# Patient Record
Sex: MALE | Race: WHITE | NOT HISPANIC OR LATINO | Employment: UNEMPLOYED | URBAN - METROPOLITAN AREA
[De-identification: names, ages, dates, MRNs, and addresses within clinical notes are randomized per-mention and may not be internally consistent; named-entity substitution may affect disease eponyms.]

---

## 2023-08-21 ENCOUNTER — DOCUMENTATION (OUTPATIENT)
Dept: PRIMARY CARE | Facility: CLINIC | Age: 53
End: 2023-08-21
Payer: COMMERCIAL

## 2023-09-05 PROBLEM — R13.19 ESOPHAGEAL DYSPHAGIA: Status: ACTIVE | Noted: 2023-09-05

## 2023-09-05 PROBLEM — M75.01 ADHESIVE CAPSULITIS OF RIGHT SHOULDER: Status: ACTIVE | Noted: 2023-09-05

## 2023-09-05 PROBLEM — M54.2 CERVICALGIA: Status: ACTIVE | Noted: 2023-09-05

## 2023-09-05 PROBLEM — F41.0 PANIC ATTACK: Status: ACTIVE | Noted: 2023-09-05

## 2023-09-05 PROBLEM — E78.00 PURE HYPERCHOLESTEROLEMIA: Status: ACTIVE | Noted: 2023-09-05

## 2023-09-05 PROBLEM — G57.93 NEUROPATHY INVOLVING BOTH LOWER EXTREMITIES: Status: ACTIVE | Noted: 2023-09-05

## 2023-09-05 PROBLEM — M47.816 LUMBAR SPONDYLOSIS: Status: ACTIVE | Noted: 2023-09-05

## 2023-09-05 PROBLEM — R91.1 LUNG NODULE: Status: ACTIVE | Noted: 2023-09-05

## 2023-09-05 RX ORDER — PAROXETINE 10 MG/1
10 TABLET, FILM COATED ORAL NIGHTLY
COMMUNITY
Start: 2022-11-03 | End: 2024-02-22 | Stop reason: SDUPTHER

## 2023-09-05 RX ORDER — MELOXICAM 15 MG/1
TABLET ORAL
COMMUNITY
End: 2023-10-27

## 2023-09-05 RX ORDER — OMEPRAZOLE 20 MG/1
TABLET, DELAYED RELEASE ORAL
COMMUNITY
End: 2023-11-06 | Stop reason: ALTCHOICE

## 2023-09-05 RX ORDER — LORAZEPAM 0.5 MG/1
0.5 TABLET ORAL 2 TIMES DAILY PRN
COMMUNITY
Start: 2023-05-14 | End: 2024-02-22 | Stop reason: SDUPTHER

## 2023-09-05 RX ORDER — LANOLIN ALCOHOL/MO/W.PET/CERES
1 CREAM (GRAM) TOPICAL EVERY 24 HOURS
COMMUNITY

## 2023-09-05 RX ORDER — ALBUTEROL SULFATE 90 UG/1
AEROSOL, METERED RESPIRATORY (INHALATION)
COMMUNITY
Start: 2022-05-17

## 2023-09-05 RX ORDER — GABAPENTIN 300 MG/1
600 CAPSULE ORAL 3 TIMES DAILY
COMMUNITY
Start: 2023-04-27 | End: 2023-11-22 | Stop reason: WASHOUT

## 2023-09-16 VITALS
WEIGHT: 200.4 LBS | HEART RATE: 76 BPM | SYSTOLIC BLOOD PRESSURE: 112 MMHG | BODY MASS INDEX: 23.19 KG/M2 | DIASTOLIC BLOOD PRESSURE: 68 MMHG | HEIGHT: 78 IN | OXYGEN SATURATION: 94 % | TEMPERATURE: 97.3 F

## 2023-09-20 PROBLEM — M48.061 DEGENERATIVE LUMBAR SPINAL STENOSIS: Status: ACTIVE | Noted: 2023-09-20

## 2023-09-20 RX ORDER — DICLOFENAC SODIUM 75 MG/1
75 TABLET, DELAYED RELEASE ORAL 2 TIMES DAILY
COMMUNITY
Start: 2023-08-21 | End: 2024-02-16

## 2023-09-20 RX ORDER — DULOXETIN HYDROCHLORIDE 30 MG/1
CAPSULE, DELAYED RELEASE ORAL
COMMUNITY
Start: 2023-09-06 | End: 2023-10-27 | Stop reason: SINTOL

## 2023-09-25 ENCOUNTER — HOSPITAL ENCOUNTER (OUTPATIENT)
Dept: DATA CONVERSION | Facility: HOSPITAL | Age: 53
Discharge: HOME | End: 2023-09-25
Payer: COMMERCIAL

## 2023-09-25 DIAGNOSIS — F17.210 NICOTINE DEPENDENCE, CIGARETTES, UNCOMPLICATED: ICD-10-CM

## 2023-09-25 DIAGNOSIS — M54.16 RADICULOPATHY, LUMBAR REGION: ICD-10-CM

## 2023-10-26 ENCOUNTER — OFFICE VISIT (OUTPATIENT)
Dept: PAIN MEDICINE | Facility: CLINIC | Age: 53
End: 2023-10-26
Payer: COMMERCIAL

## 2023-10-26 VITALS
HEART RATE: 69 BPM | DIASTOLIC BLOOD PRESSURE: 73 MMHG | HEIGHT: 78 IN | BODY MASS INDEX: 23.72 KG/M2 | WEIGHT: 205 LBS | RESPIRATION RATE: 18 BRPM | SYSTOLIC BLOOD PRESSURE: 110 MMHG

## 2023-10-26 DIAGNOSIS — M48.061 DEGENERATIVE LUMBAR SPINAL STENOSIS: Primary | ICD-10-CM

## 2023-10-26 DIAGNOSIS — M47.816 LUMBAR FACET ARTHROPATHY: ICD-10-CM

## 2023-10-26 DIAGNOSIS — M54.16 LUMBAR RADICULOPATHY: ICD-10-CM

## 2023-10-26 PROCEDURE — 99214 OFFICE O/P EST MOD 30 MIN: CPT | Performed by: STUDENT IN AN ORGANIZED HEALTH CARE EDUCATION/TRAINING PROGRAM

## 2023-10-26 ASSESSMENT — PATIENT HEALTH QUESTIONNAIRE - PHQ9
1. LITTLE INTEREST OR PLEASURE IN DOING THINGS: NOT AT ALL
SUM OF ALL RESPONSES TO PHQ9 QUESTIONS 1 AND 2: 0
2. FEELING DOWN, DEPRESSED OR HOPELESS: NOT AT ALL

## 2023-10-26 ASSESSMENT — PAIN - FUNCTIONAL ASSESSMENT: PAIN_FUNCTIONAL_ASSESSMENT: 0-10

## 2023-10-26 ASSESSMENT — PAIN SCALES - GENERAL
PAINLEVEL: 9
PAINLEVEL_OUTOF10: 9

## 2023-10-26 NOTE — PROGRESS NOTES
"[facility] Pain Management  Follow Up Office Visit Note 10/26/2023    Patient Information: Chris Rivas, MRN: [id number], : 1970   Primary Care/Referring Physician: Beka Gomez, , [address] / [facility] [address]     Chief Complaint: Low back and leg pain  Interval History: At his last office visit I started Duloxetine. He also had an L4/5 HAYLEY in the interim     Today he reports some pain relief for a few days but then his pain worsened. He feels that his pain is now worse than normal. He states the \"middle of his back and neck kept going numb\" after the injection. He is feeling a grinding in his low back, and still feels occasional numbness in his thoracic and cervical spine. Currently his worst area of pain is midline between his mid and lower back, with much less pain in his legs.     He tried Duloxetine but it caused diarrhea so he returned to taking Paroxetine    Brief History of Pain: Mr. Crhis Rivas is a 53 y.o. male with a PMHx of HLD, dysphagia, anxiety who presents for evaluation of low back, neck, and shoulder pain. He states his low back is the most signficant area of pain thus will focus on that today    He reports low back pain for 25 years that has been progressively worsening. He states he 'fractured' vertebrae in his low back many years ago but did not require surgery. He describes the pain as a midline stabbing, throbbing, and grinding. His low back pain worsens with walking, twisting. The pain occasionally radiates down his legs, right moreso than the left. His entire right foot is numb, and he occasionally gets numbness further up the leg. His left foot also occasionally gets numb. His Gabapentin dose was increased recently with some improvement in his foot pain but no improvement in his low back pain. He states Diclofenac is working better than Meloxicam but he is still having quite a lot of pain    Regarding his neck and left shoulder, he states he 'broke his neck' in " 2004 after diving into a pool but again did not require surgery. He has a lot of left shoulder pain, and occasionally right shoulder pain, when trying to lift his arms above his head.    Current Pain Medications: Diclofenac 75 mg BID PRN, Gabapentin 600 mg TID  Previously Tried Pain Medications: Meloxicam, PO steroids, Duloxetine - caused diarrhea    Relevant Surgeries: Denies neck or low back surgery  Injections: L4/5 HAYLEY - no pain relief    Physical/Occupational Therapy: No recent PT for his low back    Medications:   Current Outpatient Medications   Medication Instructions    albuterol 108 (90 Base) MCG/ACT inhaler 2 puffs as needed Inhalation every 4 -6 hours prn SOB    albuterol 90 mcg/actuation inhaler 2 puffs as needed Inhalation every 4-6 hrs    cyanocobalamin (Vitamin B-12) 1,000 mcg tablet Every 24 hours    diclofenac (Voltaren) 75 mg EC tablet 1 tablet as needed Orally Twice a day for 90 days    DULoxetine (Cymbalta) 30 mg DR capsule 1 capsule Orally Once a day for 30 day(s)    gabapentin (Neurontin) 300 mg capsule Every 8 hours    LORazepam (Ativan) 0.5 mg tablet take 1/2 to 1 tablet by mouth twice a day as needed for anxiety/panic for 10    meloxicam (Mobic) 15 mg tablet TAKE ONE TABLET BY MOUTH EVERY DAY for 30    omeprazole OTC (PriLOSEC OTC) 20 mg EC tablet oral    PARoxetine (Paxil) 10 mg tablet Every 24 hours      Allergies:   Allergies   Allergen Reactions    Lavender Anaphylaxis    Percocet [Oxycodone-Acetaminophen] Nausea/vomiting       Past Medical & Surgical History:  Past Medical History:   Diagnosis Date    Hyperlipidemia     Lumbar spondylosis     Panic attacks       Past Surgical History:   Procedure Laterality Date    APPENDECTOMY  08/08/2014    Appendectomy    JOINT REPLACEMENT Left     ELBOW    JOINT REPLACEMENT Right     ANKLE    LUNG SURGERY  10/01/2014    Lung Surgery       Family History   Problem Relation Name Age of Onset    Leukemia Mother      Dementia Father       Social  "History     Socioeconomic History    Marital status: Single     Spouse name: Not on file    Number of children: Not on file    Years of education: Not on file    Highest education level: Not on file   Occupational History    Not on file   Tobacco Use    Smoking status: Every Day     Packs/day: 0.50     Years: 3.00     Additional pack years: 0.00     Total pack years: 1.50     Types: Cigarettes     Start date: 1/1/2020    Smokeless tobacco: Not on file   Substance and Sexual Activity    Alcohol use: Yes     Comment: 2-4 TIMES A MONTH    Drug use: Never    Sexual activity: Not on file   Other Topics Concern    Not on file   Social History Narrative    Not on file     Social Determinants of Health     Financial Resource Strain: Not on file   Food Insecurity: Not on file   Transportation Needs: Not on file   Physical Activity: Not on file   Stress: Not on file   Social Connections: Not on file   Intimate Partner Violence: Not on file   Housing Stability: Not on file       Problems, Past medical history, past surgical history, Medications, allergies, social and family history reviewed and as per the electronic medical record from today's encounter    Review of Systems:  CONST: No fever, chills, fatigue, weight changes  EYES: No loss of vision  ENT: No hearing loss, tinnitus  CV: No chest pain, palpitations  RESP: No dyspnea, shortness of breath, cough  GI: No stool incontinence, nausea, vomiting  : No urinary incontinence  MSK: No joint swelling  SKIN: No rash, no hives  NEURO: No headache, dizziness, weakness, paresthesias  PSYCH: No anxiety, depression or suicidal ideation  HEM/LYMPH: No easy bruising or bleeding  All other systems reviewed are negative     Physical Exam:  Vitals: /73   Pulse 69   Resp 18   Ht 2.007 m (6' 7\")   Wt 93 kg (205 lb)   BMI 23.09 kg/m²   General: No apparent distress. Alert, appropriate, oriented x 3. Mood generally positive, affect congruent. Speaking in full sentences.   HENT: " "Normocephalic, atraumatic. Hearing intact.  Eyes: Pupils equal and round  Neck: Supple, trachea midline  Lungs: Symmetric respiratory excursion on visual exam, nonlabored breathing.   Extremities: No cyanosis or edema noted in extremities.  Skin: No rashes, lesions noted.  Neuro: Alert and appropriate. Gait within normal limits. Bulk and tone within normal limits.    Laboratory Data:  The following laboratory data were reviewed during this visit:   Lab Results   Component Value Date    WBC 4.4 (L) 09/22/2022    RBC 4.01 (L) 09/22/2022    HGB 12.8 (L) 09/22/2022    HCT 39.0 (L) 09/22/2022     09/22/2022      No results found for: \"INR\"  Lab Results   Component Value Date    CREATININE 0.8 09/22/2022       Imaging:  The following imaging impressions were reviewed by me during this visit:    -7/20/23 lumbar spine MRI shows L2-L3 left foraminal disc protrusion and left lateral disc protrusion with facet hypertrophy, moderate left-sided neural foraminal narrowing. L4-L5 diffuse disc bulging, facet hypertrophy, moderate to severe canal stenosis with moderate left-sided neural foraminal narrowing. L5-S1 diffuse disc bulging with superimposed left foraminal disc protrusion and facet hypertrophy, severe left-sided neural foraminal narrowing and mild-to-moderate right-sided neural foraminal narrowing  -8/23/23 left shoulder xray shows no acute fracture or glenohumeral dislocation. No acromioclavicular seperation. Mild acromioclavicular degenerative changes.  I also personally reviewed the images from the above studies myself. These images and my interpretation of them contributed to the management and decision making of the patient's medical plan.    ASSESSMENT:  Mr. Chris Rivas is a 53 y.o. male with low back and leg pain that is consistent with:    1. Degenerative lumbar spinal stenosis    2. Lumbar radiculopathy    3. Lumbar facet arthropathy        PLAN:  Radiology: I reviewed his 7/20/23 lumbar spine MRI which " shows L2-L3 left foraminal disc protrusion and left lateral disc protrusion with facet hypertrophy, moderate left-sided neural foraminal narrowing. L4-L5 diffuse disc bulging, facet hypertrophy, moderate to severe canal stenosis with moderate left-sided neural foraminal narrowing. L5-S1 diffuse disc bulging with superimposed left foraminal disc protrusion and facet hypertrophy, severe left-sided neural foraminal narrowing and mild-to-moderate right-sided neural foraminal narrowing    Physically: I believe he would benefit from referral for land and pool based therapy, which was provided today. I believe his current pain level may make it difficult to meaningfully participate in PT thus would like to attempt to control his pain with medication and intervention prior to starting this    Psychologically: I suspect there is some component of anxiety causing pain amplification. Continue following with your PCP and would consider referral to a behavioral health provider in the future    Medication: -Continue Gabapentin 600 mg TID, his dose was recently increased by his PCP with some improvement  -Discontinue Duloxetine, caused diarrhea    Duration: Multiple years    Intervention: I suspect his pain is multifactorial in the setting of lumbar spinal stenosis, lumbar facet arthropathy, lumbar radiculopathy, myofascial pain, and possible left shoulder rotator cuff tendinopathy. His MRI does show fairly advanced central and foraminal stenosis which likely accounts for some of the low back and leg pain he is experiencing. He underwent an HAYLEY at L4/5 with no improvement. Today we discussed trial of bilateral lumbar medial branch nerve blocks at L4/5, L5/S1 utilizing live fluoroscopy. Risk, benefits, alternatives discussed. He would like to proceed  -Given the severity of the degenerative changes noted, as well as evidence of numbness in his lower extremities, he may require decompressive surgery in the future. It seems he would  like to see Dr. Almazan if he decides to move forward with this        Sincerely,  Juan Stark MD  [facility] Pain Management - [place]

## 2023-10-26 NOTE — H&P (VIEW-ONLY)
"[facility] Pain Management  Follow Up Office Visit Note 10/26/2023    Patient Information: Chris Rivas, MRN: [id number], : 1970   Primary Care/Referring Physician: Beka Gomez, , [address] / [facility] [address]     Chief Complaint: Low back and leg pain  Interval History: At his last office visit I started Duloxetine. He also had an L4/5 HAYLEY in the interim     Today he reports some pain relief for a few days but then his pain worsened. He feels that his pain is now worse than normal. He states the \"middle of his back and neck kept going numb\" after the injection. He is feeling a grinding in his low back, and still feels occasional numbness in his thoracic and cervical spine. Currently his worst area of pain is midline between his mid and lower back, with much less pain in his legs.     He tried Duloxetine but it caused diarrhea so he returned to taking Paroxetine    Brief History of Pain: Mr. Chris Rivas is a 53 y.o. male with a PMHx of HLD, dysphagia, anxiety who presents for evaluation of low back, neck, and shoulder pain. He states his low back is the most signficant area of pain thus will focus on that today    He reports low back pain for 25 years that has been progressively worsening. He states he 'fractured' vertebrae in his low back many years ago but did not require surgery. He describes the pain as a midline stabbing, throbbing, and grinding. His low back pain worsens with walking, twisting. The pain occasionally radiates down his legs, right moreso than the left. His entire right foot is numb, and he occasionally gets numbness further up the leg. His left foot also occasionally gets numb. His Gabapentin dose was increased recently with some improvement in his foot pain but no improvement in his low back pain. He states Diclofenac is working better than Meloxicam but he is still having quite a lot of pain    Regarding his neck and left shoulder, he states he 'broke his neck' in " 2004 after diving into a pool but again did not require surgery. He has a lot of left shoulder pain, and occasionally right shoulder pain, when trying to lift his arms above his head.    Current Pain Medications: Diclofenac 75 mg BID PRN, Gabapentin 600 mg TID  Previously Tried Pain Medications: Meloxicam, PO steroids, Duloxetine - caused diarrhea    Relevant Surgeries: Denies neck or low back surgery  Injections: L4/5 HAYLEY - no pain relief    Physical/Occupational Therapy: No recent PT for his low back    Medications:   Current Outpatient Medications   Medication Instructions    albuterol 108 (90 Base) MCG/ACT inhaler 2 puffs as needed Inhalation every 4 -6 hours prn SOB    albuterol 90 mcg/actuation inhaler 2 puffs as needed Inhalation every 4-6 hrs    cyanocobalamin (Vitamin B-12) 1,000 mcg tablet Every 24 hours    diclofenac (Voltaren) 75 mg EC tablet 1 tablet as needed Orally Twice a day for 90 days    DULoxetine (Cymbalta) 30 mg DR capsule 1 capsule Orally Once a day for 30 day(s)    gabapentin (Neurontin) 300 mg capsule Every 8 hours    LORazepam (Ativan) 0.5 mg tablet take 1/2 to 1 tablet by mouth twice a day as needed for anxiety/panic for 10    meloxicam (Mobic) 15 mg tablet TAKE ONE TABLET BY MOUTH EVERY DAY for 30    omeprazole OTC (PriLOSEC OTC) 20 mg EC tablet oral    PARoxetine (Paxil) 10 mg tablet Every 24 hours      Allergies:   Allergies   Allergen Reactions    Lavender Anaphylaxis    Percocet [Oxycodone-Acetaminophen] Nausea/vomiting       Past Medical & Surgical History:  Past Medical History:   Diagnosis Date    Hyperlipidemia     Lumbar spondylosis     Panic attacks       Past Surgical History:   Procedure Laterality Date    APPENDECTOMY  08/08/2014    Appendectomy    JOINT REPLACEMENT Left     ELBOW    JOINT REPLACEMENT Right     ANKLE    LUNG SURGERY  10/01/2014    Lung Surgery       Family History   Problem Relation Name Age of Onset    Leukemia Mother      Dementia Father       Social  "History     Socioeconomic History    Marital status: Single     Spouse name: Not on file    Number of children: Not on file    Years of education: Not on file    Highest education level: Not on file   Occupational History    Not on file   Tobacco Use    Smoking status: Every Day     Packs/day: 0.50     Years: 3.00     Additional pack years: 0.00     Total pack years: 1.50     Types: Cigarettes     Start date: 1/1/2020    Smokeless tobacco: Not on file   Substance and Sexual Activity    Alcohol use: Yes     Comment: 2-4 TIMES A MONTH    Drug use: Never    Sexual activity: Not on file   Other Topics Concern    Not on file   Social History Narrative    Not on file     Social Determinants of Health     Financial Resource Strain: Not on file   Food Insecurity: Not on file   Transportation Needs: Not on file   Physical Activity: Not on file   Stress: Not on file   Social Connections: Not on file   Intimate Partner Violence: Not on file   Housing Stability: Not on file       Problems, Past medical history, past surgical history, Medications, allergies, social and family history reviewed and as per the electronic medical record from today's encounter    Review of Systems:  CONST: No fever, chills, fatigue, weight changes  EYES: No loss of vision  ENT: No hearing loss, tinnitus  CV: No chest pain, palpitations  RESP: No dyspnea, shortness of breath, cough  GI: No stool incontinence, nausea, vomiting  : No urinary incontinence  MSK: No joint swelling  SKIN: No rash, no hives  NEURO: No headache, dizziness, weakness, paresthesias  PSYCH: No anxiety, depression or suicidal ideation  HEM/LYMPH: No easy bruising or bleeding  All other systems reviewed are negative     Physical Exam:  Vitals: /73   Pulse 69   Resp 18   Ht 2.007 m (6' 7\")   Wt 93 kg (205 lb)   BMI 23.09 kg/m²   General: No apparent distress. Alert, appropriate, oriented x 3. Mood generally positive, affect congruent. Speaking in full sentences.   HENT: " "Normocephalic, atraumatic. Hearing intact.  Eyes: Pupils equal and round  Neck: Supple, trachea midline  Lungs: Symmetric respiratory excursion on visual exam, nonlabored breathing.   Extremities: No cyanosis or edema noted in extremities.  Skin: No rashes, lesions noted.  Neuro: Alert and appropriate. Gait within normal limits. Bulk and tone within normal limits.    Laboratory Data:  The following laboratory data were reviewed during this visit:   Lab Results   Component Value Date    WBC 4.4 (L) 09/22/2022    RBC 4.01 (L) 09/22/2022    HGB 12.8 (L) 09/22/2022    HCT 39.0 (L) 09/22/2022     09/22/2022      No results found for: \"INR\"  Lab Results   Component Value Date    CREATININE 0.8 09/22/2022       Imaging:  The following imaging impressions were reviewed by me during this visit:    -7/20/23 lumbar spine MRI shows L2-L3 left foraminal disc protrusion and left lateral disc protrusion with facet hypertrophy, moderate left-sided neural foraminal narrowing. L4-L5 diffuse disc bulging, facet hypertrophy, moderate to severe canal stenosis with moderate left-sided neural foraminal narrowing. L5-S1 diffuse disc bulging with superimposed left foraminal disc protrusion and facet hypertrophy, severe left-sided neural foraminal narrowing and mild-to-moderate right-sided neural foraminal narrowing  -8/23/23 left shoulder xray shows no acute fracture or glenohumeral dislocation. No acromioclavicular seperation. Mild acromioclavicular degenerative changes.  I also personally reviewed the images from the above studies myself. These images and my interpretation of them contributed to the management and decision making of the patient's medical plan.    ASSESSMENT:  Mr. Chris Rivas is a 53 y.o. male with low back and leg pain that is consistent with:    1. Degenerative lumbar spinal stenosis    2. Lumbar radiculopathy    3. Lumbar facet arthropathy        PLAN:  Radiology: I reviewed his 7/20/23 lumbar spine MRI which " shows L2-L3 left foraminal disc protrusion and left lateral disc protrusion with facet hypertrophy, moderate left-sided neural foraminal narrowing. L4-L5 diffuse disc bulging, facet hypertrophy, moderate to severe canal stenosis with moderate left-sided neural foraminal narrowing. L5-S1 diffuse disc bulging with superimposed left foraminal disc protrusion and facet hypertrophy, severe left-sided neural foraminal narrowing and mild-to-moderate right-sided neural foraminal narrowing    Physically: I believe he would benefit from referral for land and pool based therapy, which was provided today. I believe his current pain level may make it difficult to meaningfully participate in PT thus would like to attempt to control his pain with medication and intervention prior to starting this    Psychologically: I suspect there is some component of anxiety causing pain amplification. Continue following with your PCP and would consider referral to a behavioral health provider in the future    Medication: -Continue Gabapentin 600 mg TID, his dose was recently increased by his PCP with some improvement  -Discontinue Duloxetine, caused diarrhea    Duration: Multiple years    Intervention: I suspect his pain is multifactorial in the setting of lumbar spinal stenosis, lumbar facet arthropathy, lumbar radiculopathy, myofascial pain, and possible left shoulder rotator cuff tendinopathy. His MRI does show fairly advanced central and foraminal stenosis which likely accounts for some of the low back and leg pain he is experiencing. He underwent an HAYLEY at L4/5 with no improvement. Today we discussed trial of bilateral lumbar medial branch nerve blocks at L4/5, L5/S1 utilizing live fluoroscopy. Risk, benefits, alternatives discussed. He would like to proceed  -Given the severity of the degenerative changes noted, as well as evidence of numbness in his lower extremities, he may require decompressive surgery in the future. It seems he would  like to see Dr. Almazan if he decides to move forward with this        Sincerely,  Juan Stark MD  [facility] Pain Management - [place]

## 2023-11-06 ENCOUNTER — HOSPITAL ENCOUNTER (OUTPATIENT)
Dept: RADIOLOGY | Facility: HOSPITAL | Age: 53
Discharge: HOME | End: 2023-11-06
Payer: COMMERCIAL

## 2023-11-06 ENCOUNTER — HOSPITAL ENCOUNTER (OUTPATIENT)
Dept: GASTROENTEROLOGY | Facility: HOSPITAL | Age: 53
Discharge: HOME | End: 2023-11-06
Payer: COMMERCIAL

## 2023-11-06 VITALS
OXYGEN SATURATION: 100 % | DIASTOLIC BLOOD PRESSURE: 77 MMHG | TEMPERATURE: 97.3 F | HEIGHT: 78 IN | BODY MASS INDEX: 23.72 KG/M2 | RESPIRATION RATE: 18 BRPM | HEART RATE: 61 BPM | SYSTOLIC BLOOD PRESSURE: 136 MMHG | WEIGHT: 205 LBS

## 2023-11-06 DIAGNOSIS — M47.816 LUMBAR FACET ARTHROPATHY: ICD-10-CM

## 2023-11-06 PROCEDURE — 64494 INJ PARAVERT F JNT L/S 2 LEV: CPT | Performed by: STUDENT IN AN ORGANIZED HEALTH CARE EDUCATION/TRAINING PROGRAM

## 2023-11-06 PROCEDURE — 76000 FLUOROSCOPY <1 HR PHYS/QHP: CPT

## 2023-11-06 PROCEDURE — 64493 INJ PARAVERT F JNT L/S 1 LEV: CPT | Mod: 50 | Performed by: STUDENT IN AN ORGANIZED HEALTH CARE EDUCATION/TRAINING PROGRAM

## 2023-11-06 PROCEDURE — 64493 INJ PARAVERT F JNT L/S 1 LEV: CPT | Performed by: STUDENT IN AN ORGANIZED HEALTH CARE EDUCATION/TRAINING PROGRAM

## 2023-11-06 PROCEDURE — 64494 INJ PARAVERT F JNT L/S 2 LEV: CPT | Mod: 50 | Performed by: STUDENT IN AN ORGANIZED HEALTH CARE EDUCATION/TRAINING PROGRAM

## 2023-11-06 ASSESSMENT — COLUMBIA-SUICIDE SEVERITY RATING SCALE - C-SSRS
2. HAVE YOU ACTUALLY HAD ANY THOUGHTS OF KILLING YOURSELF?: NO
6. HAVE YOU EVER DONE ANYTHING, STARTED TO DO ANYTHING, OR PREPARED TO DO ANYTHING TO END YOUR LIFE?: NO
1. IN THE PAST MONTH, HAVE YOU WISHED YOU WERE DEAD OR WISHED YOU COULD GO TO SLEEP AND NOT WAKE UP?: NO

## 2023-11-06 ASSESSMENT — PAIN - FUNCTIONAL ASSESSMENT
PAIN_FUNCTIONAL_ASSESSMENT: 0-10
PAIN_FUNCTIONAL_ASSESSMENT: 0-10

## 2023-11-06 ASSESSMENT — PAIN SCALES - GENERAL
PAINLEVEL_OUTOF10: 9
PAINLEVEL_OUTOF10: 8

## 2023-11-06 NOTE — OP NOTE
"Procedure Note: 2023    PROCEDURE NAME: Diagnostic Bilateral Lumbar Medial Branch Nerve Block at L4/5, L5/S1    Patient Information: Chris Rivas, MRN: [id number], : 1970  Chief Complaint: Low back pain    Pain Diagnosis: The diagnosis of lumbar facet arthropathy has been made given the patient's clinical evaluation at prior evaluation.    Vitals:/77   Pulse 73   Temp 36.3 °C (97.3 °F) (Temporal)   Resp 18   Ht 2.007 m (6' 7\")   Wt 93 kg (205 lb)   SpO2 99%   BMI 23.09 kg/m²     DESCRIPTION OF PROCEDURE:    Chris Rivas was brought to the procedure room. The assistant obtained vital signs, which were found to be stable. He was then informed of the procedure, its benefits, and its risks, and his questions were answered regarding the procedure. Written informed consent was obtained from the patient. Immediately prior to starting the procedure, a time-out safety check was conducted and the patient's identification, procedure name, and procedure site were confirmed with the patient.    Bilateral Diagnostic Lumbar Medial Branch Blocks of L3, L4 and L5DR  After informed written consent was obtained, the patient was placed in prone position. Monitoring of pulse oximetry, heart rate, and blood pressure was done pre-procedure, and post-procedure. During the procedure the patient was monitored with continuous pulse-ox. A time out was performed before the beginning of the procedure. The correct site and laterality were marked. The skin was prepped with chlorhexidine, allowed to dry for 3 minutes, and draped in a sterile fashion.     Using an AP and oblique fluoroscopic view, the right transverse process of L4, L5, and the sacral ala were identified. The entry sites were marked and infiltrated with 1 mL of lidocaine 1% using a 25 g 1.5 inch needle. A 22 g 3.5 in. spinal needle was advanced along the superior margin of the L4 transverse process and lateral to the L4 superior articulating process. It " was directed inferiorly and medially so that the tip struck the junction of the base of the transverse process and advanced 2 mm along the course of the L3 medial branch nerve. A similar procedure was performed at L4 medial branch nerve at the junction of the L5 transverse process and superior articulating process. A similar procedure was performed at the L5 dorsal ramus at the junction of the S1 superior articular process and right sacral ala. This was then repeated at the same levels on the left in a similar fashion. A lateral fluoroscopic image was taken to confirm appropriate needle placement. At each location after negative aspiration, 0.5 ml of 2% lidocaine was injected. All needles were removed      Anesthesia: local anesthesia   Complications: none      Juan Stark MD

## 2023-11-06 NOTE — DISCHARGE INSTRUCTIONS
DISCHARGEINSTRUCTIONS FOR INJECTIONS     You underwent diagnostic bilateral lumbar medial branch nerve blocks today    Aftermost injections, it is recommended that you relax and limit your activity for the remainder of the day unless you have been told otherwise by your pain physician.  You should not drive a car, operate machinery, or make important legal decisions unless otherwise directed by your pain physician.  You may resume your normal activity, including exercise, tomorrow.      Keep a written pain diary of how much pain relief you experienced following the injection procedure and the length of time of pain relief you experienced pain relief. Following diagnostic injections like medial branch nerve blocks, sacroiliac joint blocks, stellate ganglion injections and other blocks, it is very important you record the specific amount of pain relief you experienced immediately after the injectionand how long it lasted. Your doctor will ask you for this information at your follow up visit.     For all injections, please keep the injection site dry and inspect the site for a couple of days. You may remove the Band-Aid the day of the injection at any time.     Some discomfort, bruising or slight swelling may occur at the injection site. This is not abnormal if it occurs.  If needed you may:    -Take over the counter medication such as Tylenol or Motrin.   -Apply an ice pack for 30 minutes, 2 to 3 times a day for the first 24 hours.     You may shower today; no soaking baths, hot tubs, whirlpools or swimming pools for two days.      If you are given steroids in your injection, it may take 3-5 days for the steroid medication to take effect. You may notice a worsening of your symptoms for 1-2 days after the injection. This is not abnormal.  You may use acetaminophen, ibuprofen, or prescription medication that your doctor may have prescribed for you if you need to do so.     A few common side effects of steroids include  facial flushing, sweating, restlessness, irritability,difficulty sleeping, increase in blood sugar, and increased blood pressure. If you have diabetes, please monitor your blood sugar at least once a day for at least 5 days. If you have poorly controlled high blood pressure, monitoryour blood pressure for at least 2 days and contact your primary care physician if these numbers are unusually high for you.      If you take aspirin or non-steroidal anti-inflammatory drugs (examples are Motrin, Advil, ibuprofen, Naprosyn, Voltaren, Relafen, etc.) you may restart these this evening, but stop taking it 3 days before your next appointment, unless instructed otherwiseby your physician.      You do not need to discontinue non-aspirin-containing pain medications prior to an injection (examples: Celebrex, tramadol, hydrocodone and acetaminophen).      If you take a blood thinning medication (Coumadin, Lovenox, Fragmin,Ticlid, Plavix, Pradaxa, etc.), please discuss this with your primary care physician/cardiologist and your pain physician. These medications MUST be discontinued before you can have an injection safely, without the risk of uncontrolled bleeding. If these medications are not discontinued for an appropriate period of time, you will not be able to receivean injection.      If you are taking Coumadin, please have your INR checked the morning of your procedure and bringthe result to your appointment unless otherwise instructed. If your INR is over 1.2, your injection may need to be rescheduled to avoid uncontrolled bleeding from the needle placement.     Call [facility] at [phone] between 8am-4pm Monday - Friday if you are experiencing the following:    If you received an epidural or spinal injection:    -Headache that doesnot go away with medicine, is worse when sitting or standing up, and is greatly relieved upon lying down.   -Severe pain worse than or different than your baseline pain.    -Chills or fever (101º F or greater).   -Drainage or signs of infection at the injection site     Go directly to the Emergency Department if you are experiencing the following and received an epidural or spinal injection:   -Abrupt weakness or progressive weakness in your legs that starts after you leave the clinic.   -Abrupt severe or worsening numbness in your legs.   -Inability to urinate after the injection or loss of bowel or bladder control without the urge to defecate or urinate.     If you have a clinical question that cannot wait until your next appointment, please call [phone] between 8am-4pm Monday - Friday or send a [employer] message. We do our best to return all non-emergency messages within 24 hours, Monday - Friday. A nurse or physician will return your message.      If you need to cancel an appointment, please call the scheduling staff at [phone] during normal business hours or leave a message at least 24 hours in advance.     If you are going to be sedated for your next procedure, you MUST have responsible adult who can legally drive accompany you home. You cannot eat or drink for eight hours prior to the planned procedure if you are going to receive sedation. You may take your non-blood thinning medications with a small sip of water.

## 2023-11-06 NOTE — POST-PROCEDURE NOTE
0957: Pt returned alert and oriented. Pt is not in any distress. Pt states pain is a 8/10. Band aids x4 to back is c/d/I.     1004: Pt is able to stand and ambulate without difficulty. Pt educated on discharge instructions.

## 2023-11-08 ENCOUNTER — TELEMEDICINE (OUTPATIENT)
Dept: PAIN MEDICINE | Facility: CLINIC | Age: 53
End: 2023-11-08
Payer: COMMERCIAL

## 2023-11-08 VITALS — BODY MASS INDEX: 23.72 KG/M2 | HEIGHT: 78 IN | WEIGHT: 205 LBS

## 2023-11-08 DIAGNOSIS — M48.061 DEGENERATIVE LUMBAR SPINAL STENOSIS: ICD-10-CM

## 2023-11-08 DIAGNOSIS — M47.816 LUMBAR FACET ARTHROPATHY: Primary | ICD-10-CM

## 2023-11-08 DIAGNOSIS — M79.18 MYOFASCIAL PAIN: ICD-10-CM

## 2023-11-08 PROCEDURE — 99213 OFFICE O/P EST LOW 20 MIN: CPT | Mod: 95 | Performed by: STUDENT IN AN ORGANIZED HEALTH CARE EDUCATION/TRAINING PROGRAM

## 2023-11-08 PROCEDURE — 99213 OFFICE O/P EST LOW 20 MIN: CPT | Performed by: STUDENT IN AN ORGANIZED HEALTH CARE EDUCATION/TRAINING PROGRAM

## 2023-11-08 RX ORDER — ALPRAZOLAM 0.5 MG/1
0.5 TABLET ORAL ONCE
Status: CANCELLED | OUTPATIENT
Start: 2023-11-08

## 2023-11-08 ASSESSMENT — PAIN DESCRIPTION - DESCRIPTORS: DESCRIPTORS: ACHING;STABBING

## 2023-11-08 ASSESSMENT — PAIN - FUNCTIONAL ASSESSMENT: PAIN_FUNCTIONAL_ASSESSMENT: 0-10

## 2023-11-08 ASSESSMENT — PATIENT HEALTH QUESTIONNAIRE - PHQ9
1. LITTLE INTEREST OR PLEASURE IN DOING THINGS: NOT AT ALL
2. FEELING DOWN, DEPRESSED OR HOPELESS: NOT AT ALL
SUM OF ALL RESPONSES TO PHQ9 QUESTIONS 1 AND 2: 0

## 2023-11-08 ASSESSMENT — PAIN SCALES - GENERAL: PAINLEVEL: 7

## 2023-11-08 NOTE — PROGRESS NOTES
[facility]  Follow Up Office Visit Note 2023    Patient Information: Chris Rivas, MRN: [id number], : 1970   Primary Care/Referring Physician: Beka Gomez DO, [address] [facility] [address] / [facility] [place] [address]     Chief Complaint: Low back and leg pain  Interval History: He returns today for follow up after bilateral L4/5, L5/S1 medial branch nerve blocks    Today he reports 80% improvement in pain after this procedure and would like to proceed with a 2nd diagnostic block. His pain has returned to baseline since the injection.     Brief History of Pain: Mr. Chris Rivas is a 53 y.o. male with a PMHx of HLD, dysphagia, anxiety who presents for evaluation of low back, neck, and shoulder pain. He states his low back is the most signficant area of pain thus will focus on that today    He reports low back pain for 25 years that has been progressively worsening. He states he 'fractured' vertebrae in his low back many years ago but did not require surgery. He describes the pain as a midline stabbing, throbbing, and grinding. His low back pain worsens with walking, twisting. The pain occasionally radiates down his legs, right moreso than the left. His entire right foot is numb, and he occasionally gets numbness further up the leg. His left foot also occasionally gets numb. His Gabapentin dose was increased recently with some improvement in his foot pain but no improvement in his low back pain. He states Diclofenac is working better than Meloxicam but he is still having quite a lot of pain    Regarding his neck and left shoulder, he states he 'broke his neck' in  after diving into a pool but again did not require surgery. He has a lot of left shoulder pain, and occasionally right shoulder pain, when trying to lift his arms above his head.    Current Pain Medications: Diclofenac 75 mg BID PRN, Gabapentin 600 mg TID  Previously Tried Pain Medications: Meloxicam, PO steroids, Duloxetine - caused  diarrhea    Relevant Surgeries: Denies neck or low back surgery  Injections: L4/5 HAYLEY - no pain relief    Physical/Occupational Therapy: No recent PT for his low back    Medications:   Current Outpatient Medications   Medication Instructions    albuterol 90 mcg/actuation inhaler 2 puffs as needed Inhalation every 4-6 hrs    cyanocobalamin (Vitamin B-12) 1,000 mcg tablet 1 tablet, oral, Every 24 hours    diclofenac (Voltaren) 75 mg EC tablet Take 1 tablet (75 mg) by mouth 2 times a day.    gabapentin (NEURONTIN) 600 mg, oral, 3 times daily    LORazepam (Ativan) 0.5 mg tablet Take 1 tablet (0.5 mg) by mouth 2 times a day as needed for anxiety.    PARoxetine (PAXIL) 10 mg, oral, Nightly      Allergies:   Allergies   Allergen Reactions    Lavender Anaphylaxis    Percocet [Oxycodone-Acetaminophen] Nausea/vomiting       Past Medical & Surgical History:  Past Medical History:   Diagnosis Date    Hyperlipidemia     Lumbar spondylosis     Panic attacks       Past Surgical History:   Procedure Laterality Date    APPENDECTOMY  08/08/2014    Appendectomy    JOINT REPLACEMENT Left     ELBOW    JOINT REPLACEMENT Right     ANKLE    LUNG SURGERY  10/01/2014    Lung Surgery       Family History   Problem Relation Name Age of Onset    Leukemia Mother      Dementia Father       Social History     Socioeconomic History    Marital status: Single     Spouse name: Not on file    Number of children: Not on file    Years of education: Not on file    Highest education level: Not on file   Occupational History    Not on file   Tobacco Use    Smoking status: Every Day     Packs/day: 0.50     Years: 3.00     Additional pack years: 0.00     Total pack years: 1.50     Types: Cigarettes     Start date: 1/1/2020    Smokeless tobacco: Not on file   Substance and Sexual Activity    Alcohol use: Yes     Comment: 2-4 TIMES A MONTH    Drug use: Never    Sexual activity: Not on file   Other Topics Concern    Not on file   Social History Narrative    Not  "on file     Social Determinants of Health     Financial Resource Strain: Not on file   Food Insecurity: Not on file   Transportation Needs: Not on file   Physical Activity: Not on file   Stress: Not on file   Social Connections: Not on file   Intimate Partner Violence: Not on file   Housing Stability: Not on file       Problems, Past medical history, past surgical history, Medications, allergies, social and family history reviewed and as per the electronic medical record from today's encounter    Review of Systems:  CONST: No fever, chills, fatigue, weight changes  EYES: No loss of vision  ENT: No hearing loss, tinnitus  CV: No chest pain, palpitations  RESP: No dyspnea, shortness of breath, cough  GI: No stool incontinence, nausea, vomiting  : No urinary incontinence  MSK: No joint swelling  SKIN: No rash, no hives  NEURO: No headache, dizziness, weakness  PSYCH: Reports anxiety, depression  HEM/LYMPH: No easy bruising or bleeding  All other systems reviewed are negative     Physical Exam:  Vitals: Wt 93 kg (205 lb)   BMI 23.09 kg/m²   Limited by telemedicine  General: No apparent distress. Alert, appropriate, oriented x 3. Mood generally positive, affect congruent. Speaking in full sentences.   HENT: Normocephalic, atraumatic. Hearing intact.  Lungs: Grossly nonlabored breathing.    Laboratory Data:  The following laboratory data were reviewed during this visit:   Lab Results   Component Value Date    WBC 4.4 (L) 09/22/2022    RBC 4.01 (L) 09/22/2022    HGB 12.8 (L) 09/22/2022    HCT 39.0 (L) 09/22/2022     09/22/2022      No results found for: \"INR\"  Lab Results   Component Value Date    CREATININE 0.8 09/22/2022       Imaging:  The following imaging impressions were reviewed by me during this visit:    -7/20/23 lumbar spine MRI shows L2-L3 left foraminal disc protrusion and left lateral disc protrusion with facet hypertrophy, moderate left-sided neural foraminal narrowing. L4-L5 diffuse disc bulging, " facet hypertrophy, moderate to severe canal stenosis with moderate left-sided neural foraminal narrowing. L5-S1 diffuse disc bulging with superimposed left foraminal disc protrusion and facet hypertrophy, severe left-sided neural foraminal narrowing and mild-to-moderate right-sided neural foraminal narrowing  -8/23/23 left shoulder xray shows no acute fracture or glenohumeral dislocation. No acromioclavicular seperation. Mild acromioclavicular degenerative changes.  I also personally reviewed the images from the above studies myself. These images and my interpretation of them contributed to the management and decision making of the patient's medical plan.    ASSESSMENT:  Mr. Chris Rivas is a 53 y.o. male with low back and leg pain that is consistent with:    1. Lumbar facet arthropathy    2. Myofascial pain    3. Degenerative lumbar spinal stenosis          PLAN:  Radiology: I reviewed his 7/20/23 lumbar spine MRI which shows L2-L3 left foraminal disc protrusion and left lateral disc protrusion with facet hypertrophy, moderate left-sided neural foraminal narrowing. L4-L5 diffuse disc bulging, facet hypertrophy, moderate to severe canal stenosis with moderate left-sided neural foraminal narrowing. L5-S1 diffuse disc bulging with superimposed left foraminal disc protrusion and facet hypertrophy, severe left-sided neural foraminal narrowing and mild-to-moderate right-sided neural foraminal narrowing    Physically: I believe he would benefit from referral for land and pool based therapy, which was provided today. I believe his current pain level may make it difficult to meaningfully participate in PT thus would like to attempt to control his pain with medication and intervention prior to starting this    Psychologically: I suspect there is some component of anxiety causing pain amplification. Continue following with your PCP and would consider referral to a behavioral health provider in the future    Medication:  -Continue Gabapentin 600 mg TID, his dose was recently increased by his PCP with some improvement    Duration: Multiple years    Intervention: I suspect his pain is multifactorial in the setting of lumbar spinal stenosis, lumbar facet arthropathy, lumbar radiculopathy, myofascial pain, and possible left shoulder rotator cuff tendinopathy. His MRI does show fairly advanced central and foraminal stenosis which likely accounts for some of the low back and leg pain he is experiencing. He underwent an HAYLEY at L4/5 with no improvement. He underwent bilateral lumbar medial branch nerve blocks at L4/5, L5/S1 with 80% improvement in his pain. He would like to proceed with a 2nd diagnostic procedure utilizing live fluoroscopy and PO Xanax    -Given the severity of the degenerative changes noted, as well as evidence of numbness in his lower extremities, he may require decompressive surgery in the future. It seems he would like to see Dr. Almazan if he decides to move forward with this        Sincerely,  Juan Stark MD  [facility] - [place]

## 2023-11-08 NOTE — H&P (VIEW-ONLY)
[facility]  Follow Up Office Visit Note 2023    Patient Information: Chris Rivas, MRN: [id number], : 1970   Primary Care/Referring Physician: Beka Gomez DO, [address] [facility] [address] [facility] [id number]     Chief Complaint: Low back and leg pain  Interval History: He returns today for follow up after bilateral L4/5, L5/S1 medial branch nerve blocks    Today he reports 80% improvement in pain after this procedure and would like to proceed with a 2nd diagnostic block. His pain has returned to baseline since the injection.     Brief History of Pain: Mr. Chris Rivas is a 53 y.o. male with a PMHx of HLD, dysphagia, anxiety who presents for evaluation of low back, neck, and shoulder pain. He states his low back is the most signficant area of pain thus will focus on that today    He reports low back pain for 25 years that has been progressively worsening. He states he 'fractured' vertebrae in his low back many years ago but did not require surgery. He describes the pain as a midline stabbing, throbbing, and grinding. His low back pain worsens with walking, twisting. The pain occasionally radiates down his legs, right moreso than the left. His entire right foot is numb, and he occasionally gets numbness further up the leg. His left foot also occasionally gets numb. His Gabapentin dose was increased recently with some improvement in his foot pain but no improvement in his low back pain. He states Diclofenac is working better than Meloxicam but he is still having quite a lot of pain    Regarding his neck and left shoulder, he states he 'broke his neck' in  after diving into a pool but again did not require surgery. He has a lot of left shoulder pain, and occasionally right shoulder pain, when trying to lift his arms above his head.    Current Pain Medications: Diclofenac 75 mg BID PRN, Gabapentin 600 mg TID  Previously Tried Pain Medications: Meloxicam, PO steroids, Duloxetine - caused  diarrhea    Relevant Surgeries: Denies neck or low back surgery  Injections: L4/5 HAYLEY - no pain relief    Physical/Occupational Therapy: No recent PT for his low back    Medications:   Current Outpatient Medications   Medication Instructions    albuterol 90 mcg/actuation inhaler 2 puffs as needed Inhalation every 4-6 hrs    cyanocobalamin (Vitamin B-12) 1,000 mcg tablet 1 tablet, oral, Every 24 hours    diclofenac (Voltaren) 75 mg EC tablet Take 1 tablet (75 mg) by mouth 2 times a day.    gabapentin (NEURONTIN) 600 mg, oral, 3 times daily    LORazepam (Ativan) 0.5 mg tablet Take 1 tablet (0.5 mg) by mouth 2 times a day as needed for anxiety.    PARoxetine (PAXIL) 10 mg, oral, Nightly      Allergies:   Allergies   Allergen Reactions    Lavender Anaphylaxis    Percocet [Oxycodone-Acetaminophen] Nausea/vomiting       Past Medical & Surgical History:  Past Medical History:   Diagnosis Date    Hyperlipidemia     Lumbar spondylosis     Panic attacks       Past Surgical History:   Procedure Laterality Date    APPENDECTOMY  08/08/2014    Appendectomy    JOINT REPLACEMENT Left     ELBOW    JOINT REPLACEMENT Right     ANKLE    LUNG SURGERY  10/01/2014    Lung Surgery       Family History   Problem Relation Name Age of Onset    Leukemia Mother      Dementia Father       Social History     Socioeconomic History    Marital status: Single     Spouse name: Not on file    Number of children: Not on file    Years of education: Not on file    Highest education level: Not on file   Occupational History    Not on file   Tobacco Use    Smoking status: Every Day     Packs/day: 0.50     Years: 3.00     Additional pack years: 0.00     Total pack years: 1.50     Types: Cigarettes     Start date: 1/1/2020    Smokeless tobacco: Not on file   Substance and Sexual Activity    Alcohol use: Yes     Comment: 2-4 TIMES A MONTH    Drug use: Never    Sexual activity: Not on file   Other Topics Concern    Not on file   Social History Narrative    Not  "on file     Social Determinants of Health     Financial Resource Strain: Not on file   Food Insecurity: Not on file   Transportation Needs: Not on file   Physical Activity: Not on file   Stress: Not on file   Social Connections: Not on file   Intimate Partner Violence: Not on file   Housing Stability: Not on file       Problems, Past medical history, past surgical history, Medications, allergies, social and family history reviewed and as per the electronic medical record from today's encounter    Review of Systems:  CONST: No fever, chills, fatigue, weight changes  EYES: No loss of vision  ENT: No hearing loss, tinnitus  CV: No chest pain, palpitations  RESP: No dyspnea, shortness of breath, cough  GI: No stool incontinence, nausea, vomiting  : No urinary incontinence  MSK: No joint swelling  SKIN: No rash, no hives  NEURO: No headache, dizziness, weakness  PSYCH: Reports anxiety, depression  HEM/LYMPH: No easy bruising or bleeding  All other systems reviewed are negative     Physical Exam:  Vitals: Wt 93 kg (205 lb)   BMI 23.09 kg/m²   Limited by telemedicine  General: No apparent distress. Alert, appropriate, oriented x 3. Mood generally positive, affect congruent. Speaking in full sentences.   HENT: Normocephalic, atraumatic. Hearing intact.  Lungs: Grossly nonlabored breathing.    Laboratory Data:  The following laboratory data were reviewed during this visit:   Lab Results   Component Value Date    WBC 4.4 (L) 09/22/2022    RBC 4.01 (L) 09/22/2022    HGB 12.8 (L) 09/22/2022    HCT 39.0 (L) 09/22/2022     09/22/2022      No results found for: \"INR\"  Lab Results   Component Value Date    CREATININE 0.8 09/22/2022       Imaging:  The following imaging impressions were reviewed by me during this visit:    -7/20/23 lumbar spine MRI shows L2-L3 left foraminal disc protrusion and left lateral disc protrusion with facet hypertrophy, moderate left-sided neural foraminal narrowing. L4-L5 diffuse disc bulging, " facet hypertrophy, moderate to severe canal stenosis with moderate left-sided neural foraminal narrowing. L5-S1 diffuse disc bulging with superimposed left foraminal disc protrusion and facet hypertrophy, severe left-sided neural foraminal narrowing and mild-to-moderate right-sided neural foraminal narrowing  -8/23/23 left shoulder xray shows no acute fracture or glenohumeral dislocation. No acromioclavicular seperation. Mild acromioclavicular degenerative changes.  I also personally reviewed the images from the above studies myself. These images and my interpretation of them contributed to the management and decision making of the patient's medical plan.    ASSESSMENT:  Mr. Chris Rivas is a 53 y.o. male with low back and leg pain that is consistent with:    1. Lumbar facet arthropathy    2. Myofascial pain    3. Degenerative lumbar spinal stenosis          PLAN:  Radiology: I reviewed his 7/20/23 lumbar spine MRI which shows L2-L3 left foraminal disc protrusion and left lateral disc protrusion with facet hypertrophy, moderate left-sided neural foraminal narrowing. L4-L5 diffuse disc bulging, facet hypertrophy, moderate to severe canal stenosis with moderate left-sided neural foraminal narrowing. L5-S1 diffuse disc bulging with superimposed left foraminal disc protrusion and facet hypertrophy, severe left-sided neural foraminal narrowing and mild-to-moderate right-sided neural foraminal narrowing    Physically: I believe he would benefit from referral for land and pool based therapy, which was provided today. I believe his current pain level may make it difficult to meaningfully participate in PT thus would like to attempt to control his pain with medication and intervention prior to starting this    Psychologically: I suspect there is some component of anxiety causing pain amplification. Continue following with your PCP and would consider referral to a behavioral health provider in the future    Medication:  -Continue Gabapentin 600 mg TID, his dose was recently increased by his PCP with some improvement    Duration: Multiple years    Intervention: I suspect his pain is multifactorial in the setting of lumbar spinal stenosis, lumbar facet arthropathy, lumbar radiculopathy, myofascial pain, and possible left shoulder rotator cuff tendinopathy. His MRI does show fairly advanced central and foraminal stenosis which likely accounts for some of the low back and leg pain he is experiencing. He underwent an HAYLEY at L4/5 with no improvement. He underwent bilateral lumbar medial branch nerve blocks at L4/5, L5/S1 with 80% improvement in his pain. He would like to proceed with a 2nd diagnostic procedure utilizing live fluoroscopy and PO Xanax    -Given the severity of the degenerative changes noted, as well as evidence of numbness in his lower extremities, he may require decompressive surgery in the future. It seems he would like to see Dr. Almazan if he decides to move forward with this        Sincerely,  Juan Stark MD  [facility] - [place]

## 2023-11-14 ENCOUNTER — TELEPHONE (OUTPATIENT)
Dept: PAIN MEDICINE | Facility: CLINIC | Age: 53
End: 2023-11-14
Payer: COMMERCIAL

## 2023-11-18 DIAGNOSIS — M48.061 DEGENERATIVE LUMBAR SPINAL STENOSIS: Primary | ICD-10-CM

## 2023-11-19 RX ORDER — GABAPENTIN 600 MG/1
600 TABLET ORAL 3 TIMES DAILY
Qty: 90 TABLET | Refills: 1 | Status: SHIPPED | OUTPATIENT
Start: 2023-11-19 | End: 2024-02-12 | Stop reason: SDUPTHER

## 2023-11-20 ENCOUNTER — HOSPITAL ENCOUNTER (OUTPATIENT)
Dept: GASTROENTEROLOGY | Facility: HOSPITAL | Age: 53
Discharge: HOME | End: 2023-11-20
Payer: COMMERCIAL

## 2023-11-20 ENCOUNTER — HOSPITAL ENCOUNTER (OUTPATIENT)
Dept: RADIOLOGY | Facility: HOSPITAL | Age: 53
Discharge: HOME | End: 2023-11-20
Payer: COMMERCIAL

## 2023-11-20 VITALS
BODY MASS INDEX: 23.72 KG/M2 | WEIGHT: 205 LBS | DIASTOLIC BLOOD PRESSURE: 77 MMHG | HEART RATE: 62 BPM | SYSTOLIC BLOOD PRESSURE: 132 MMHG | HEIGHT: 78 IN | RESPIRATION RATE: 18 BRPM | OXYGEN SATURATION: 98 % | TEMPERATURE: 97.7 F

## 2023-11-20 DIAGNOSIS — M47.816 LUMBAR FACET ARTHROPATHY: ICD-10-CM

## 2023-11-20 PROCEDURE — 64494 INJ PARAVERT F JNT L/S 2 LEV: CPT | Mod: 50 | Performed by: STUDENT IN AN ORGANIZED HEALTH CARE EDUCATION/TRAINING PROGRAM

## 2023-11-20 PROCEDURE — 64493 INJ PARAVERT F JNT L/S 1 LEV: CPT | Performed by: STUDENT IN AN ORGANIZED HEALTH CARE EDUCATION/TRAINING PROGRAM

## 2023-11-20 PROCEDURE — 64493 INJ PARAVERT F JNT L/S 1 LEV: CPT | Mod: 50 | Performed by: STUDENT IN AN ORGANIZED HEALTH CARE EDUCATION/TRAINING PROGRAM

## 2023-11-20 PROCEDURE — 64494 INJ PARAVERT F JNT L/S 2 LEV: CPT | Performed by: STUDENT IN AN ORGANIZED HEALTH CARE EDUCATION/TRAINING PROGRAM

## 2023-11-20 PROCEDURE — 2500000001 HC RX 250 WO HCPCS SELF ADMINISTERED DRUGS (ALT 637 FOR MEDICARE OP): Performed by: STUDENT IN AN ORGANIZED HEALTH CARE EDUCATION/TRAINING PROGRAM

## 2023-11-20 PROCEDURE — 76000 FLUOROSCOPY <1 HR PHYS/QHP: CPT

## 2023-11-20 RX ORDER — ALPRAZOLAM 0.5 MG/1
0.5 TABLET ORAL ONCE
Status: COMPLETED | OUTPATIENT
Start: 2023-11-20 | End: 2023-11-20

## 2023-11-20 RX ADMIN — ALPRAZOLAM 0.5 MG: 0.5 TABLET ORAL at 08:11

## 2023-11-20 ASSESSMENT — PAIN SCALES - GENERAL: PAINLEVEL_OUTOF10: 6

## 2023-11-20 ASSESSMENT — PAIN - FUNCTIONAL ASSESSMENT
PAIN_FUNCTIONAL_ASSESSMENT: 0-10
PAIN_FUNCTIONAL_ASSESSMENT: 0-10

## 2023-11-20 ASSESSMENT — PAIN DESCRIPTION - DESCRIPTORS: DESCRIPTORS: ACHING;DULL

## 2023-11-20 ASSESSMENT — COLUMBIA-SUICIDE SEVERITY RATING SCALE - C-SSRS
2. HAVE YOU ACTUALLY HAD ANY THOUGHTS OF KILLING YOURSELF?: NO
1. IN THE PAST MONTH, HAVE YOU WISHED YOU WERE DEAD OR WISHED YOU COULD GO TO SLEEP AND NOT WAKE UP?: NO
6. HAVE YOU EVER DONE ANYTHING, STARTED TO DO ANYTHING, OR PREPARED TO DO ANYTHING TO END YOUR LIFE?: NO

## 2023-11-20 NOTE — DISCHARGE INSTRUCTIONS
DISCHARGE INSTRUCTIONS FOR INJECTIONS     You underwent diagnostic lumbar medial branch nerve blocks today    Aftermost injections, it is recommended that you relax and limit your activity for the remainder of the day unless you have been told otherwise by your pain physician.  You should not drive a car, operate machinery, or make important legal decisions unless otherwise directed by your pain physician.  You may resume your normal activity, including exercise, tomorrow.      Keep a written pain diary of how much pain relief you experienced following the injection procedure and the length of time of pain relief you experienced pain relief. Following diagnostic injections like medial branch nerve blocks, sacroiliac joint blocks, stellate ganglion injections and other blocks, it is very important you record the specific amount of pain relief you experienced immediately after the injectionand how long it lasted. Your doctor will ask you for this information at your follow up visit.     For all injections, please keep the injection site dry and inspect the site for a couple of days. You may remove the Band-Aid the day of the injection at any time.     Some discomfort, bruising or slight swelling may occur at the injection site. This is not abnormal if it occurs.  If needed you may:    -Take over the counter medication such as Tylenol or Motrin.   -Apply an ice pack for 30 minutes, 2 to 3 times a day for the first 24 hours.     You may shower today; no soaking baths, hot tubs, whirlpools or swimming pools for two days.      If you are given steroids in your injection, it may take 3-5 days for the steroid medication to take effect. You may notice a worsening of your symptoms for 1-2 days after the injection. This is not abnormal.  You may use acetaminophen, ibuprofen, or prescription medication that your doctor may have prescribed for you if you need to do so.     A few common side effects of steroids include facial  flushing, sweating, restlessness, irritability,difficulty sleeping, increase in blood sugar, and increased blood pressure. If you have diabetes, please monitor your blood sugar at least once a day for at least 5 days. If you have poorly controlled high blood pressure, monitoryour blood pressure for at least 2 days and contact your primary care physician if these numbers are unusually high for you.      If you take aspirin or non-steroidal anti-inflammatory drugs (examples are Motrin, Advil, ibuprofen, Naprosyn, Voltaren, Relafen, etc.) you may restart these this evening, but stop taking it 3 days before your next appointment, unless instructed otherwiseby your physician.      You do not need to discontinue non-aspirin-containing pain medications prior to an injection (examples: Celebrex, tramadol, hydrocodone and acetaminophen).      If you take a blood thinning medication (Coumadin, Lovenox, Fragmin,Ticlid, Plavix, Pradaxa, etc.), please discuss this with your primary care physician/cardiologist and your pain physician. These medications MUST be discontinued before you can have an injection safely, without the risk of uncontrolled bleeding. If these medications are not discontinued for an appropriate period of time, you will not be able to receivean injection.      If you are taking Coumadin, please have your INR checked the morning of your procedure and bringthe result to your appointment unless otherwise instructed. If your INR is over 1.2, your injection may need to be rescheduled to avoid uncontrolled bleeding from the needle placement.     Call [facility] at [phone] between 8am-4pm Monday - Friday if you are experiencing the following:    If you received an epidural or spinal injection:    -Headache that doesnot go away with medicine, is worse when sitting or standing up, and is greatly relieved upon lying down.   -Severe pain worse than or different than your baseline pain.   -Chills  or fever (101º F or greater).   -Drainage or signs of infection at the injection site     Go directly to the Emergency Department if you are experiencing the following and received an epidural or spinal injection:   -Abrupt weakness or progressive weakness in your legs that starts after you leave the clinic.   -Abrupt severe or worsening numbness in your legs.   -Inability to urinate after the injection or loss of bowel or bladder control without the urge to defecate or urinate.     If you have a clinical question that cannot wait until your next appointment, please call [phone] between 8am-4pm Monday - Friday or send a [employer] message. We do our best to return all non-emergency messages within 24 hours, Monday - Friday. A nurse or physician will return your message.      If you need to cancel an appointment, please call the scheduling staff at [phone] during normal business hours or leave a message at least 24 hours in advance.     If you are going to be sedated for your next procedure, you MUST have responsible adult who can legally drive accompany you home. You cannot eat or drink for eight hours prior to the planned procedure if you are going to receive sedation. You may take your non-blood thinning medications with a small sip of water.

## 2023-11-20 NOTE — OP NOTE
"Procedure Note: 2023    PROCEDURE NAME: Diagnostic Bilateral Lumbar Medial Branch Nerve Blocks, L4/5, L5/S1    Patient Information: Chris Rivas, MRN: [id number], : 1970  Chief Complaint: Low back pain    Pain Diagnosis: The diagnosis of Lumbar facet arthropathy has been made given the patient's clinical evaluation at prior evaluation.    Vitals:/84   Pulse 75   Temp 36.5 °C (97.7 °F) (Temporal)   Resp 17   Ht 2.007 m (6' 7\")   Wt 93 kg (205 lb)   SpO2 99%   BMI 23.09 kg/m²     DESCRIPTION OF PROCEDURE:    Chris Rivas was brought to the procedure room. The assistant obtained vital signs, which were found to be stable. He was then informed of the procedure, its benefits, and its risks, and his questions were answered regarding the procedure. Written informed consent was obtained from the patient. Immediately prior to starting the procedure, a time-out safety check was conducted and the patient's identification, procedure name, and procedure site were confirmed with the patient.    Bilateral Diagnostic Lumbar Medial Branch Blocks of L3, L4 and L5DR  After informed written consent was obtained, the patient was placed in prone position. Monitoring of pulse oximetry, heart rate, and blood pressure was done pre-procedure, and post-procedure. During the procedure the patient was monitored with continuous pulse-ox. A time out was performed before the beginning of the procedure. The correct site and laterality were marked. The skin was prepped with chlorhexidine, allowed to dry for 3 minutes, and draped in a sterile fashion.     Using an AP and oblique fluoroscopic view, the right transverse process of L4, L5, and the sacral ala were identified. The entry sites were marked and infiltrated with 1 mL of lidocaine 1% using a 25 g 1.5 inch needle. A 22 g 3.5 in. spinal needle was advanced along the superior margin of the L4 transverse process and lateral to the L4 superior articulating process. It " was directed inferiorly and medially so that the tip struck the junction of the base of the transverse process and advanced 2 mm along the course of the L3 medial branch nerve. A similar procedure was performed at L4 medial branch nerve at the junction of the L5 transverse process and superior articulating process. A similar procedure was performed at the L5 dorsal ramus at the junction of the S1 superior articular process and right sacral ala. This was then repeated at the same levels on the left in a similar fashion. A lateral fluoroscopic image was taken to confirm appropriate needle placement. At each location after negative aspiration, 0.5 ml of 2% lidocaine was injected. All needles were removed      Anesthesia: local anesthesia, PO Xanax 0.5 mg  Complications: none      Juan Stark MD

## 2023-11-20 NOTE — POST-PROCEDURE NOTE
0847: Pt returned alert and oriented. Pt is not in any distress. Pt states pain is a 7/10. Band aid to back is c/d/I. Pt is able to stand and ambulate to wheelchair without difficulty. Pt educated on discharge instructions.

## 2023-11-20 NOTE — PRE-PROCEDURE NOTE
Patient states he would like a Xanax. No current consent for this procedure as yet-informe.  Patient has a  in [facility]/R.

## 2023-11-20 NOTE — LETTER
November 21, 2023     Patient: Chris Rivas   YOB: 1970   Date of Visit: 11/20/2023       To Whom It May Concern:    Chris Rivas was seen in my clinic on 11/20/2023 at 8:30 am. Please excuse Chris for his absence from work on this day to make the appointment.    If you have any questions or concerns, please don't hesitate to call.         Sincerely,         Juan Stark MD        CC: No Recipients

## 2023-11-22 ENCOUNTER — APPOINTMENT (OUTPATIENT)
Dept: PAIN MEDICINE | Facility: CLINIC | Age: 53
End: 2023-11-22
Payer: COMMERCIAL

## 2023-11-22 ENCOUNTER — OFFICE VISIT (OUTPATIENT)
Dept: PAIN MEDICINE | Facility: CLINIC | Age: 53
End: 2023-11-22
Payer: COMMERCIAL

## 2023-11-22 VITALS
SYSTOLIC BLOOD PRESSURE: 122 MMHG | HEART RATE: 68 BPM | HEIGHT: 78 IN | BODY MASS INDEX: 23.72 KG/M2 | WEIGHT: 205 LBS | RESPIRATION RATE: 16 BRPM | DIASTOLIC BLOOD PRESSURE: 80 MMHG

## 2023-11-22 DIAGNOSIS — M48.061 DEGENERATIVE LUMBAR SPINAL STENOSIS: ICD-10-CM

## 2023-11-22 DIAGNOSIS — M79.18 MYOFASCIAL PAIN: ICD-10-CM

## 2023-11-22 DIAGNOSIS — M47.816 LUMBAR FACET ARTHROPATHY: Primary | ICD-10-CM

## 2023-11-22 PROCEDURE — 99213 OFFICE O/P EST LOW 20 MIN: CPT | Performed by: STUDENT IN AN ORGANIZED HEALTH CARE EDUCATION/TRAINING PROGRAM

## 2023-11-22 RX ORDER — MIDAZOLAM HYDROCHLORIDE 1 MG/ML
1 INJECTION INTRAMUSCULAR; INTRAVENOUS AS NEEDED
Status: CANCELLED | OUTPATIENT
Start: 2023-11-22

## 2023-11-22 ASSESSMENT — PAIN SCALES - GENERAL
PAINLEVEL_OUTOF10: 6
PAINLEVEL: 6

## 2023-11-22 ASSESSMENT — PAIN - FUNCTIONAL ASSESSMENT: PAIN_FUNCTIONAL_ASSESSMENT: 0-10

## 2023-11-22 ASSESSMENT — PATIENT HEALTH QUESTIONNAIRE - PHQ9
2. FEELING DOWN, DEPRESSED OR HOPELESS: NOT AT ALL
SUM OF ALL RESPONSES TO PHQ9 QUESTIONS 1 AND 2: 0
1. LITTLE INTEREST OR PLEASURE IN DOING THINGS: NOT AT ALL

## 2023-11-22 ASSESSMENT — PAIN DESCRIPTION - DESCRIPTORS: DESCRIPTORS: ACHING

## 2023-11-22 NOTE — PROGRESS NOTES
[facility]  Follow Up Office Visit Note 2023    Patient Information: Chris Rivas, MRN: [id number], : 1970   Primary Care/Referring Physician: Beka Gomez DO, [address] [facility] [address] / [facility] [id number]     Chief Complaint: Low back and leg pain  Interval History: He returns today for follow up after his 2nd bilateral L4/5, L5/S1 medial branch nerve blocks    Today he again reports 80% improvement in pain after this procedure and would like to proceed with a RF ablation. His pain has returned to baseline since the injection.     Brief History of Pain: Mr. Chris Rivas is a 53 y.o. male with a PMHx of HLD, dysphagia, anxiety who presents for evaluation of low back, neck, and shoulder pain. He states his low back is the most signficant area of pain thus will focus on that today    He reports low back pain for 25 years that has been progressively worsening. He states he 'fractured' vertebrae in his low back many years ago but did not require surgery. He describes the pain as a midline stabbing, throbbing, and grinding. His low back pain worsens with walking, twisting. The pain occasionally radiates down his legs, right moreso than the left. His entire right foot is numb, and he occasionally gets numbness further up the leg. His left foot also occasionally gets numb. His Gabapentin dose was increased recently with some improvement in his foot pain but no improvement in his low back pain. He states Diclofenac is working better than Meloxicam but he is still having quite a lot of pain    Regarding his neck and left shoulder, he states he 'broke his neck' in  after diving into a pool but again did not require surgery. He has a lot of left shoulder pain, and occasionally right shoulder pain, when trying to lift his arms above his head.    Current Pain Medications: Diclofenac 75 mg BID PRN, Gabapentin 600 mg TID  Previously Tried Pain Medications: Meloxicam, PO steroids, Duloxetine -  caused diarrhea    Relevant Surgeries: Denies neck or low back surgery  Injections: L4/5 HAYLEY - no pain relief    Physical/Occupational Therapy: No recent PT for his low back    Medications:   Current Outpatient Medications   Medication Instructions    albuterol 90 mcg/actuation inhaler 2 puffs as needed Inhalation every 4-6 hrs    cyanocobalamin (Vitamin B-12) 1,000 mcg tablet 1 tablet, oral, Every 24 hours    diclofenac (Voltaren) 75 mg EC tablet Take 1 tablet (75 mg) by mouth 2 times a day.    gabapentin (NEURONTIN) 600 mg, oral, 3 times daily    gabapentin (NEURONTIN) 600 mg, oral, 3 times daily    LORazepam (Ativan) 0.5 mg tablet Take 1 tablet (0.5 mg) by mouth 2 times a day as needed for anxiety.    PARoxetine (PAXIL) 10 mg, oral, Nightly      Allergies:   Allergies   Allergen Reactions    Lavender Anaphylaxis    Percocet [Oxycodone-Acetaminophen] Nausea/vomiting       Past Medical & Surgical History:  Past Medical History:   Diagnosis Date    Hyperlipidemia     Lower back pain     Lumbar spondylosis     Panic attacks       Past Surgical History:   Procedure Laterality Date    APPENDECTOMY  08/08/2014    Appendectomy    JOINT REPLACEMENT Left     ELBOW    JOINT REPLACEMENT Right     ANKLE    LUNG SURGERY  10/01/2014    Lung Surgery       Family History   Problem Relation Name Age of Onset    Leukemia Mother      Dementia Father       Social History     Socioeconomic History    Marital status: Single     Spouse name: Not on file    Number of children: Not on file    Years of education: Not on file    Highest education level: Not on file   Occupational History    Not on file   Tobacco Use    Smoking status: Every Day     Packs/day: 0.50     Years: 3.00     Additional pack years: 0.00     Total pack years: 1.50     Types: Cigarettes     Start date: 1/1/2020    Smokeless tobacco: Not on file   Vaping Use    Vaping Use: Never used   Substance and Sexual Activity    Alcohol use: Yes     Comment: 2-4 TIMES A MONTH     "Drug use: Never    Sexual activity: Yes   Other Topics Concern    Not on file   Social History Narrative    Not on file     Social Determinants of Health     Financial Resource Strain: Not on file   Food Insecurity: Not on file   Transportation Needs: Not on file   Physical Activity: Not on file   Stress: Not on file   Social Connections: Not on file   Intimate Partner Violence: Not on file   Housing Stability: Not on file       Problems, Past medical history, past surgical history, Medications, allergies, social and family history reviewed and as per the electronic medical record from today's encounter    Review of Systems:  CONST: No fever, chills, fatigue, weight changes  EYES: No loss of vision  ENT: No hearing loss, tinnitus  CV: No chest pain, palpitations  RESP: No dyspnea, shortness of breath, cough  GI: No stool incontinence, nausea, vomiting  : No urinary incontinence  MSK: No joint swelling  SKIN: No rash, no hives  NEURO: No headache, dizziness, weakness  PSYCH: Reports anxiety, depression  HEM/LYMPH: No easy bruising or bleeding  All other systems reviewed are negative     Physical Exam:  Vitals: /80   Pulse 68   Resp 16   Ht 2.007 m (6' 7\")   Wt 93 kg (205 lb)   BMI 23.09 kg/m²   General: No apparent distress. Alert, appropriate, oriented x 3. Mood generally positive, affect congruent. Speaking in full sentences.   HENT: Normocephalic, atraumatic. Hearing intact.  Eyes: Pupils equal and round  Neck: Supple, trachea midline  Lungs: Symmetric respiratory excursion on visual exam, nonlabored breathing.   Extremities: No cyanosis or edema noted in extremities.  Skin: No rashes, lesions noted.  Neuro: Alert and appropriate. Gait within normal limits. Bulk and tone within normal limits.    Laboratory Data:  The following laboratory data were reviewed during this visit:   Lab Results   Component Value Date    WBC 4.4 (L) 09/22/2022    RBC 4.01 (L) 09/22/2022    HGB 12.8 (L) 09/22/2022    HCT 39.0 " "(L) 09/22/2022     09/22/2022      No results found for: \"INR\"  Lab Results   Component Value Date    CREATININE 0.8 09/22/2022       Imaging:  The following imaging impressions were reviewed by me during this visit:    -7/20/23 lumbar spine MRI shows L2-L3 left foraminal disc protrusion and left lateral disc protrusion with facet hypertrophy, moderate left-sided neural foraminal narrowing. L4-L5 diffuse disc bulging, facet hypertrophy, moderate to severe canal stenosis with moderate left-sided neural foraminal narrowing. L5-S1 diffuse disc bulging with superimposed left foraminal disc protrusion and facet hypertrophy, severe left-sided neural foraminal narrowing and mild-to-moderate right-sided neural foraminal narrowing  -8/23/23 left shoulder xray shows no acute fracture or glenohumeral dislocation. No acromioclavicular seperation. Mild acromioclavicular degenerative changes.  I also personally reviewed the images from the above studies myself. These images and my interpretation of them contributed to the management and decision making of the patient's medical plan.    ASSESSMENT:  Mr. Chris Rivas is a 53 y.o. male with low back and leg pain that is consistent with:    1. Lumbar facet arthropathy    2. Degenerative lumbar spinal stenosis    3. Myofascial pain            PLAN:  Radiology: I reviewed his 7/20/23 lumbar spine MRI which shows L2-L3 left foraminal disc protrusion and left lateral disc protrusion with facet hypertrophy, moderate left-sided neural foraminal narrowing. L4-L5 diffuse disc bulging, facet hypertrophy, moderate to severe canal stenosis with moderate left-sided neural foraminal narrowing. L5-S1 diffuse disc bulging with superimposed left foraminal disc protrusion and facet hypertrophy, severe left-sided neural foraminal narrowing and mild-to-moderate right-sided neural foraminal narrowing    Physically: I believe he would benefit from referral for land and pool based therapy, which " was provided today. I believe his current pain level may make it difficult to meaningfully participate in PT thus would like to attempt to control his pain with medication and intervention prior to starting this    Psychologically: I suspect there is some component of anxiety causing pain amplification. Continue following with your PCP and would consider referral to a behavioral health provider in the future    Medication: -Continue Gabapentin 600 mg TID, his dose was recently increased by his PCP with some improvement    Duration: Multiple years    Intervention: I suspect his pain is multifactorial in the setting of lumbar spinal stenosis, lumbar facet arthropathy, lumbar radiculopathy, myofascial pain, and possible left shoulder rotator cuff tendinopathy. His MRI does show fairly advanced central and foraminal stenosis which likely accounts for some of the low back and leg pain he is experiencing. He underwent an HAYLEY at L4/5 with no improvement. He underwent bilateral lumbar medial branch nerve blocks at L4/5, L5/S1 x2 with 80% improvement in his pain each time. He would like to proceed with a RF ablation utilizing live fluoroscopy and IV sedation  -Given the severity of the degenerative changes noted, as well as evidence of numbness in his lower extremities, he may require decompressive surgery in the future. It seems he would like to see Dr. Almazan if he decides to move forward with this        Sincerely,  Juan Stark MD  [facility] - [place]

## 2023-12-01 ENCOUNTER — TELEPHONE (OUTPATIENT)
Dept: PAIN MEDICINE | Facility: CLINIC | Age: 53
End: 2023-12-01
Payer: COMMERCIAL

## 2023-12-01 NOTE — TELEPHONE ENCOUNTER
Pt lm to confirm 12/8/23 dos at [facility]. I called him back to confirm info and also to call/schedule an follow up at the end of dec or in Jan.    Lm for pt to call/schedule rfa at [facility].

## 2023-12-08 ENCOUNTER — OUTSIDE PROCEDURE (OUTPATIENT)
Dept: PAIN MEDICINE | Facility: CLINIC | Age: 53
End: 2023-12-08
Payer: COMMERCIAL

## 2023-12-08 ENCOUNTER — ANCILLARY PROCEDURE (OUTPATIENT)
Dept: RADIOLOGY | Facility: CLINIC | Age: 53
End: 2023-12-08
Payer: COMMERCIAL

## 2023-12-08 DIAGNOSIS — M47.816 LUMBAR FACET ARTHROPATHY: Primary | ICD-10-CM

## 2023-12-08 DIAGNOSIS — M47.816 SPONDYLOSIS WITHOUT MYELOPATHY OR RADICULOPATHY, LUMBAR REGION: ICD-10-CM

## 2023-12-08 PROCEDURE — 77003 FLUOROGUIDE FOR SPINE INJECT: CPT | Mod: RSC

## 2023-12-08 NOTE — PROGRESS NOTES
Procedure Note: 2023    PROCEDURE NAME: Bilateral Lumbar Medial Branch Nerve Radiofrequency Ablation, L4/5, L5/S1    Patient Information: Chris Rivas, MRN (from [facility]) [id number], : 1970  Chief Complaint: Low back pain    Pain Diagnosis: The diagnosis of Lumbar facet arthropathy  has been made given the patient's clinical evaluation at prior evaluation.      DESCRIPTION OF PROCEDURE:    Chris Rivas was brought to the procedure room. The assistant obtained vital signs, which were found to be stable. He was then informed of the procedure, its benefits, and its risks, and his questions were answered regarding the procedure. Written informed consent was obtained from the patient. Immediately prior to starting the procedure, a time-out safety check was conducted and the patient's identification, procedure name, and procedure site were confirmed with the patient.    Bilateral L3, L4 medial branch nerve and L5 dorsal ramus radiofrequency ablation:  After informed written consent was obtained, the patient was placed in prone position with a pillow under the abdomen to reduce lumbar lordosis. Monitoring of pulse oximetry, heart rate, and blood pressure was done pre-procedure, and post-procedure. During the procedure the patient was monitored with continuous pulse-ox. A time out was performed before the beginning of the procedure. The correct site and laterality were marked. The skin was prepped with chlorhexidine, allowed to dry for a minimum of 3 minutes, and draped in a sterile fashion    Using an AP fluoroscopic view, the right transverse processes of L4, L5 vertebrae and the sacral ala were identified. The skin and subcutaneous tissue over the needle entry sites were anesthetized with infiltration of 1 mL of 1% lidocaine with a 1.5 in. 25 g needle. A 20-gauge 10 cm radiofrequency needle with a 10 mm active tip was advanced slowly in a co-axial view along the superior margin of the L4 transverse process and  lateral to the L4 superior articulating process. It was directed inferiorly and medially so that the tip struck the junction of the base of the transverse process and advanced 2 mm along the course of the L3 medial branch nerve. A similar procedure was performed at the L4 medial branch at the junction of the L5 superior articular process and transverse process. A similar procedure was performed at the L5 dorsal ramus at the junction of the S1 superior articular process and sacral ala. This was repeated on the left side. A lateral fluoroscopic image was taken to confirm appropriate needle placement and to confirm that the needle tip at each level was posterior to the neuroforamen.    Sensory testing then proceeded. At each level, a stimulation probe was placed, and the impedance was found to be between 200 and 800 Ohms and recorded. Motor testing was then performed. The patient reported a negative motor response in the muscles of the thigh and leg at 2 Volts. This was repeated for each level and recorded.    After the above sensory testing was complete, 1 ml of 2% lidocaine in each level was injected and allowed to take effect for 30 seconds. Lesioning was then performed at 80 degrees for 90 seconds. The patient tolerated the procedure well. They denied any lower extremity pain or paresthesia during the lesioning process. The probes were then removed and handed off the sterile field. Next, 0.5 mL of a mixture of 10 mg dexamethasone diluted in 2.5 mL lidocaine 1% was injected through each needle. The needles were then removed.       Anesthesia: IV sedation, local anesthesia   Complications: none      Juan Stark MD

## 2024-02-12 DIAGNOSIS — M48.061 DEGENERATIVE LUMBAR SPINAL STENOSIS: ICD-10-CM

## 2024-02-12 RX ORDER — GABAPENTIN 600 MG/1
600 TABLET ORAL 3 TIMES DAILY
Qty: 90 TABLET | Refills: 1 | Status: SHIPPED | OUTPATIENT
Start: 2024-02-12 | End: 2024-05-03 | Stop reason: SDUPTHER

## 2024-02-16 DIAGNOSIS — M48.061 DEGENERATIVE LUMBAR SPINAL STENOSIS: Primary | ICD-10-CM

## 2024-02-16 RX ORDER — DICLOFENAC SODIUM 75 MG/1
75 TABLET, DELAYED RELEASE ORAL 2 TIMES DAILY PRN
Qty: 180 TABLET | Refills: 0 | Status: SHIPPED | OUTPATIENT
Start: 2024-02-16

## 2024-02-22 ENCOUNTER — OFFICE VISIT (OUTPATIENT)
Dept: PRIMARY CARE | Facility: CLINIC | Age: 54
End: 2024-02-22
Payer: COMMERCIAL

## 2024-02-22 VITALS
WEIGHT: 214 LBS | BODY MASS INDEX: 24.11 KG/M2 | OXYGEN SATURATION: 94 % | DIASTOLIC BLOOD PRESSURE: 80 MMHG | HEART RATE: 73 BPM | TEMPERATURE: 99 F | SYSTOLIC BLOOD PRESSURE: 122 MMHG

## 2024-02-22 DIAGNOSIS — Z12.5 SCREENING FOR PROSTATE CANCER: ICD-10-CM

## 2024-02-22 DIAGNOSIS — M79.672 LEFT FOOT PAIN: Primary | ICD-10-CM

## 2024-02-22 DIAGNOSIS — E78.00 PURE HYPERCHOLESTEROLEMIA: ICD-10-CM

## 2024-02-22 DIAGNOSIS — Z00.00 ROUTINE GENERAL MEDICAL EXAMINATION AT A HEALTH CARE FACILITY: Primary | ICD-10-CM

## 2024-02-22 DIAGNOSIS — M79.672 LEFT FOOT PAIN: ICD-10-CM

## 2024-02-22 DIAGNOSIS — F41.0 PANIC ATTACK: Primary | ICD-10-CM

## 2024-02-22 DIAGNOSIS — M48.061 DEGENERATIVE LUMBAR SPINAL STENOSIS: ICD-10-CM

## 2024-02-22 DIAGNOSIS — M47.816 LUMBAR FACET ARTHROPATHY: ICD-10-CM

## 2024-02-22 DIAGNOSIS — F41.0 PANIC ATTACK: ICD-10-CM

## 2024-02-22 DIAGNOSIS — Z13.6 ENCOUNTER FOR SCREENING FOR VASCULAR DISEASE: ICD-10-CM

## 2024-02-22 PROCEDURE — 99214 OFFICE O/P EST MOD 30 MIN: CPT | Performed by: FAMILY MEDICINE

## 2024-02-22 RX ORDER — LORAZEPAM 0.5 MG/1
0.5 TABLET ORAL 2 TIMES DAILY PRN
Qty: 30 TABLET | Refills: 2 | Status: SHIPPED | OUTPATIENT
Start: 2024-02-22

## 2024-02-22 RX ORDER — PAROXETINE 10 MG/1
10 TABLET, FILM COATED ORAL NIGHTLY
Qty: 90 TABLET | Refills: 1 | Status: SHIPPED | OUTPATIENT
Start: 2024-02-22

## 2024-02-22 RX ORDER — LORAZEPAM 0.5 MG/1
0.5 TABLET ORAL 2 TIMES DAILY PRN
Qty: 30 TABLET | Refills: 2 | Status: SHIPPED | OUTPATIENT
Start: 2024-02-22 | End: 2024-02-22 | Stop reason: SDUPTHER

## 2024-02-22 ASSESSMENT — ENCOUNTER SYMPTOMS
DEPRESSION: 0
LOSS OF SENSATION IN FEET: 0
OCCASIONAL FEELINGS OF UNSTEADINESS: 0

## 2024-02-22 ASSESSMENT — PATIENT HEALTH QUESTIONNAIRE - PHQ9
2. FEELING DOWN, DEPRESSED OR HOPELESS: NOT AT ALL
SUM OF ALL RESPONSES TO PHQ9 QUESTIONS 1 & 2: 0
1. LITTLE INTEREST OR PLEASURE IN DOING THINGS: NOT AT ALL

## 2024-02-22 ASSESSMENT — PAIN SCALES - GENERAL: PAINLEVEL: 5

## 2024-02-22 NOTE — PATIENT INSTRUCTIONS
For anxiety - stable on regimen.     For pain - seeing pain management - doing well after RFA.  Continue gabapentin and diclofenac.     For foot pain - xray of foot.  Concern for 5th metatarsal fracture.  Ice, diclofenac, topical biofreeze,     Follow up in 6 months - blood work 1 week prior.

## 2024-02-22 NOTE — PROGRESS NOTES
Subjective   Patient ID: Chris Rivas is a 53 y.o. male who presents for 6 MO F/U.    Osteoarthritis/Pain Management  -History of: Lumbar facet arthropathy, Lumbar DDD  -F/U: Seeing pain management.  Had epidural injections - did well.  Did RFA - doing very well.  More functional.    -Treatment: RFA, epidural injections, gabapentin, diclofenac.   -Past Evaluation: xrays, MRI  -Specialist: Dr. Stark  -Past Medications:     Anxiety  -F/U:  Mood has been stable.  Manageable.  Using paxil - tolerating well.  Lorazepam - using as needed. Stress with work  -Symptoms have been stable    -She denies current suicidal and homicidal ideation.    -Current Treatment: Paxil, Lorazepam.    -Counseling: none currently  -Previous treatment includes:     Pt dropped 25 pound weight - injured toe.  Pain for a few weeks.  Then started to improve, then worsened again.  Left foot.  Outside of foot.             Review of Systems    Objective   /80 (BP Location: Right arm, Patient Position: Sitting)   Pulse 73   Temp 37.2 °C (99 °F) (Temporal)   Wt 97.1 kg (214 lb)   SpO2 94%   BMI 24.11 kg/m²     Physical Exam  Vitals reviewed.   Constitutional:       General: He is not in acute distress.  Cardiovascular:      Rate and Rhythm: Normal rate and regular rhythm.   Pulmonary:      Effort: Pulmonary effort is normal.      Breath sounds: No wheezing or rhonchi.   Musculoskeletal:      Right lower leg: No edema.      Left lower leg: No edema.      Comments: Left foot - lateral foot pain at proximal 5th metatarsal, deformity present, DP and TP 2/4, decreased ROM in foot due to pain   Lymphadenopathy:      Cervical: No cervical adenopathy.   Neurological:      Mental Status: He is alert.         Assessment/Plan   Diagnoses and all orders for this visit:  Panic attack  Lumbar facet arthropathy  Degenerative lumbar spinal stenosis  Left foot pain    Patient Instructions   For anxiety - stable on regimen.     For pain - seeing pain  management - doing well after RFA.  Continue gabapentin and diclofenac.     For foot pain - xray of foot.  Concern for 5th metatarsal fracture.  Ice, diclofenac, topical biofreeze,     Follow up in 6 months - blood work 1 week prior.

## 2024-02-23 ENCOUNTER — TELEPHONE (OUTPATIENT)
Dept: PRIMARY CARE | Facility: CLINIC | Age: 54
End: 2024-02-23
Payer: COMMERCIAL

## 2024-02-23 ENCOUNTER — HOSPITAL ENCOUNTER (OUTPATIENT)
Dept: RADIOLOGY | Facility: HOSPITAL | Age: 54
Discharge: HOME | End: 2024-02-23
Payer: COMMERCIAL

## 2024-02-23 DIAGNOSIS — S92.352A CLOSED FRACTURE OF FIFTH METATARSAL BONE OF LEFT FOOT, PHYSEAL INVOLVEMENT UNSPECIFIED, INITIAL ENCOUNTER: ICD-10-CM

## 2024-02-23 DIAGNOSIS — M79.672 LEFT FOOT PAIN: ICD-10-CM

## 2024-02-23 PROCEDURE — 73630 X-RAY EXAM OF FOOT: CPT | Mod: LT

## 2024-02-23 NOTE — TELEPHONE ENCOUNTER
----- Message from Beka Gomez DO sent at 2/23/2024  2:11 PM EST -----  Patient likely has fracture of 5th metatarsal - needs urgent podiatry eval- Dr Fay.

## 2024-04-26 ENCOUNTER — TELEPHONE (OUTPATIENT)
Dept: PAIN MEDICINE | Facility: CLINIC | Age: 54
End: 2024-04-26
Payer: COMMERCIAL

## 2024-05-03 ENCOUNTER — TELEPHONE (OUTPATIENT)
Dept: PAIN MEDICINE | Facility: CLINIC | Age: 54
End: 2024-05-03
Payer: COMMERCIAL

## 2024-05-03 DIAGNOSIS — M48.061 DEGENERATIVE LUMBAR SPINAL STENOSIS: ICD-10-CM

## 2024-05-03 RX ORDER — GABAPENTIN 600 MG/1
600 TABLET ORAL 3 TIMES DAILY
Qty: 90 TABLET | Refills: 2 | Status: SHIPPED | OUTPATIENT
Start: 2024-05-03

## 2024-07-05 DIAGNOSIS — M48.061 DEGENERATIVE LUMBAR SPINAL STENOSIS: ICD-10-CM

## 2024-07-05 RX ORDER — DICLOFENAC SODIUM 75 MG/1
75 TABLET, DELAYED RELEASE ORAL 2 TIMES DAILY PRN
Qty: 180 TABLET | Refills: 0 | Status: SHIPPED | OUTPATIENT
Start: 2024-07-05

## 2024-07-05 NOTE — TELEPHONE ENCOUNTER
Phone call from the patient requesting a refill of Diclofenac. Patient's last appointment was in 2023. No follow up appointment scheduled.

## 2024-08-20 DIAGNOSIS — F41.0 PANIC ATTACK: ICD-10-CM

## 2024-08-20 RX ORDER — PAROXETINE 10 MG/1
10 TABLET, FILM COATED ORAL NIGHTLY
Qty: 90 TABLET | Refills: 1 | Status: SHIPPED | OUTPATIENT
Start: 2024-08-20

## 2024-08-20 ASSESSMENT — PROMIS GLOBAL HEALTH SCALE
RATE_GENERAL_HEALTH: GOOD
EMOTIONAL_PROBLEMS: RARELY
RATE_MENTAL_HEALTH: GOOD
CARRYOUT_SOCIAL_ACTIVITIES: VERY GOOD
RATE_SOCIAL_SATISFACTION: GOOD
RATE_AVERAGE_FATIGUE: MODERATE
CARRYOUT_PHYSICAL_ACTIVITIES: MOSTLY
RATE_QUALITY_OF_LIFE: VERY GOOD
RATE_PHYSICAL_HEALTH: GOOD
RATE_AVERAGE_PAIN: 9

## 2024-08-22 ENCOUNTER — OFFICE VISIT (OUTPATIENT)
Dept: PRIMARY CARE | Facility: CLINIC | Age: 54
End: 2024-08-22
Payer: COMMERCIAL

## 2024-08-22 VITALS
OXYGEN SATURATION: 94 % | WEIGHT: 214.2 LBS | DIASTOLIC BLOOD PRESSURE: 60 MMHG | BODY MASS INDEX: 24.13 KG/M2 | SYSTOLIC BLOOD PRESSURE: 102 MMHG | HEART RATE: 76 BPM

## 2024-08-22 DIAGNOSIS — F41.0 PANIC ATTACK: ICD-10-CM

## 2024-08-22 DIAGNOSIS — M25.512 CHRONIC LEFT SHOULDER PAIN: ICD-10-CM

## 2024-08-22 DIAGNOSIS — R06.2 WHEEZING: ICD-10-CM

## 2024-08-22 DIAGNOSIS — M54.12 CERVICAL RADICULOPATHY: ICD-10-CM

## 2024-08-22 DIAGNOSIS — M54.2 NECK PAIN: ICD-10-CM

## 2024-08-22 DIAGNOSIS — G89.29 CHRONIC LEFT SHOULDER PAIN: ICD-10-CM

## 2024-08-22 DIAGNOSIS — M48.061 DEGENERATIVE LUMBAR SPINAL STENOSIS: ICD-10-CM

## 2024-08-22 DIAGNOSIS — E78.00 PURE HYPERCHOLESTEROLEMIA: ICD-10-CM

## 2024-08-22 DIAGNOSIS — Z00.00 ROUTINE GENERAL MEDICAL EXAMINATION AT A HEALTH CARE FACILITY: Primary | ICD-10-CM

## 2024-08-22 PROCEDURE — 99396 PREV VISIT EST AGE 40-64: CPT | Performed by: FAMILY MEDICINE

## 2024-08-22 RX ORDER — LORAZEPAM 0.5 MG/1
0.5 TABLET ORAL 2 TIMES DAILY PRN
Qty: 30 TABLET | Refills: 2 | Status: SHIPPED | OUTPATIENT
Start: 2024-08-22

## 2024-08-22 RX ORDER — ALBUTEROL SULFATE 90 UG/1
2 INHALANT RESPIRATORY (INHALATION) EVERY 4 HOURS PRN
Qty: 18 G | Refills: 2 | Status: SHIPPED | OUTPATIENT
Start: 2024-08-22

## 2024-08-22 RX ORDER — METHOCARBAMOL 500 MG/1
500 TABLET, FILM COATED ORAL 3 TIMES DAILY
Qty: 90 TABLET | Refills: 1 | Status: SHIPPED | OUTPATIENT
Start: 2024-08-22 | End: 2024-10-21

## 2024-08-22 ASSESSMENT — LIFESTYLE VARIABLES
SKIP TO QUESTIONS 9-10: 1
HOW MANY STANDARD DRINKS CONTAINING ALCOHOL DO YOU HAVE ON A TYPICAL DAY: PATIENT DOES NOT DRINK
HOW OFTEN DO YOU HAVE SIX OR MORE DRINKS ON ONE OCCASION: NEVER
AUDIT-C TOTAL SCORE: 0
HOW OFTEN DO YOU HAVE A DRINK CONTAINING ALCOHOL: NEVER

## 2024-08-22 ASSESSMENT — PAIN SCALES - GENERAL: PAINLEVEL: 9

## 2024-08-22 ASSESSMENT — ENCOUNTER SYMPTOMS
DEPRESSION: 0
LOSS OF SENSATION IN FEET: 0
OCCASIONAL FEELINGS OF UNSTEADINESS: 0

## 2024-08-22 NOTE — PATIENT INSTRUCTIONS
Here for physical.  Order for blood work in system.     For anxiety - continue paroxetine and lorazepam as needed.     For low back - doing well after procedure.  For upper back/neck - follow up with pain management.      For shoulder pain - ?frozen shoulder vs worsening arthritis.  Recommend evaluation by Dr. Vizcarra    For neck pain - check xray.  Call pain management for further evaluation.  For pain we will add robaxin.     Follow up in 6 months.

## 2024-08-22 NOTE — PROGRESS NOTES
Subjective   Patient ID: Chris Rivas is a 54 y.o. male who presents for Annual Exam.    Here for physical.  Pt was seeing Dr. Fay - pt had 5th digit fracture.  Placed in brace for 1 month - symptoms improved.      Osteoarthritis/Pain Management  -History of: Lumbar facet arthropathy, Lumbar DDD  -F/U: Seeing pain management.  Had epidural injections - did well.  Did RFA - doing very well.  More functional.  Pt is still doing well.  Low back manageable.  Now having thoracic and neck pain.  Medication not really helping.    -Treatment: RFA, epidural injections, gabapentin, diclofenac.   -Past Evaluation: xrays, MRI  -Specialist: Dr. Stark  -Past Medications:     Anxiety  -F/U:  Mood has been stable.  Manageable.  Using paxil - tolerating well.  Lorazepam - using as needed. Stress with work - no new changes.  Patient quit alcohol.    -Symptoms have been stable    -She denies current suicidal and homicidal ideation.    -Current Treatment: Paxil, Lorazepam.    -Counseling: none currently  -Previous treatment includes:     Left Shoulder Pain  -Pain in axilla, posterior pain.  Pain over trap - does radiate to hand.  Pushing, raising arm increase pain.    -Using diclofenac and gabapentin - not helping.             Review of Systems    Objective   Wt 97.2 kg (214 lb 3.2 oz)   BMI 24.13 kg/m²     Physical Exam  Vitals reviewed.   Constitutional:       General: He is not in acute distress.  Cardiovascular:      Rate and Rhythm: Normal rate and regular rhythm.   Pulmonary:      Effort: Pulmonary effort is normal.      Breath sounds: No wheezing or rhonchi.   Musculoskeletal:      Right lower leg: No edema.      Left lower leg: No edema.      Comments: Left Shoulder:  ROM - decreased in all ROM  Strength:       -Abduction: 4/5       -Int Rotation: unable to test       -Ext Rotation: 4/5  Special Tests       -Empty Can:  +       -Neers: +       -Teixeira: +       -Lift Off: unable to test       -Drop Arm: -       -Cross  Arm: +       -Obriens:        -Speed's:       -Yergason's:     Lymphadenopathy:      Cervical: No cervical adenopathy.   Neurological:      Mental Status: He is alert.         Assessment/Plan   Diagnoses and all orders for this visit:  Routine general medical examination at a health care facility  Panic attack  -     LORazepam (Ativan) 0.5 mg tablet; Take 1 tablet (0.5 mg) by mouth 2 times a day as needed for anxiety.  Pure hypercholesterolemia  Degenerative lumbar spinal stenosis  -     methocarbamol (Robaxin) 500 mg tablet; Take 1 tablet (500 mg) by mouth 3 times a day.  Chronic left shoulder pain  -     Referral to Orthopaedic Surgery; Future  Neck pain  -     XR cervical spine complete 4-5 views; Future  Cervical radiculopathy  Wheezing  -     albuterol 90 mcg/actuation inhaler; Inhale 2 puffs every 4 hours if needed for wheezing.    Patient Instructions   Here for physical.  Order for blood work in system.     For anxiety - continue paroxetine and lorazepam as needed.     For low back - doing well after procedure.  For upper back/neck - follow up with pain management.      For shoulder pain - ?frozen shoulder vs worsening arthritis.  Recommend evaluation by Dr. Vizcarra    For neck pain - check xray.  Call pain management for further evaluation.  For pain we will add robaxin.     Follow up in 6 months.

## 2024-08-23 ENCOUNTER — LAB (OUTPATIENT)
Dept: LAB | Facility: LAB | Age: 54
End: 2024-08-23
Payer: COMMERCIAL

## 2024-08-23 ENCOUNTER — HOSPITAL ENCOUNTER (OUTPATIENT)
Dept: RADIOLOGY | Facility: HOSPITAL | Age: 54
Discharge: HOME | End: 2024-08-23
Payer: COMMERCIAL

## 2024-08-23 DIAGNOSIS — E78.00 PURE HYPERCHOLESTEROLEMIA: ICD-10-CM

## 2024-08-23 DIAGNOSIS — F41.0 PANIC ATTACK: ICD-10-CM

## 2024-08-23 DIAGNOSIS — Z12.5 SCREENING FOR PROSTATE CANCER: ICD-10-CM

## 2024-08-23 DIAGNOSIS — M54.2 NECK PAIN: ICD-10-CM

## 2024-08-23 DIAGNOSIS — Z00.00 ROUTINE GENERAL MEDICAL EXAMINATION AT A HEALTH CARE FACILITY: ICD-10-CM

## 2024-08-23 LAB
ALBUMIN SERPL BCP-MCNC: 4.2 G/DL (ref 3.4–5)
ALP SERPL-CCNC: 57 U/L (ref 33–120)
ALT SERPL W P-5'-P-CCNC: 14 U/L (ref 10–52)
ANION GAP SERPL CALC-SCNC: 11 MMOL/L (ref 10–20)
AST SERPL W P-5'-P-CCNC: 18 U/L (ref 9–39)
BASOPHILS # BLD AUTO: 0.09 X10*3/UL (ref 0–0.1)
BASOPHILS NFR BLD AUTO: 1.5 %
BILIRUB SERPL-MCNC: 0.4 MG/DL (ref 0–1.2)
BUN SERPL-MCNC: 31 MG/DL (ref 6–23)
CALCIUM SERPL-MCNC: 9.6 MG/DL (ref 8.6–10.3)
CHLORIDE SERPL-SCNC: 106 MMOL/L (ref 98–107)
CHOLEST SERPL-MCNC: 230 MG/DL (ref 0–199)
CHOLESTEROL/HDL RATIO: 4.2
CO2 SERPL-SCNC: 25 MMOL/L (ref 21–32)
CREAT SERPL-MCNC: 0.98 MG/DL (ref 0.5–1.3)
EGFRCR SERPLBLD CKD-EPI 2021: >90 ML/MIN/1.73M*2
EOSINOPHIL # BLD AUTO: 0.27 X10*3/UL (ref 0–0.7)
EOSINOPHIL NFR BLD AUTO: 4.4 %
ERYTHROCYTE [DISTWIDTH] IN BLOOD BY AUTOMATED COUNT: 13.8 % (ref 11.5–14.5)
GLUCOSE SERPL-MCNC: 91 MG/DL (ref 74–99)
HCT VFR BLD AUTO: 36.7 % (ref 41–52)
HDLC SERPL-MCNC: 54.5 MG/DL
HGB BLD-MCNC: 11.5 G/DL (ref 13.5–17.5)
IMM GRANULOCYTES # BLD AUTO: 0.01 X10*3/UL (ref 0–0.7)
IMM GRANULOCYTES NFR BLD AUTO: 0.2 % (ref 0–0.9)
LDLC SERPL CALC-MCNC: 163 MG/DL
LYMPHOCYTES # BLD AUTO: 1.76 X10*3/UL (ref 1.2–4.8)
LYMPHOCYTES NFR BLD AUTO: 28.4 %
MCH RBC QN AUTO: 30.7 PG (ref 26–34)
MCHC RBC AUTO-ENTMCNC: 31.3 G/DL (ref 32–36)
MCV RBC AUTO: 98 FL (ref 80–100)
MONOCYTES # BLD AUTO: 0.47 X10*3/UL (ref 0.1–1)
MONOCYTES NFR BLD AUTO: 7.6 %
NEUTROPHILS # BLD AUTO: 3.59 X10*3/UL (ref 1.2–7.7)
NEUTROPHILS NFR BLD AUTO: 57.9 %
NON HDL CHOLESTEROL: 176 MG/DL (ref 0–149)
NRBC BLD-RTO: 0 /100 WBCS (ref 0–0)
PLATELET # BLD AUTO: 253 X10*3/UL (ref 150–450)
POTASSIUM SERPL-SCNC: 4.1 MMOL/L (ref 3.5–5.3)
PROT SERPL-MCNC: 7.9 G/DL (ref 6.4–8.2)
RBC # BLD AUTO: 3.74 X10*6/UL (ref 4.5–5.9)
SODIUM SERPL-SCNC: 138 MMOL/L (ref 136–145)
TRIGL SERPL-MCNC: 62 MG/DL (ref 0–149)
TSH SERPL-ACNC: 0.66 MIU/L (ref 0.44–3.98)
VLDL: 12 MG/DL (ref 0–40)
WBC # BLD AUTO: 6.2 X10*3/UL (ref 4.4–11.3)

## 2024-08-23 PROCEDURE — 85025 COMPLETE CBC W/AUTO DIFF WBC: CPT

## 2024-08-23 PROCEDURE — 84443 ASSAY THYROID STIM HORMONE: CPT

## 2024-08-23 PROCEDURE — 80053 COMPREHEN METABOLIC PANEL: CPT

## 2024-08-23 PROCEDURE — 72040 X-RAY EXAM NECK SPINE 2-3 VW: CPT

## 2024-08-23 PROCEDURE — 84153 ASSAY OF PSA TOTAL: CPT

## 2024-08-23 PROCEDURE — 80061 LIPID PANEL: CPT

## 2024-08-23 PROCEDURE — 36415 COLL VENOUS BLD VENIPUNCTURE: CPT

## 2024-08-24 LAB — PSA SERPL-MCNC: 0.11 NG/ML

## 2024-08-29 ENCOUNTER — OFFICE VISIT (OUTPATIENT)
Dept: PAIN MEDICINE | Facility: CLINIC | Age: 54
End: 2024-08-29
Payer: COMMERCIAL

## 2024-08-29 VITALS
HEIGHT: 78 IN | SYSTOLIC BLOOD PRESSURE: 109 MMHG | OXYGEN SATURATION: 96 % | HEART RATE: 68 BPM | WEIGHT: 214 LBS | BODY MASS INDEX: 24.76 KG/M2 | DIASTOLIC BLOOD PRESSURE: 70 MMHG

## 2024-08-29 DIAGNOSIS — M54.12 CERVICAL RADICULOPATHY: ICD-10-CM

## 2024-08-29 DIAGNOSIS — M47.812 ARTHROPATHY OF CERVICAL FACET JOINT: ICD-10-CM

## 2024-08-29 DIAGNOSIS — M25.812 IMPINGEMENT OF LEFT SHOULDER: Primary | ICD-10-CM

## 2024-08-29 DIAGNOSIS — M79.18 MYOFASCIAL PAIN: ICD-10-CM

## 2024-08-29 PROCEDURE — 99214 OFFICE O/P EST MOD 30 MIN: CPT | Performed by: STUDENT IN AN ORGANIZED HEALTH CARE EDUCATION/TRAINING PROGRAM

## 2024-08-29 PROCEDURE — 3008F BODY MASS INDEX DOCD: CPT | Performed by: STUDENT IN AN ORGANIZED HEALTH CARE EDUCATION/TRAINING PROGRAM

## 2024-08-29 ASSESSMENT — PATIENT HEALTH QUESTIONNAIRE - PHQ9
SUM OF ALL RESPONSES TO PHQ9 QUESTIONS 1 AND 2: 0
2. FEELING DOWN, DEPRESSED OR HOPELESS: NOT AT ALL
1. LITTLE INTEREST OR PLEASURE IN DOING THINGS: NOT AT ALL

## 2024-08-29 ASSESSMENT — ENCOUNTER SYMPTOMS
OCCASIONAL FEELINGS OF UNSTEADINESS: 1
DEPRESSION: 0
LOSS OF SENSATION IN FEET: 1

## 2024-08-29 ASSESSMENT — PAIN - FUNCTIONAL ASSESSMENT: PAIN_FUNCTIONAL_ASSESSMENT: 0-10

## 2024-08-29 ASSESSMENT — PAIN SCALES - GENERAL
PAINLEVEL_OUTOF10: 9
PAINLEVEL: 9

## 2024-08-29 ASSESSMENT — PAIN DESCRIPTION - DESCRIPTORS: DESCRIPTORS: THROBBING;SPASM

## 2024-08-29 NOTE — PROGRESS NOTES
"[facility]  Follow Up Office Visit Note 2024    Patient Information: Chris Rivas, MRN: [id number], : 1970   Primary Care/Referring Physician: Beka Gomez, , [address] [facility] [address] / [facility] [address]     Chief Complaint: Low back and leg pain  Interval History: He returns today for follow up after his bilateral L4/5, L5/S1 medial branch nerve RFA. He reports at least 80% improvement in his low back and this is still feeling very good.    Today he reports worsening pain in the neck, left shoulder, and left arm. This has been an ongoing issue but is getting worse. He describes pain in the neck which radiates down his left arm, with occasional numbness/tingling in the left arm and fingers. The numbness is in all of his fingers. He has difficulty identifying alleviated or exacerbating factors. He does report an incident in  where he dove into a pool and injured his neck, and was \"paralyzed from the chest down\".  He was hospitalized for this but reportedly recovered from this without requiring cervical spine surgery    He has also been having left shoulder pain, which is entirely new. He feels this primarily in his left anterior and posterior shoulder. He has both pain and stiffness, with some difficulty raising his arm above his head.    Brief History of Pain: Mr. Chris Rivas is a 54 y.o. male with a PMHx of HLD, dysphagia, anxiety who presents for evaluation of low back, neck, and shoulder pain. He states his low back is the most signficant area of pain thus will focus on that today    He reports low back pain for 25 years that has been progressively worsening. He states he 'fractured' vertebrae in his low back many years ago but did not require surgery. He describes the pain as a midline stabbing, throbbing, and grinding. His low back pain worsens with walking, twisting. The pain occasionally radiates down his legs, right moreso than the left. His entire right foot is numb, and " he occasionally gets numbness further up the leg. His left foot also occasionally gets numb. His Gabapentin dose was increased recently with some improvement in his foot pain but no improvement in his low back pain. He states Diclofenac is working better than Meloxicam but he is still having quite a lot of pain    Regarding his neck and left shoulder, he states he 'broke his neck' in 2004 after diving into a pool but again did not require surgery. He has a lot of left shoulder pain, and occasionally right shoulder pain, when trying to lift his arms above his head.    Current Pain Medications: Diclofenac 75 mg BID PRN, Gabapentin 600 mg TID, Methocarbamol 500 mg TID  Previously Tried Pain Medications: Meloxicam, PO steroids, Duloxetine - caused diarrhea    Relevant Surgeries: Denies neck or low back surgery. Denies shoulder injections  Injections: L4/5 HAYLEY - no pain relief     Physical/Occupational Therapy: No recent PT for his low back    Medications:   Current Outpatient Medications   Medication Instructions    albuterol 90 mcg/actuation inhaler 2 puffs, inhalation, Every 4 hours PRN    cyanocobalamin (Vitamin B-12) 1,000 mcg tablet 1 tablet, oral, Every 24 hours    diclofenac (VOLTAREN) 75 mg, oral, 2 times daily PRN    gabapentin (NEURONTIN) 600 mg, oral, 3 times daily    LORazepam (ATIVAN) 0.5 mg, oral, 2 times daily PRN    methocarbamol (ROBAXIN) 500 mg, oral, 3 times daily    PARoxetine (PAXIL) 10 mg, oral, Nightly      Allergies:   Allergies   Allergen Reactions    Lavender Anaphylaxis    Oxycodone-Acetaminophen Nausea/vomiting       Past Medical & Surgical History:  Past Medical History:   Diagnosis Date    Anxiety     Arthritis     Chronic pain disorder     Headache     Headache, tension-type     Hyperlipidemia     Lower back pain     Lumbar spondylosis     Neck pain     Panic attacks       Past Surgical History:   Procedure Laterality Date    APPENDECTOMY  08/08/2014    Appendectomy    BACK SURGERY   12/16/23    JOINT REPLACEMENT Left     ELBOW    JOINT REPLACEMENT Right     ANKLE    LUNG SURGERY  10/01/2014    Lung Surgery       Family History   Problem Relation Name Age of Onset    Leukemia Mother Taylor     Cancer Mother Taylor     Dementia Father       Social History     Socioeconomic History    Marital status: Single     Spouse name: Not on file    Number of children: Not on file    Years of education: Not on file    Highest education level: Not on file   Occupational History    Not on file   Tobacco Use    Smoking status: Every Day     Current packs/day: 0.50     Average packs/day: 0.5 packs/day for 5.1 years (2.5 ttl pk-yrs)     Types: Cigarettes     Start date: 1/1/2020    Smokeless tobacco: Never   Vaping Use    Vaping status: Never Used   Substance and Sexual Activity    Alcohol use: Not Currently    Drug use: Never    Sexual activity: Yes     Partners: Female     Birth control/protection: None   Other Topics Concern    Not on file   Social History Narrative    Not on file     Social Determinants of Health     Financial Resource Strain: Not on file   Food Insecurity: Not on file   Transportation Needs: Not on file   Physical Activity: Not on file   Stress: Not on file   Social Connections: Not on file   Intimate Partner Violence: Not on file   Housing Stability: Not on file       Problems, Past medical history, past surgical history, Medications, allergies, social and family history reviewed and as per the electronic medical record from today's encounter    Review of Systems:  CONST: No fever, chills, fatigue, weight changes  EYES: No loss of vision  ENT: No hearing loss, tinnitus  CV: No chest pain, palpitations  RESP: No dyspnea, shortness of breath, cough  GI: No stool incontinence, nausea, vomiting  : No urinary incontinence  MSK: No joint swelling  SKIN: No rash, no hives  NEURO: No headache, dizziness, weakness  PSYCH: Reports anxiety, depression  HEM/LYMPH: No easy bruising or bleeding  All other  "systems reviewed are negative     Physical Exam:  Vitals: /70   Pulse 68   Ht 2.007 m (6' 7\")   Wt 97.1 kg (214 lb)   SpO2 96%   BMI 24.11 kg/m²   General: No apparent distress. Alert, appropriate, oriented x 3. Mood generally positive, affect congruent. Speaking in full sentences.   HENT: Normocephalic, atraumatic. Hearing intact.  Eyes: Pupils equal and round  Neck: Supple, trachea midline  Lungs: Symmetric respiratory excursion on visual exam, nonlabored breathing.   Extremities: No cyanosis or edema noted in extremities.  Skin: No rashes, lesions noted.  MSK: Reports pain but no major weakness with left shoulder resisted external rotation, empty can test, and Neer's test.  Neck: Reports pain with leftward rotation, left lateral bending, and forward flexion of the neck.  Reports tenderness to palpation of the bilateral cervical paraspinal and trapezius muscles.  Neuro: Alert and appropriate. Gait within normal limits. Bulk and tone within normal limits.    Laboratory Data:  The following laboratory data were reviewed during this visit:   Lab Results   Component Value Date    WBC 6.2 08/23/2024    RBC 3.74 (L) 08/23/2024    HGB 11.5 (L) 08/23/2024    HCT 36.7 (L) 08/23/2024     08/23/2024      No results found for: \"INR\"  Lab Results   Component Value Date    CREATININE 0.98 08/23/2024       Imaging:  The following imaging impressions were reviewed by me during this visit:    -7/20/23 lumbar spine MRI shows L2-L3 left foraminal disc protrusion and left lateral disc protrusion with facet hypertrophy, moderate left-sided neural foraminal narrowing. L4-L5 diffuse disc bulging, facet hypertrophy, moderate to severe canal stenosis with moderate left-sided neural foraminal narrowing. L5-S1 diffuse disc bulging with superimposed left foraminal disc protrusion and facet hypertrophy, severe left-sided neural foraminal narrowing and mild-to-moderate right-sided neural foraminal narrowing  -8/23/23 left " shoulder xray shows no acute fracture or glenohumeral dislocation. No acromioclavicular seperation. Mild acromioclavicular degenerative changes.  I also personally reviewed the images from the above studies myself. These images and my interpretation of them contributed to the management and decision making of the patient's medical plan.    ASSESSMENT:  Mr. Chris Rivas is a 54 y.o. male with low back and leg pain that is consistent with:    1. Impingement of left shoulder    2. Cervical radiculopathy    3. Arthropathy of cervical facet joint    4. Myofascial pain        PLAN:  Radiology: -I reviewed his recent cervical spine x-ray which does show mild degenerative changes.  Given high suspicion for cervical radiculopathy I did discuss the possibility of a cervical spine MRI.  I would only order this if he decides to pursue interventional treatment of his neck pain, which she is currently unsure about    Physically: Referral to physical therapy provided today for cervical radiculopathy and likely left shoulder impingement    Psychologically: I suspect there is some component of anxiety causing pain amplification. Continue following with your PCP and would consider referral to a behavioral health provider in the future    Medication: -Continue Gabapentin 600 mg TID and Diclofenac 75 mg BID    Duration: Multiple years    Intervention: I suspect his low back pain is multifactorial in the setting of lumbar spinal stenosis, lumbar facet arthropathy, lumbar radiculopathy, myofascial pain. His MRI does show fairly advanced central and foraminal stenosis which likely accounts for some of the low back and leg pain he is experiencing. He underwent an HAYLEY at L4/5 with no improvement. He underwent bilateral lumbar medial branch nerve RFA at L4/5, L5/S1 with 80% improvement in his pain  -Given the severity of the degenerative changes noted, as well as evidence of numbness in his lower extremities, he may require decompressive  surgery in the future. It seems he would like to see Dr. Almazan if he decides to move forward with this  - Regarding his neck, left arm, and left shoulder pain I suspect this is multifactorial secondary to cervical radiculopathy, left shoulder impingement, and possible cervical facet arthropathy.  Currently his left shoulder is the most severe area of pain and is not improving with regular home exercise and stretching as well as NSAID's.  I recommend an ultrasound-guided left subacromial bursa injection at his next office visit.  Risk, benefits, alternatives discussed.  He would like to proceed.  If deciding to pursue cervical spine intervention I would like to obtain a cervical spine MRI first        Sincerely,  Juan Stark MD  [facility] [place]

## 2024-09-03 ENCOUNTER — OFFICE VISIT (OUTPATIENT)
Dept: ORTHOPEDIC SURGERY | Facility: CLINIC | Age: 54
End: 2024-09-03
Payer: COMMERCIAL

## 2024-09-03 DIAGNOSIS — M75.42 IMPINGEMENT SYNDROME OF LEFT SHOULDER: Primary | ICD-10-CM

## 2024-09-03 DIAGNOSIS — G89.29 CHRONIC LEFT SHOULDER PAIN: ICD-10-CM

## 2024-09-03 DIAGNOSIS — M25.512 CHRONIC LEFT SHOULDER PAIN: ICD-10-CM

## 2024-09-03 DIAGNOSIS — M25.512 LEFT SHOULDER PAIN, UNSPECIFIED CHRONICITY: ICD-10-CM

## 2024-09-03 PROCEDURE — 99213 OFFICE O/P EST LOW 20 MIN: CPT | Performed by: ORTHOPAEDIC SURGERY

## 2024-09-03 PROCEDURE — 99203 OFFICE O/P NEW LOW 30 MIN: CPT | Performed by: ORTHOPAEDIC SURGERY

## 2024-09-03 PROCEDURE — 2500000004 HC RX 250 GENERAL PHARMACY W/ HCPCS (ALT 636 FOR OP/ED): Performed by: ORTHOPAEDIC SURGERY

## 2024-09-03 PROCEDURE — 20611 DRAIN/INJ JOINT/BURSA W/US: CPT | Mod: LT | Performed by: ORTHOPAEDIC SURGERY

## 2024-09-03 PROCEDURE — 2500000005 HC RX 250 GENERAL PHARMACY W/O HCPCS: Performed by: ORTHOPAEDIC SURGERY

## 2024-09-03 ASSESSMENT — PAIN SCALES - GENERAL: PAINLEVEL_OUTOF10: 7

## 2024-09-03 ASSESSMENT — PAIN - FUNCTIONAL ASSESSMENT: PAIN_FUNCTIONAL_ASSESSMENT: 0-10

## 2024-09-04 ENCOUNTER — TELEPHONE (OUTPATIENT)
Dept: PAIN MEDICINE | Facility: CLINIC | Age: 54
End: 2024-09-04
Payer: COMMERCIAL

## 2024-09-04 PROCEDURE — 20611 DRAIN/INJ JOINT/BURSA W/US: CPT | Performed by: ORTHOPAEDIC SURGERY

## 2024-09-04 PROCEDURE — 2500000005 HC RX 250 GENERAL PHARMACY W/O HCPCS: Performed by: ORTHOPAEDIC SURGERY

## 2024-09-04 PROCEDURE — 2500000004 HC RX 250 GENERAL PHARMACY W/ HCPCS (ALT 636 FOR OP/ED): Performed by: ORTHOPAEDIC SURGERY

## 2024-09-04 RX ORDER — TRIAMCINOLONE ACETONIDE 40 MG/ML
40 INJECTION, SUSPENSION INTRA-ARTICULAR; INTRAMUSCULAR
Status: COMPLETED | OUTPATIENT
Start: 2024-09-04 | End: 2024-09-04

## 2024-09-04 RX ORDER — LIDOCAINE HYDROCHLORIDE 10 MG/ML
3 INJECTION INFILTRATION; PERINEURAL
Status: COMPLETED | OUTPATIENT
Start: 2024-09-04 | End: 2024-09-04

## 2024-09-04 ASSESSMENT — ENCOUNTER SYMPTOMS
NECK PAIN: 1
SHORTNESS OF BREATH: 0
MYALGIAS: 1
HEMATOLOGIC/LYMPHATIC NEGATIVE: 1
JOINT SWELLING: 0
BACK PAIN: 1
FATIGUE: 0
FEVER: 0
WHEEZING: 0
ARTHRALGIAS: 1
CHILLS: 0
NECK STIFFNESS: 1

## 2024-09-04 NOTE — PROGRESS NOTES
Reason for Appointment  Chief Complaint   Patient presents with    Left Shoulder - Pain     History of Present Illness  New patient is a 54 y.o. male here today for evaluation of left shoulder pain, has classic impingement syndrome but also has cervical radicular findings and he has been seen by pain management x-rays a year ago showed significant AC joint arthritis but no significant joint arthritis pain is lateral worse with overhead lifting does get some radicular symptoms has not had formal blocks at present.  He is talk about ablation and other procedures no fall on the shoulder no dislocation history lateral pain.  Difficult to sleep.  Full history reviewed.     Past Medical History:   Diagnosis Date    Anxiety     Arthritis     Chronic pain disorder     Headache     Headache, tension-type     Hyperlipidemia     Lower back pain     Lumbar spondylosis     Neck pain     Panic attacks        Past Surgical History:   Procedure Laterality Date    APPENDECTOMY  08/08/2014    Appendectomy    BACK SURGERY  12/16/23    JOINT REPLACEMENT Left     ELBOW    JOINT REPLACEMENT Right     ANKLE    LUNG SURGERY  10/01/2014    Lung Surgery       Medication Documentation Review Audit       Reviewed by Destin Vizcarra MD (Physician) on 09/04/24 at 1302      Medication Order Taking? Sig Documenting Provider Last Dose Status   albuterol 90 mcg/actuation inhaler [id number] Yes Inhale 2 puffs every 4 hours if needed for wheezing. Beka Gomez,  Taking Active   cyanocobalamin (Vitamin B-12) 1,000 mcg tablet [id number] Yes Take 1 tablet (1,000 mcg) by mouth once every 24 hours. Historical Provider, MD Taking Active   diclofenac (Voltaren) 75 mg EC tablet [id number] Yes Take 1 tablet (75 mg) by mouth 2 times a day as needed (Back pain). Juan Stark MD Taking Active   gabapentin (Neurontin) 600 mg tablet [id number] Yes Take 1 tablet (600 mg) by mouth 3 times a day. Juan Stark MD Taking Active   LORazepam (Ativan) 0.5 mg  tablet [id number] Yes Take 1 tablet (0.5 mg) by mouth 2 times a day as needed for anxiety. Beka Gomez, DO Taking Active   methocarbamol (Robaxin) 500 mg tablet [id number] Yes Take 1 tablet (500 mg) by mouth 3 times a day. Beka Gomez, DO Taking Active   PARoxetine (Paxil) 10 mg tablet [id number] Yes TAKE ONE TABLET BY MOUTH ONCE DAILY AT BEDTIME Beka Gomez, DO Taking Active                    Allergies   Allergen Reactions    Lavender Anaphylaxis    Oxycodone-Acetaminophen Nausea/vomiting       Review of Systems   Constitutional:  Negative for chills, fatigue and fever.   Respiratory:  Negative for shortness of breath and wheezing.    Cardiovascular:  Negative for chest pain and leg swelling.   Musculoskeletal:  Positive for arthralgias, back pain, myalgias, neck pain and neck stiffness. Negative for gait problem and joint swelling.   Skin: Negative.    Hematological: Negative.        Exam   Examination patient is alert awake in mild distress oriented person place and time mood is good neck shows some stiffness and some pericervical tenderness no scapular winging markedly positive impingement on the left shoulder but no scapular winging good cuff strength in internal and external rotation but markedly positive impingement sign mild biceps and joint line tenderness good pulses good sensation distally good elbow wrist and hand range of motion with good motor strength good sensation no skin changes  Assessment   Encounter Diagnoses   Name Primary?    Left shoulder pain, unspecified chronicity     Chronic left shoulder pain      Left shoulder impingement syndrome  Plan Patient ID: Chris Rivas is a 54 y.o. male.    L Inj/Asp: L subacromial bursa on 9/4/2024 1:10 PM  Indications: pain  Details: ultrasound-guided  Medications: 3 mL lidocaine 10 mg/mL (1 %); 40 mg triamcinolone acetonide 40 mg/mL  Outcome: tolerated well, no immediate complications  Procedure, treatment alternatives, risks and benefits  explained, specific risks discussed. Consent was given by the patient. Immediately prior to procedure a time out was called to verify the correct patient, procedure, equipment, support staff and site/side marked as required. Patient was prepped and draped in the usual sterile fashion.         At this point has had chronic pain in the shoulder and will do 1 therapeutic and diagnostic injection into the left subacromial space hopefully treat his bursitis and impingement and delineate how much of his symptoms are coming from the cervical region versus the shoulder we talked about the risk benefits alternatives and we will follow-up in 4 weeks and he can do activity as tolerated

## 2024-09-20 ENCOUNTER — OFFICE VISIT (OUTPATIENT)
Dept: PAIN MEDICINE | Facility: CLINIC | Age: 54
End: 2024-09-20
Payer: COMMERCIAL

## 2024-09-20 VITALS
SYSTOLIC BLOOD PRESSURE: 127 MMHG | BODY MASS INDEX: 24.76 KG/M2 | RESPIRATION RATE: 18 BRPM | HEART RATE: 61 BPM | WEIGHT: 214 LBS | DIASTOLIC BLOOD PRESSURE: 84 MMHG | HEIGHT: 78 IN

## 2024-09-20 DIAGNOSIS — M79.18 MYOFASCIAL PAIN: ICD-10-CM

## 2024-09-20 DIAGNOSIS — M47.812 ARTHROPATHY OF CERVICAL FACET JOINT: ICD-10-CM

## 2024-09-20 DIAGNOSIS — M75.52 SUBACROMIAL BURSITIS OF LEFT SHOULDER JOINT: Primary | ICD-10-CM

## 2024-09-20 DIAGNOSIS — M54.12 CERVICAL RADICULOPATHY: ICD-10-CM

## 2024-09-20 PROCEDURE — 20611 DRAIN/INJ JOINT/BURSA W/US: CPT | Performed by: STUDENT IN AN ORGANIZED HEALTH CARE EDUCATION/TRAINING PROGRAM

## 2024-09-20 PROCEDURE — 99214 OFFICE O/P EST MOD 30 MIN: CPT | Performed by: STUDENT IN AN ORGANIZED HEALTH CARE EDUCATION/TRAINING PROGRAM

## 2024-09-20 PROCEDURE — 2500000004 HC RX 250 GENERAL PHARMACY W/ HCPCS (ALT 636 FOR OP/ED): Performed by: STUDENT IN AN ORGANIZED HEALTH CARE EDUCATION/TRAINING PROGRAM

## 2024-09-20 RX ORDER — ROPIVACAINE HYDROCHLORIDE 2 MG/ML
5 INJECTION, SOLUTION EPIDURAL; INFILTRATION; PERINEURAL ONCE
Status: COMPLETED | OUTPATIENT
Start: 2024-09-20 | End: 2024-09-20

## 2024-09-20 RX ORDER — TRIAMCINOLONE ACETONIDE 40 MG/ML
40 INJECTION, SUSPENSION INTRA-ARTICULAR; INTRAMUSCULAR ONCE
Status: COMPLETED | OUTPATIENT
Start: 2024-09-20 | End: 2024-09-20

## 2024-09-20 ASSESSMENT — PAIN - FUNCTIONAL ASSESSMENT: PAIN_FUNCTIONAL_ASSESSMENT: 0-10

## 2024-09-20 ASSESSMENT — ENCOUNTER SYMPTOMS
DEPRESSION: 0
LOSS OF SENSATION IN FEET: 1
OCCASIONAL FEELINGS OF UNSTEADINESS: 1

## 2024-09-20 ASSESSMENT — PAIN SCALES - GENERAL
PAINLEVEL_OUTOF10: 6
PAINLEVEL: 6

## 2024-09-20 ASSESSMENT — PATIENT HEALTH QUESTIONNAIRE - PHQ9
2. FEELING DOWN, DEPRESSED OR HOPELESS: NOT AT ALL
1. LITTLE INTEREST OR PLEASURE IN DOING THINGS: NOT AT ALL
SUM OF ALL RESPONSES TO PHQ9 QUESTIONS 1 AND 2: 0

## 2024-09-20 NOTE — PROGRESS NOTES
[facility]  Follow Up Office Visit Note 2024    Patient Information: Chris Rivas, MRN: [id number], : 1970   Primary Care/Referring Physician: Beka Gomez DO, [address] [facility] [address] / [facility] [id number]     Chief Complaint: Low back and leg pain  Interval History: He returns today for a left subacromial bursa injection    Today he reports doing worse since last seen. He did not do PT, stating it has always made his pain worse in the past. He would like to proceed with left subacromial bursa injection.      He states that this past Wednesday at work his entire left hand went numb, and then later that day his entire right leg went numb. The next morning he woke up and both of his feet were numb, and his left arm from the elbow to the arm was numb. He denies any facial droop or slurring of his words. He denies any weakness in his extremities. He reportedly underwent workup for a stroke 10 years ago for left sided weakness which was reportedly unremarkable.     Brief History of Pain: Mr. Chris Rivas is a 54 y.o. male with a PMHx of HLD, dysphagia, anxiety who presents for evaluation of low back, neck, and shoulder pain. He states his low back is the most signficant area of pain thus will focus on that today    He reports low back pain for 25 years that has been progressively worsening. He states he 'fractured' vertebrae in his low back many years ago but did not require surgery. He describes the pain as a midline stabbing, throbbing, and grinding. His low back pain worsens with walking, twisting. The pain occasionally radiates down his legs, right moreso than the left. His entire right foot is numb, and he occasionally gets numbness further up the leg. His left foot also occasionally gets numb. His Gabapentin dose was increased recently with some improvement in his foot pain but no improvement in his low back pain. He states Diclofenac is working better than Meloxicam but he is still  "having quite a lot of pain    Regarding his neck and left shoulder, he states he 'broke his neck' in 2004 after diving into a pool and was \"paralyzed from the chest down\" but did not require surgery and recovered. He has a lot of left shoulder pain, and occasionally right shoulder pain, when trying to lift his arms above his head.    Current Pain Medications: Diclofenac 75 mg BID PRN, Gabapentin 600 mg TID, Methocarbamol 500 mg TID  Previously Tried Pain Medications: Meloxicam, PO steroids, Duloxetine - caused diarrhea    Relevant Surgeries: Denies neck or low back surgery. Denies shoulder injections  Injections: L4/5 HAYLEY - no pain relief, Bilateral L4/5, L5/S1 RFA - 80% pain relief     Physical/Occupational Therapy: No recent PT for his low back    Medications:   Current Outpatient Medications   Medication Instructions    albuterol 90 mcg/actuation inhaler 2 puffs, inhalation, Every 4 hours PRN    cyanocobalamin (Vitamin B-12) 1,000 mcg tablet 1 tablet, oral, Every 24 hours    diclofenac (VOLTAREN) 75 mg, oral, 2 times daily PRN    gabapentin (NEURONTIN) 600 mg, oral, 3 times daily    LORazepam (ATIVAN) 0.5 mg, oral, 2 times daily PRN    methocarbamol (ROBAXIN) 500 mg, oral, 3 times daily    PARoxetine (PAXIL) 10 mg, oral, Nightly      Allergies:   Allergies   Allergen Reactions    Lavender Anaphylaxis    Oxycodone-Acetaminophen Nausea/vomiting       Past Medical & Surgical History:  Past Medical History:   Diagnosis Date    Anxiety     Arthritis     Chronic pain disorder     Headache     Headache, tension-type     Hyperlipidemia     Lower back pain     Lumbar spondylosis     Neck pain     Panic attacks       Past Surgical History:   Procedure Laterality Date    APPENDECTOMY  08/08/2014    Appendectomy    BACK SURGERY  12/16/23    JOINT REPLACEMENT Left     ELBOW    JOINT REPLACEMENT Right     ANKLE    LUNG SURGERY  10/01/2014    Lung Surgery       Family History   Problem Relation Name Age of Onset    Leukemia " "Mother Taylor     Cancer Mother Taylor     Dementia Father       Social History     Socioeconomic History    Marital status: Single     Spouse name: Not on file    Number of children: Not on file    Years of education: Not on file    Highest education level: Not on file   Occupational History    Not on file   Tobacco Use    Smoking status: Every Day     Current packs/day: 0.50     Average packs/day: 0.5 packs/day for 5.1 years (2.6 ttl pk-yrs)     Types: Cigarettes     Start date: 1/1/2020    Smokeless tobacco: Never   Vaping Use    Vaping status: Never Used   Substance and Sexual Activity    Alcohol use: Not Currently    Drug use: Never    Sexual activity: Yes     Partners: Female     Birth control/protection: None   Other Topics Concern    Not on file   Social History Narrative    Not on file     Social Determinants of Health     Financial Resource Strain: Not on file   Food Insecurity: Not on file   Transportation Needs: Not on file   Physical Activity: Not on file   Stress: Not on file   Social Connections: Not on file   Intimate Partner Violence: Not on file   Housing Stability: Not on file       Problems, Past medical history, past surgical history, Medications, allergies, social and family history reviewed and as per the electronic medical record from today's encounter    Review of Systems:  CONST: No fever, chills, fatigue, weight changes  EYES: No loss of vision  ENT: No hearing loss, tinnitus  CV: No chest pain, palpitations  RESP: No dyspnea, shortness of breath, cough  GI: No stool incontinence, nausea, vomiting  : No urinary incontinence  MSK: No joint swelling  SKIN: No rash, no hives  NEURO: No headache, dizziness, weakness  PSYCH: Reports anxiety, depression  HEM/LYMPH: No easy bruising or bleeding  All other systems reviewed are negative     Physical Exam:  Vitals: /84   Pulse 61   Resp 18   Ht 2.007 m (6' 7\")   Wt 97.1 kg (214 lb)   BMI 24.11 kg/m²   General: No apparent distress. Alert, " "appropriate, oriented x 3. Mood generally positive, affect congruent. Speaking in full sentences.   HENT: Normocephalic, atraumatic. Hearing intact.  Eyes: Pupils equal and round  Neck: Supple, trachea midline  Lungs: Symmetric respiratory excursion on visual exam, nonlabored breathing.   Extremities: No cyanosis or edema noted in extremities.  Skin: No rashes, lesions noted.  Neuro: Alert and appropriate. Gait within normal limits. Bulk and tone within normal limits.    Laboratory Data:  The following laboratory data were reviewed during this visit:   Lab Results   Component Value Date    WBC 6.2 08/23/2024    RBC 3.74 (L) 08/23/2024    HGB 11.5 (L) 08/23/2024    HCT 36.7 (L) 08/23/2024     08/23/2024      No results found for: \"INR\"  Lab Results   Component Value Date    CREATININE 0.98 08/23/2024       Imaging:  The following imaging impressions were reviewed by me during this visit:    -7/20/23 lumbar spine MRI shows L2-L3 left foraminal disc protrusion and left lateral disc protrusion with facet hypertrophy, moderate left-sided neural foraminal narrowing. L4-L5 diffuse disc bulging, facet hypertrophy, moderate to severe canal stenosis with moderate left-sided neural foraminal narrowing. L5-S1 diffuse disc bulging with superimposed left foraminal disc protrusion and facet hypertrophy, severe left-sided neural foraminal narrowing and mild-to-moderate right-sided neural foraminal narrowing  -8/23/23 left shoulder xray shows no acute fracture or glenohumeral dislocation. No acromioclavicular seperation. Mild acromioclavicular degenerative changes.    I also personally reviewed the images from the above studies myself. These images and my interpretation of them contributed to the management and decision making of the patient's medical plan.    ASSESSMENT:  Mr. Chris Rivas is a 54 y.o. male with low back and leg pain that is consistent with:    1. Subacromial bursitis of left shoulder joint    2. Cervical " radiculopathy    3. Arthropathy of cervical facet joint    4. Myofascial pain          PLAN:  Radiology: -I reviewed his recent cervical spine x-ray which does show mild degenerative changes.  Given high suspicion for cervical radiculopathy I do recommend a cervical spine MRI for diagnostic and interventional planning purposes    Physically: Referral to physical therapy provided previously for cervical radiculopathy and likely left shoulder impingement. He still has not started this and remains hesitant to do so due to worsening pain from PT in the past    Psychologically: I suspect there is some component of anxiety causing pain amplification. Continue following with your PCP and would consider referral to a behavioral health provider in the future    Medication: -Continue Gabapentin 600 mg TID and Diclofenac 75 mg BID    Duration: Multiple years    Intervention: I suspect his low back pain is multifactorial in the setting of lumbar spinal stenosis, lumbar facet arthropathy, lumbar radiculopathy, myofascial pain. His MRI does show fairly advanced central and foraminal stenosis which likely accounts for some of the low back and leg pain he is experiencing. He underwent an HAYLEY at L4/5 with no improvement. He underwent bilateral lumbar medial branch nerve RFA at L4/5, L5/S1 with 80% improvement in his pain  -Given the severity of the degenerative changes noted, as well as evidence of numbness in his lower extremities, he may require decompressive surgery in the future. It seems he would like to see Dr. Almazan if he decides to move forward with this  - Regarding his neck, left arm, and left shoulder pain I suspect this is multifactorial secondary to cervical radiculopathy, left shoulder impingement, and possible cervical facet arthropathy.  Currently his left shoulder is the most severe area of pain. He underwent an ultrasound-guided left subacromial bursa injection today, as noted below       Performed by: Juan COYNE  MD Mi  Authorized by: Juan Stark MD      Comments: Ultrasound guidance utilized today for his subacromial bursa injection  Joint Injection/Aspiration    Date/Time: 9/20/2024 1:30 PM    Performed by: Juan Stark MD  Authorized by: Juan Stark MD    Consent:     Consent obtained:  Written    Consent given by:  Patient    Risks, benefits, and alternatives were discussed: yes      Risks discussed:  Bleeding, infection and pain    Alternatives discussed:  Alternative treatment  Universal protocol:     Procedure explained and questions answered to patient or proxy's satisfaction: yes      Relevant documents present and verified: yes      Imaging studies available: yes      Site/side marked: yes      Immediately prior to procedure, a time out was called: yes      Patient identity confirmed:  Verbally with patient  Location:     Location:  Shoulder    Shoulder:  L subacromial bursa  Anesthesia:     Anesthesia method:  Local infiltration    Local anesthetic: ropivacaine 0.2%  Procedure details:     Preparation: Patient was prepped and draped in usual sterile fashion      Needle gauge:  22 G    Ultrasound guidance: yes      Approach:  Lateral    Steroid injected: yes    Post-procedure details:     Dressing:  Adhesive bandage    Procedure completion:  Tolerated well, no immediate complications  Comments:      Ultrasound guided left subacromial bursa injection  After informed written consent was obtained, the patient was placed in the sitting position with the left arm resting in the lap. The correct site and laterality were confirmed with the patient and marked. The skin was prepped with chlorhexidine, allowed to dry for a minimum of 3 minutes, and draped in a sterile fashion.    A linear ultrasound probe (10-12mHz) was positioned over the left shoulder. The supraspinatus tendon and the subacromial space and bursa were located. Next, a 25 gauge 1.5 inch needle was used to anesthesize the skin with  2 mL of ropivacaine 0.2%. Next a 22 gauge 1.5 inch needle was advanced slowly until its tip was visualized under ultrasound to be in the left subacromial/subdeltoid bursal space. Next, a mixture of 40 mg triamcinolone diluted in 3 mL of ropivacaine% was injected and the needle was removed.   The skin was cleansed and a sterile bandage was applied. The patient tolerated the procedure well and no complications were encountered.            Sincerely,  Juan Stark MD  [facility]

## 2024-10-04 ENCOUNTER — HOSPITAL ENCOUNTER (OUTPATIENT)
Dept: RADIOLOGY | Facility: HOSPITAL | Age: 54
Discharge: HOME | End: 2024-10-04
Payer: COMMERCIAL

## 2024-10-04 DIAGNOSIS — M54.12 CERVICAL RADICULOPATHY: ICD-10-CM

## 2024-10-04 PROCEDURE — 72141 MRI NECK SPINE W/O DYE: CPT

## 2024-10-15 ENCOUNTER — APPOINTMENT (OUTPATIENT)
Dept: ORTHOPEDIC SURGERY | Facility: CLINIC | Age: 54
End: 2024-10-15
Payer: COMMERCIAL

## 2024-10-25 ENCOUNTER — OFFICE VISIT (OUTPATIENT)
Dept: PAIN MEDICINE | Facility: CLINIC | Age: 54
End: 2024-10-25
Payer: COMMERCIAL

## 2024-10-25 VITALS
WEIGHT: 214 LBS | RESPIRATION RATE: 18 BRPM | HEIGHT: 78 IN | BODY MASS INDEX: 24.76 KG/M2 | HEART RATE: 64 BPM | SYSTOLIC BLOOD PRESSURE: 114 MMHG | OXYGEN SATURATION: 98 % | DIASTOLIC BLOOD PRESSURE: 72 MMHG

## 2024-10-25 DIAGNOSIS — M47.812 ARTHROPATHY OF CERVICAL FACET JOINT: ICD-10-CM

## 2024-10-25 DIAGNOSIS — M79.18 MYOFASCIAL PAIN: ICD-10-CM

## 2024-10-25 DIAGNOSIS — M54.12 CERVICAL RADICULOPATHY: Primary | ICD-10-CM

## 2024-10-25 PROCEDURE — G2211 COMPLEX E/M VISIT ADD ON: HCPCS | Performed by: STUDENT IN AN ORGANIZED HEALTH CARE EDUCATION/TRAINING PROGRAM

## 2024-10-25 PROCEDURE — 3008F BODY MASS INDEX DOCD: CPT | Performed by: STUDENT IN AN ORGANIZED HEALTH CARE EDUCATION/TRAINING PROGRAM

## 2024-10-25 PROCEDURE — 99214 OFFICE O/P EST MOD 30 MIN: CPT | Performed by: STUDENT IN AN ORGANIZED HEALTH CARE EDUCATION/TRAINING PROGRAM

## 2024-10-25 RX ORDER — LIDOCAINE, MENTHOL 4; 1 G/100G; G/100G
PATCH TOPICAL
COMMUNITY

## 2024-10-25 ASSESSMENT — PAIN SCALES - GENERAL
PAINLEVEL_OUTOF10: 8
PAINLEVEL_OUTOF10: 8

## 2024-10-25 ASSESSMENT — PAIN - FUNCTIONAL ASSESSMENT: PAIN_FUNCTIONAL_ASSESSMENT: 0-10

## 2024-10-25 ASSESSMENT — PAIN DESCRIPTION - DESCRIPTORS: DESCRIPTORS: ACHING;STABBING

## 2024-11-11 ENCOUNTER — TELEPHONE (OUTPATIENT)
Dept: PRIMARY CARE | Facility: CLINIC | Age: 54
End: 2024-11-11
Payer: COMMERCIAL

## 2024-11-11 DIAGNOSIS — M48.061 DEGENERATIVE LUMBAR SPINAL STENOSIS: ICD-10-CM

## 2024-11-11 RX ORDER — DICLOFENAC SODIUM 75 MG/1
75 TABLET, DELAYED RELEASE ORAL 2 TIMES DAILY PRN
Qty: 180 TABLET | Refills: 0 | Status: SHIPPED | OUTPATIENT
Start: 2024-11-11

## 2024-11-11 NOTE — TELEPHONE ENCOUNTER
Pt is requesting a referral to a neurologist, Dr Stark is suggesting it. Please advise and call back [phone].

## 2024-11-13 NOTE — TELEPHONE ENCOUNTER
Beka Gomez, DO  [employer] (7:06 AM)     Need reasoning for referrals.   LDMVOM to cb with more info, pt saw Dr. Stark on 10/25/24.

## 2024-11-14 NOTE — TELEPHONE ENCOUNTER
Pt is calling back with info, He says he has a pinched nerve in his neck and shoulders, he has intermittent Pain L arm and leg, hand, and Middle finger, sometimes all fingers, Also intermittent twitching and jerking, seizure like movements. Please advise and call back [phone].

## 2024-11-15 NOTE — TELEPHONE ENCOUNTER
I reviewed Dr. Stark note - I do not see mention of seeing neurology.  I do see recommend opinion from Dr. Almazan - orthopedic spine.  See if patient is ok seeing Dr. Almazna or Dr. Cardoza orthopedic spine for further evaluation/treatment.      [facility] to call back and advise us.

## 2024-11-15 NOTE — TELEPHONE ENCOUNTER
Chris is calling back stating Dr. Dr. Stark advised him to see a neurologist because of his symtoms of twitching and muscle spasms. Chris is agreeing to see Dr. Almazan- orthopedic spine for further evaluation/treatment. Chris is asking for a phone call when the referral is placed with the scheduling number to be left on his VM. Chris # [phone]

## 2024-11-18 NOTE — TELEPHONE ENCOUNTER
Left detailed message on VM with referrals and scheduling number. Advised to call back with any questions. PL

## 2024-12-04 ENCOUNTER — OFFICE VISIT (OUTPATIENT)
Dept: ORTHOPEDIC SURGERY | Facility: CLINIC | Age: 54
End: 2024-12-04
Payer: COMMERCIAL

## 2024-12-04 DIAGNOSIS — M47.812 CERVICAL SPONDYLOSIS WITHOUT MYELOPATHY: ICD-10-CM

## 2024-12-04 DIAGNOSIS — M48.062 LUMBAR STENOSIS WITH NEUROGENIC CLAUDICATION: Primary | ICD-10-CM

## 2024-12-04 DIAGNOSIS — M54.12 CERVICAL RADICULOPATHY: ICD-10-CM

## 2024-12-04 DIAGNOSIS — M47.816 LUMBAR SPONDYLOSIS: ICD-10-CM

## 2024-12-04 PROBLEM — M54.16 LUMBAR RADICULOPATHY: Status: ACTIVE | Noted: 2024-12-04

## 2024-12-04 PROCEDURE — 99215 OFFICE O/P EST HI 40 MIN: CPT | Performed by: STUDENT IN AN ORGANIZED HEALTH CARE EDUCATION/TRAINING PROGRAM

## 2024-12-04 PROCEDURE — 99205 OFFICE O/P NEW HI 60 MIN: CPT | Performed by: STUDENT IN AN ORGANIZED HEALTH CARE EDUCATION/TRAINING PROGRAM

## 2024-12-04 RX ORDER — ACETAMINOPHEN 325 MG/1
975 TABLET ORAL ONCE
OUTPATIENT
Start: 2024-12-04 | End: 2024-12-04

## 2024-12-04 RX ORDER — CEFAZOLIN SODIUM 2 G/100ML
2 INJECTION, SOLUTION INTRAVENOUS ONCE
OUTPATIENT
Start: 2024-12-04 | End: 2024-12-04

## 2024-12-04 RX ORDER — GABAPENTIN 300 MG/1
600 CAPSULE ORAL ONCE
OUTPATIENT
Start: 2024-12-04 | End: 2024-12-04

## 2024-12-04 ASSESSMENT — PAIN SCALES - GENERAL: PAINLEVEL_OUTOF10: 8

## 2024-12-04 ASSESSMENT — PAIN - FUNCTIONAL ASSESSMENT: PAIN_FUNCTIONAL_ASSESSMENT: 0-10

## 2024-12-04 NOTE — PROGRESS NOTES
Orthopaedic Spine Surgery Clinic Note    Patient ID: Chris Rivas is a 54 y.o. male.    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  Patient has been working with Dr. Stark for these issues.    He states his low back has been hurting him for the better part of 20 years and has been progressively worsening.  He describes pain in his lower back that throbs and stabs.  This then radiates into bilateral hips and then down his legs more so on the left-hand side today.  He currently does not have any numbness or paresthesias in his lower extremity, although he did have this previously.  He has previously taken gabapentin, meloxicam, diclofenac.  He has not had any recent physical therapy for his low back (however it does seem he did go through this in the remote past), however he has had a number of injections to his lower back including diagnostic medial branch nerve blocks and RFA at L4-5, L5-S1 on 11/6/2023, 11/20/2023 with reported 80% improvement around that time.  He did previously have an L4-5 HAYLEY which did not provide much improvement.    With regards to his neck, he reports chronic issues with neck and primarily left upper extremity pain over the last couple years.  However this has been more severe over the last 4 months.  He notes pain around the neck that then proceeds into the left shoulder and then down his left arm.  He has not had an HAYLEY of his cervical spine, although he did have a left shoulder subacromial injection which did not provide much relief.  He previously was having some numbness and paresthesias in this distribution as well.  He recently did get his  "hand pinched by some machinery at work which causes some paresthesias in the radial 3 digits which confounds the picture.  He otherwise denies upper extremity dexterity issues, denies bowel or bladder control issues.  He does endorse episodic seizure-like jerks with his upper extremities that coincide with his symptoms.     Of note he did previously have a left elbow reconstruction performed in his youth after an injury.  He also reportedly sustained a diving board injury approximately 20 years ago when he \"broke his neck\" and was temporarily paralyzed for 2 to 3 days.  He has since recovered from this.    Prior treatments:  L4-5 HAYLEY (no pain relief), bilateral L4-5 and L5-S1 RFA (80% pain relief), left subacromial bursa injection (no relief)  No recent PT for his low back, although he confirmed he did do this in the remote past  Gabapentin, diclofenac, methocarbamol, lidocaine patches    Relevant PMH/PSH for spine  No prior cervical or lumbar spine surgeries  He does have an intolerance to Percocet, he prefers Norco/Vicodin  Of note, patient is a current every day smoker.  He smokes approximately 10 cigarettes/day, although he is actively trying to cut down.    Past Medical History:   Diagnosis Date    Anxiety     Arthritis     Chronic pain disorder     Headache     Headache, tension-type     Hyperlipidemia     Lower back pain     Lumbar spondylosis     Neck pain     Panic attacks        Past Surgical History:   Procedure Laterality Date    APPENDECTOMY  08/08/2014    Appendectomy    BACK SURGERY  12/16/23    JOINT REPLACEMENT Left     ELBOW    JOINT REPLACEMENT Right     ANKLE    LUNG SURGERY  10/01/2014    Lung Surgery       Social History     Socioeconomic History    Marital status: Single   Tobacco Use    Smoking status: Every Day     Current packs/day: 0.50     Average packs/day: 0.5 packs/day for 5.3 years (2.7 ttl pk-yrs)     Types: Cigarettes     Start date: 1/1/2020    Smokeless tobacco: Never   Vaping Use "    Vaping status: Never Used   Substance and Sexual Activity    Alcohol use: Not Currently    Drug use: Never    Sexual activity: Yes     Partners: Female     Birth control/protection: None       Family History   Problem Relation Name Age of Onset    Leukemia Mother Taylor     Cancer Mother Taylor     Dementia Father         Allergies   Allergen Reactions    Lavender Anaphylaxis    Oxycodone-Acetaminophen Nausea/vomiting       Current Outpatient Medications   Medication Instructions    albuterol 90 mcg/actuation inhaler 2 puffs, inhalation, Every 4 hours PRN    cyanocobalamin (Vitamin B-12) 1,000 mcg tablet 1 tablet, Every 24 hours    diclofenac (VOLTAREN) 75 mg, oral, 2 times daily PRN    gabapentin (NEURONTIN) 600 mg, oral, 3 times daily    lidocaine-menthol 4-1 % adhesive patch,medicated Apply topically.    LORazepam (ATIVAN) 0.5 mg, oral, 2 times daily PRN    methocarbamol (ROBAXIN) 500 mg, oral, 3 times daily    PARoxetine (PAXIL) 10 mg, oral, Nightly         Vitals & Measurements  There were no vitals filed for this visit.     Ortho Exam  General: AO x 3, NAD  Cardio: examined extremities perfused by peripheral palpation  Resp: breathing unlabored  Gait: within normal limits, non-antalgic  Tenderness: Positive tenderness to palpation over the lower lumbar spine paraspinal musculature.  Positive tenderness to palpation over the cervical spine left-sided paraspinal musculature.    Upper Extremity  Right  Left  Deltoid   5/5  5/5  Biceps   5/5  5/5  Triceps   5/5  5/5  Wrist extension  5/5  5/5     5/5  5/5  Intrinsics  5/5  5/5      Lower Extremity  Right  Left  IP   5/5  5/5  Quadriceps  5/5  5/5  Tibialis anterior  5/5  5/5  EHL   5/5  5/5  Gastrocnemius  5/5  5/5    Sensation: Normal to light touch throughout upper and lower extremities bilaterally.    Reflexes:  Right   Left  Bi  2+   2+  Tri  2+   2+  BR  2+  2+  Q  2+  2+  A   2+   2+    Taveras: negative  IBR: negative  Clonus: negative    No significant  left-sided shoulder impingement signs    Diagnostic Results - Imaging    XR cervical spine, 8/23/2024  FINDINGS:  Alignment is within normal limits.  Disc heights are maintained.      Open-mouth view is unremarkable.  Vertebral body heights are preserved. Posterior elements are intact.  Prevertebral soft tissues are unremarkable.      IMPRESSION:  1. Unremarkable cervical spine radiographs.      MRI cervical spine, 10/4/2024  FINDINGS:  Cord: Mild ventral cord flattening/deformation suggested at C6-C7 as  it is draped over a disc protrusion component. Otherwise the cervical  spinal cord appears normal in morphology, caliber, and signal.      Epidural fluid: None.      Alignment: Within normal limits.      Vertebral bodies: Normal.      Marrow signal: Within normal limits.      Intervertebral Discs: Grossly maintained intervertebral disc heights.          Degenerative change:      C1-C2:  Mild arthrosis centered about the dens. No spinal canal  stenosis.      C2-C3:  Minimal right facet arthropathy. No spinal canal stenosis or  substantial neural foraminal narrowing.      C3-C4:  Mild right facet arthropathy with slight right ligamentum  flavum thickening. Mild bilateral uncovertebral spurring. No spinal  canal stenosis. Mild right and no substantial left neural foraminal  narrowing.      C4-C5:  Mild bilateral facet arthropathy. Trace ligamentum flavum  thickening/infolding. Right-greater-than-left uncovertebral spurring  with no significant disc bulge. No spinal canal stenosis.  Mild-to-moderate left and severe right neural foraminal narrowing.      C5-C6:  Moderate right and mild left facet arthropathy. Slight  ligamentum flavum thickening/infolding. Bilateral uncovertebral  spurring with tiny central disc protrusion/extrusion with possible  slight cranial migration. Borderline/mild spinal canal stenosis with  ventral thecal sac indentation. Moderate bilateral neural foraminal  narrowing suggested.      C6-C7:   Mild right facet arthropathy. Ligamentum flavum  thickening/infolding. Mild bilateral uncovertebral spurring. Small  central disc protrusion. Mild spinal canal stenosis with  aforementioned ventral cord flattening/indentation. No significant  neural foraminal narrowing.      C7-T1:  Mild bilateral facet arthropathy. Tiny central disc  protrusion with fissuring. No spinal canal stenosis. No substantial  neural foraminal narrowing.      Soft tissues: The prevertebral and posterior paraspinous soft tissues  are within normal limits.      IMPRESSION:  Mild interval worsening of cervical degenerative change as compared  with remote MRI examination. There is mild spinal canal stenosis at  C6-C7 secondary to residual small central disc protrusion. Severe  right neural foraminal narrowing at C4-C5 secondary to uncovertebral  spurring and disc osteophyte components. Moderate bilateral neural  foraminal narrowing at C5-C6.      XR lumbar spine, 6/23/2023  FINDINGS:There is preservation of the normal lumbar lordosis. No fracture or  spondylolisthesis is identified. No pars defects are identified.     The vertebral body heights and disk spaces are well maintained. Minimal  endplate osteophytosis and facet arthropathy is seen.     The visualized SI joints are grossly intact.     IMPRESSION:  No acute fracture or spondylolisthesis.         MRI lumbar spine, 7/20/2023  FINDINGS:  RESULT:  Counting reference: Lumbosacral junction.     Alignment: Alignment is anatomic.     Bone marrow signal/fracture: No evidence of pathologic marrow infiltration.  No evidence of prior fracture.     Conus: The conus is within normal limits of signal intensity and morphology.  The conus terminates normally at L1.     Paraspinal soft tissues: Paraspinal soft tissues are unremarkable.     Lower thoracic spine: Visualized lower thoracic canal and foramina are  patent.     L1-L2: Facet hypertrophy is noted. There is mild bilateral neural  foraminal  narrowing     L2-L3: Left foraminal disc protrusion and left lateral disc protrusion with  facet hypertrophy is noted. There is moderate left-sided neural foraminal  narrowing. There is mild right-sided neural foraminal narrowing     L3-L4: Diffuse disc bulging with facet hypertrophy is noted. Canal and  foramina appear patent     L4-L5: There is diffuse disc bulging with posterior annular tearing  centrally. There is facet hypertrophy. There is moderate to severe canal  stenosis with moderate left-sided neural foraminal narrowing and mild  right-sided neural foraminal narrowing     L5-S1: There is diffuse disc bulging with superimposed left foraminal disc  protrusion and facet hypertrophy. There is severe left-sided neural  foraminal narrowing and mild-to-moderate right-sided neural foraminal  narrowing     Sacrum and iliac wings: The visualized sacrum and iliac wings are within  normal limits.  The presacral soft tissues are normal in appearance.     IMPRESSION:  Lumbar spondylosis as described    Diagnosis  Encounter Diagnoses   Name Primary?    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  To begin with his neck, patient's neck issues have been longstanding for the last couple years.  His symptoms have been more severe over the last 4 months.  He describes pain proceeding down primarily the left side of his neck into his left upper extremity.  This is associated with numbness and paresthesias.  Patient did have a subacromial injection performed for the left shoulder which did not provide much relief.  MRI demonstrates more so right sided  neuroforaminal stenosis at C4-5 and left greater than right neuroforaminal stenosis at C5-6.  To that end, I agree with Dr. Stark that there is likely some component of his left-sided neuroforaminal stenosis at this level that is contributing to his symptoms.  He has not had any recent conservative management for this in the form of physical therapy or injections.  I agree with Dr. Stark that it would be worth initiating some conservative management.  I placed a referral to physical therapy for him to initiate some light neck and upper extremity stretching and strengthening. He is also seeing neurology regarding these jerky, seizure-like movements with the UEs. It may be some manifestation of muscle spasm from his radiculopathy, but I agree with having this evaluated. He has an appointment with neurology on 2/12/25. He will follow-up with Dr. Stark with regards to the cervical spine.  If his symptoms fail to improve or worsen, he can consider an HAYLEY with their office.    With regards to his lumbar spine, patient has been also dealing with longstanding issues for several years.  He has exhausted nonsurgical management in the form of exercises, medications, and injections.  Over the last year, he has had bilateral L4-5 and L5-S1 facet blocks/RFA's in addition to an L4-5 HAYLEY.  He is still quite miserable with regards to his low back and his lower extremity symptoms.  On MRI imaging from July 2023, he demonstrates moderate to severe stenosis at L4-5 with a left-sided paracentral disc bulge at L5-S1 that is effacing the left S1 nerve root.  His most recent XR imaging demonstrates no spondylolisthesis.  We discussed continued nonsurgical management in the form of therapy/injections versus surgical management.  We discussed how a surgical endeavor would likely entail a decompression, in the form of a laminectomy at L4-5 and a left-sided hemilaminotomy at L5-S1.  We discussed the risks, benefits, expected outcomes,  and personnel involved with such an endeavor.  We specifically discussed the risks of infection, bleeding, CSF leak, and postoperative weakness/numbness/paresthesias.  Patient is a current everyday smoker.  He will attempt to quit in the near future so as to optimize his perioperative risks specifically with infection and wound healing.  I encouraged this.  However he understood that proceeding with a decompressive surgery despite his smoking would place him at an elevated perioperative risk of wound healing and infection.  He understood this.  We also discussed how the expected outcome from the surgery would be primarily addressing his lower extremity symptoms and that this surgery may not address any axial low back pain that he is experiencing as part of his condition.    Given that his most recent MRI was from July 2023, we placed an order for an expedited MRI of the lumbar spine to evaluate the current status.  We also tentatively placed a surgical date for 1/16/2025 so we may begin the planning process with regards to preoperative lab testing and clearance.  Patient will continue to work on smoking cessation in the meantime to optimize his perioperative risk.  From a work standpoint, he is able to take a more supervisory role and can perform light duty activities during his early recovery.  We will work with him on this as we move forward.  All of his questions were answered to his satisfaction.  He was in agreement with the above plan.    F/u surgery 1/16/25.     Orders Placed This Encounter    Request for Pre-Admission Testing Visit    Staphylococcus aureus/MRSA colonization, Culture    MR lumbar spine wo IV contrast    Basic Metabolic Panel    CBC and Auto Differential    Coagulation Screen    Type And Screen    Nicotine and Metabolites,S    Referral to Physical Therapy    ECG 12 lead    Case Request Operating Room: L4-5 laminectomy, L5-S1 left hemilaminectomy       --    Crow Wilkins MD  Orthopaedic  Spine Surgery  , Department of Orthopaedic Surgery  [facility]

## 2024-12-06 ENCOUNTER — HOSPITAL ENCOUNTER (OUTPATIENT)
Dept: RADIOLOGY | Facility: HOSPITAL | Age: 54
Discharge: HOME | End: 2024-12-06
Payer: COMMERCIAL

## 2024-12-06 DIAGNOSIS — M47.816 LUMBAR SPONDYLOSIS: ICD-10-CM

## 2024-12-06 PROCEDURE — 72148 MRI LUMBAR SPINE W/O DYE: CPT

## 2024-12-11 ENCOUNTER — TELEPHONE (OUTPATIENT)
Dept: ORTHOPEDICS | Facility: HOSPITAL | Age: 54
End: 2024-12-11
Payer: COMMERCIAL

## 2024-12-11 NOTE — TELEPHONE ENCOUNTER
I called Mr. Rivas this morning to discuss his MRI result.  He initially did not  the phone, so I left a voicemail for him to call me back.  He did call me back and we discussed further.  We discussed how his MRI demonstrates similar changes to his prior MRI from more than a year ago.  This demonstrates moderate spinal canal stenosis at L4-5 with moderate to severe left greater than right lateral recess and neuroforaminal stenosis.  He also demonstrates severe left neuroforaminal stenosis at L5-S1.    He did confirm with me that his symptoms have remained the same.  He is experiencing pain in his low back that radiates down bilateral lower extremities with the left lower extremity being the worst.  There is a claudicating component to this especially when he is up and ambulating.  He continues to try to cut down on smoking, stating that he is definitely smoking fewer cigarettes now.  I recommended he continue this as best as he can to optimize his perioperative risk of infection and wound healing.    We had another brief discussion about the nature of the surgery that we are planning for him, tentatively scheduled for 1/16/2025.  This would be a laminectomy decompression at L4-5 with a left-sided L5-S1 hemilaminotomy and foraminotomies.  We briefly discussed the risks, benefits, expected outcomes and personnel involved with this type of endeavor.  He would like to move forward.    F/u surgery 1/16/25.     --    Crow Wilkins MD  Orthopaedic Spine Surgery  , Department of Orthopaedic Surgery  [facility]

## 2025-01-08 ENCOUNTER — TELEPHONE (OUTPATIENT)
Dept: ORTHOPEDIC SURGERY | Facility: CLINIC | Age: 55
End: 2025-01-08

## 2025-01-08 ENCOUNTER — APPOINTMENT (OUTPATIENT)
Dept: PREADMISSION TESTING | Facility: HOSPITAL | Age: 55
End: 2025-01-08
Payer: COMMERCIAL

## 2025-01-08 ENCOUNTER — HOSPITAL ENCOUNTER (OUTPATIENT)
Dept: RADIOLOGY | Facility: HOSPITAL | Age: 55
Discharge: HOME | End: 2025-01-08
Payer: COMMERCIAL

## 2025-01-08 VITALS
BODY MASS INDEX: 24.53 KG/M2 | HEIGHT: 78 IN | HEART RATE: 79 BPM | WEIGHT: 212 LBS | RESPIRATION RATE: 16 BRPM | SYSTOLIC BLOOD PRESSURE: 114 MMHG | DIASTOLIC BLOOD PRESSURE: 71 MMHG | TEMPERATURE: 98.1 F | OXYGEN SATURATION: 98 %

## 2025-01-08 DIAGNOSIS — M48.062 LUMBAR STENOSIS WITH NEUROGENIC CLAUDICATION: ICD-10-CM

## 2025-01-08 DIAGNOSIS — M47.816 LUMBAR SPONDYLOSIS: ICD-10-CM

## 2025-01-08 DIAGNOSIS — Z01.818 PRE-OP TESTING: ICD-10-CM

## 2025-01-08 DIAGNOSIS — Z01.818 PRE-OP TESTING: Primary | ICD-10-CM

## 2025-01-08 LAB
ABO GROUP (TYPE) IN BLOOD: NORMAL
ANION GAP SERPL CALCULATED.3IONS-SCNC: 10 MMOL/L (ref 10–20)
ANTIBODY SCREEN: NORMAL
APTT PPP: 25.9 SECONDS (ref 22–32.5)
BASOPHILS # BLD AUTO: 0.06 X10*3/UL (ref 0–0.1)
BASOPHILS NFR BLD AUTO: 0.5 %
BUN SERPL-MCNC: 23 MG/DL (ref 6–23)
CALCIUM SERPL-MCNC: 9.8 MG/DL (ref 8.6–10.3)
CHLORIDE SERPL-SCNC: 103 MMOL/L (ref 98–107)
CO2 SERPL-SCNC: 30 MMOL/L (ref 21–32)
CREAT SERPL-MCNC: 0.83 MG/DL (ref 0.5–1.3)
EGFRCR SERPLBLD CKD-EPI 2021: >90 ML/MIN/1.73M*2
EOSINOPHIL # BLD AUTO: 0.28 X10*3/UL (ref 0–0.7)
EOSINOPHIL NFR BLD AUTO: 2.3 %
ERYTHROCYTE [DISTWIDTH] IN BLOOD BY AUTOMATED COUNT: 13.2 % (ref 11.5–14.5)
FLUAV RNA RESP QL NAA+PROBE: NOT DETECTED
FLUBV RNA RESP QL NAA+PROBE: NOT DETECTED
GLUCOSE SERPL-MCNC: 94 MG/DL (ref 74–99)
HCT VFR BLD AUTO: 32.9 % (ref 41–52)
HGB BLD-MCNC: 10.4 G/DL (ref 13.5–17.5)
IMM GRANULOCYTES # BLD AUTO: 0.07 X10*3/UL (ref 0–0.7)
IMM GRANULOCYTES NFR BLD AUTO: 0.6 % (ref 0–0.9)
INR PPP: 1.1 (ref 0.9–1.2)
LYMPHOCYTES # BLD AUTO: 1.44 X10*3/UL (ref 1.2–4.8)
LYMPHOCYTES NFR BLD AUTO: 12.1 %
MCH RBC QN AUTO: 30 PG (ref 26–34)
MCHC RBC AUTO-ENTMCNC: 31.6 G/DL (ref 32–36)
MCV RBC AUTO: 95 FL (ref 80–100)
MONOCYTES # BLD AUTO: 0.74 X10*3/UL (ref 0.1–1)
MONOCYTES NFR BLD AUTO: 6.2 %
NEUTROPHILS # BLD AUTO: 9.36 X10*3/UL (ref 1.2–7.7)
NEUTROPHILS NFR BLD AUTO: 78.3 %
NRBC BLD-RTO: 0 /100 WBCS (ref 0–0)
PLATELET # BLD AUTO: 300 X10*3/UL (ref 150–450)
POTASSIUM SERPL-SCNC: 4.2 MMOL/L (ref 3.5–5.3)
PROTHROMBIN TIME: 10.9 SECONDS (ref 9.3–12.7)
RBC # BLD AUTO: 3.47 X10*6/UL (ref 4.5–5.9)
RH FACTOR (ANTIGEN D): NORMAL
SARS-COV-2 RNA RESP QL NAA+PROBE: DETECTED
SODIUM SERPL-SCNC: 139 MMOL/L (ref 136–145)
WBC # BLD AUTO: 12 X10*3/UL (ref 4.4–11.3)

## 2025-01-08 PROCEDURE — 93005 ELECTROCARDIOGRAM TRACING: CPT

## 2025-01-08 PROCEDURE — 71046 X-RAY EXAM CHEST 2 VIEWS: CPT

## 2025-01-08 PROCEDURE — 80048 BASIC METABOLIC PNL TOTAL CA: CPT

## 2025-01-08 PROCEDURE — 86901 BLOOD TYPING SEROLOGIC RH(D): CPT

## 2025-01-08 PROCEDURE — 87636 SARSCOV2 & INF A&B AMP PRB: CPT

## 2025-01-08 PROCEDURE — 85025 COMPLETE CBC W/AUTO DIFF WBC: CPT

## 2025-01-08 PROCEDURE — 87081 CULTURE SCREEN ONLY: CPT | Mod: TRILAB

## 2025-01-08 PROCEDURE — 93010 ELECTROCARDIOGRAM REPORT: CPT | Performed by: INTERNAL MEDICINE

## 2025-01-08 PROCEDURE — 85610 PROTHROMBIN TIME: CPT

## 2025-01-08 PROCEDURE — 80323 ALKALOIDS NOS: CPT

## 2025-01-08 PROCEDURE — 99204 OFFICE O/P NEW MOD 45 MIN: CPT | Performed by: PHYSICIAN ASSISTANT

## 2025-01-08 PROCEDURE — 36415 COLL VENOUS BLD VENIPUNCTURE: CPT

## 2025-01-08 PROCEDURE — 71046 X-RAY EXAM CHEST 2 VIEWS: CPT | Performed by: RADIOLOGY

## 2025-01-08 RX ORDER — CHLORHEXIDINE GLUCONATE ORAL RINSE 1.2 MG/ML
SOLUTION DENTAL
Qty: 473 ML | Refills: 0 | Status: SHIPPED | OUTPATIENT
Start: 2025-01-08 | End: 2025-01-16

## 2025-01-08 ASSESSMENT — ENCOUNTER SYMPTOMS
LIGHT-HEADEDNESS: 1
ARTHRALGIAS: 1
BACK PAIN: 1

## 2025-01-08 ASSESSMENT — DUKE ACTIVITY SCORE INDEX (DASI)
CAN YOU DO YARD WORK LIKE RAKING LEAVES, WEEDING OR PUSHING A MOWER: YES
CAN YOU DO MODERATE WORK AROUND THE HOUSE LIKE VACUUMING, SWEEPING FLOORS OR CARRYING GROCERIES: YES
CAN YOU HAVE SEXUAL RELATIONS: YES
DASI METS SCORE: 8
TOTAL_SCORE: 42.7
CAN YOU TAKE CARE OF YOURSELF (EAT, DRESS, BATHE, OR USE TOILET): YES
CAN YOU RUN A SHORT DISTANCE: YES
CAN YOU WALK INDOORS, SUCH AS AROUND YOUR HOUSE: YES
CAN YOU CLIMB A FLIGHT OF STAIRS OR WALK UP A HILL: YES
CAN YOU WALK A BLOCK OR TWO ON LEVEL GROUND: YES
CAN YOU PARTICIPATE IN MODERATE RECREATIONAL ACTIVITIES LIKE GOLF, BOWLING, DANCING, DOUBLES TENNIS OR THROWING A BASEBALL OR FOOTBALL: YES
CAN YOU PARTICIPATE IN STRENOUS SPORTS LIKE SWIMMING, SINGLES TENNIS, FOOTBALL, BASKETBALL, OR SKIING: NO
CAN YOU DO LIGHT WORK AROUND THE HOUSE LIKE DUSTING OR WASHING DISHES: YES
CAN YOU DO HEAVY WORK AROUND THE HOUSE LIKE SCRUBBING FLOORS OR LIFTING AND MOVING HEAVY FURNITURE: NO

## 2025-01-08 NOTE — PREPROCEDURE INSTRUCTIONS
PAT DISCHARGE INSTRUCTIONS    Please call the Same Day Surgery (SDS) Department of the hospital where your procedure will be performed after 2:00 PM the day before your surgery. If you are scheduled on a Monday, or a Tuesday following a Monday holiday, you will need to call on the last business day prior to your surgery.    [facility]  [address], [address]  [phone]    [facility]  [address] [place] [address] [phone]    [facility]  [address]  [place] [id number]  [phone]    Please let your surgeon know if:      You develop any open sores, shingles, burning or painful urination as these may increase your risk of an infection.   You no longer wish to have the surgery.   Any other personal circumstances change that may lead to the need to cancel or defer this surgery-such as being sick or getting admitted to any hospital within one week of your planned procedure.    Your contact details change, such as a change of address or phone number.    Starting now:     Please DO NOT drink alcohol or smoke for 24 hours before surgery. It is well known that quitting smoking can make a huge difference to your health and recovery from surgery. The longer you abstain from smoking, the better your chances of a healthy recovery. If you need help with quitting, call 1-800-QUIT-NOW to be connected to a trained counselor who will discuss the best methods to help you quit.     Before your surgery:    Please stop all supplements 7 days prior to surgery. Or as directed by your surgeon.   Please stop taking NSAID pain medicine such as Advil and Motrin 7 days before surgery.    If you develop any fever, cough, cold, rashes, cuts, scratches, scrapes, urinary symptoms or infection anywhere on your body (including teeth and gums) prior to surgery, please call your surgeon’s office as soon as  possible. This may require treatment to reduce the chance of cancellation on the day of surgery.    The day before your surgery:   Get a good night’s rest.  Use the special soap for bathing if you have been instructed to use one.    Scheduled surgery times may change and you will be notified if this occurs - please check your personal voicemail for any updates.     On the morning of surgery:   Wear comfortable, loose fitting clothes which open in the front. Please do not wear moisturizers, creams, lotions, makeup or perfume.    Please bring with you to surgery:   Photo ID and insurance card   Current list of medicines and allergies   Pacemaker/ Defibrillator/Heart stent cards   CPAP machine and mask    Slings/ splints/ crutches   A copy of your complete advanced directive/DHPOA.    Please do NOT bring with you to surgery:   All jewelry and valuables should be left at home.   Prosthetic devices such as contact lenses, hearing aids, dentures, eyelash extensions, hairpins and body piercings must be removed prior to going in to the surgical suite.    After outpatient surgery:   A responsible adult MUST accompany you at the time of discharge and stay with you for 24 hours after your surgery. You may NOT drive yourself home after surgery.    Do not drive, operate machinery, make critical decisions or do activities that require co-ordination or balance until after a night’s sleep.   Do not drink alcoholic beverages for 24 hours.   Instructions for resuming your medications will be provided by your surgeon.    CALL YOUR DOCTOR AFTER SURGERY IF YOU HAVE:     Chills and/or a fever of 101° F or higher.    Redness, swelling, pus or drainage from your surgical wound or a bad smell from the wound.    Lightheadedness, fainting or confusion.    Persistent vomiting (throwing up) and are not able to eat or drink for 12 hours.    Three or more loose, watery bowel movements in 24 hours (diarrhea).   Difficulty or pain while urinating(  after non-urological surgery)    Pain and swelling in your legs, especially if it is only on one side.    Difficulty breathing or are breathing faster than normal.    Any new concerning symptoms.        Preoperative Fasting Guidelines    Why must I stop eating and drinking near surgery time?  With sedation, food or liquid in your stomach can enter your lungs causing serious complications  Increases nausea and vomiting    When do I need to stop eating and drinking before my surgery?  Do not eat any food after midnight the night before your surgery/procedure.  FOLLOW ALL INSTRUCTIONS GIVEN TO YOU IN PAT REGARDING PRE OP DRINKS/ERAS.DRINK 13.5OUNCE CLEAR CARB DRINK 2 HOURS BEFORE ARRIVAL TIME        Patient Information: Pre-Operative Infection Prevention Measures     Why did I have my nose, under my arms, and groin swabbed?  The purpose of the swab is to identify Staphylococcus aureus inside your nose or on your skin.  The swab was sent to the laboratory for culture.  A positive swab/culture for Staphylococcus aureus is called colonization or carriage.      What is Staphylococcus aureus?  Staphylococcus aureus, also known as “staph”, is a germ found on the skin or in the nose of healthy people.  Sometimes Staphylococcus aureus can get into the body and cause an infection.  This can be minor (such as pimples, boils, or other skin problems).  It might also be serious (such as a blood infection, pneumonia, or a surgical site infection).    What is Staphylococcus aureus colonization or carriage?  Colonization or carriage means that a person has the germ but is not sick from it.  These bacteria can be spread on the hands or when breathing or sneezing.    How is Staphylococcus aureus spread?  It is most often spread by close contact with a person or item that carries it.    What happens if my culture is positive for Staphylococcus aureus?  Your doctor/medical team will use this information to guide any antibiotic treatment  which may be necessary.  Regardless of the culture results, we will clean the inside of your nose with a betadine swab just before you have your surgery.      Will I get an infection if I have Staphylococcus aureus in my nose or on my skin?  Anyone can get an infection with Staphylococcus aureus.  However, the best way to reduce your risk of infection is to follow the instructions provided to you for the use of your CHG soap and dental rinse.        Patient Information: Oral/Dental Rinse    What is oral/dental rinse?   It is a mouthwash. It is a way of cleaning the mouth with a germ-killing solution before your surgery.  The solution contains chlorhexidine, commonly known as CHG.   It is used inside the mouth to kill a bacteria known as Staphylococcus aureus.  Let your doctor know if you are allergic to Chlorhexidine.    Why do I need to use CHG oral/dental rinse?  The CHG oral/dental rinse helps to kill a bacteria in your mouth known as Staphylococcus aureus.     This reduces the risk of infection at the surgical site.      Using your CHG oral/dental rinse  STEPS:  Use your CHG oral/dental rinse after you brush your teeth the night before (at bedtime) and the morning of your surgery.  Follow all directions on your prescription label.    Use the cap on the container to measure 15ml   Swish (gargle if you can) the mouthwash in your mouth for at least 30 seconds, (do not swallow) and spit out  After you use your CHG rinse, do not rinse your mouth with water, drink or eat.  Please refer to the prescription label for the appropriate time to resume oral intake      What side effects might I have using the CHG oral/dental rinse?  CHG rinse will stick to plaque on the teeth.  Brush and floss just before use.  Teeth brushing will help avoid staining of plaque during use.      Patient Information: Home Preoperative Antibacterial Shower      What is a home preoperative antibacterial shower?  This shower is a way of cleaning  the skin with a germ-killing solution before surgery.  The solution contains chlorhexidine, commonly known as CHG.  CHG is a skin cleanser with germ-killing ability.  Let your doctor know if you are allergic to chlorhexidine.    Why do I need to take a preoperative antibacterial shower?  Skin is not sterile.  It is best to try to make your skin as free of germs as possible before surgery.  Proper cleansing with a germ-killing soap before surgery can lower the number of germs on your skin.  This helps to reduce the risk of infection at the surgical site.  Following the instructions listed below will help you prepare your skin for surgery.      How do I use the solution?  Steps:  Begin using your CHG soap 5 days before your scheduled surgery on ________________________.    First, wash and rinse your hair using the CHG soap. Keep CHG soap away from ear canals and eyes.  Rinse completely, do not condition.  Hair extensions should be removed.  Wash your face with your normal soap and rinse.    Apply the CHG solution to a clean wet washcloth.  Turn the water off or move away from the water spray to avoid premature rinsing of the CHG soap as you are applying.   Firmly lather your entire body from the neck down.  Do not use on your face.  Pay special attention to the area(s) where your incision(s) will be located unless they are on your face.  Avoid scrubbing your skin too hard.  The important point is to have the CHG soap sit on your skin for 3 minutes.    When the 3 minutes are up, turn on the water and rinse the CHG solution off your body completely.   DO NOT wash with regular soap after you have used the CHG soap solution  Pat yourself dry with a clean, freshly-laundered towel.  DO NOT apply powders, deodorants, or lotions.  Dress in clean, freshly laundered nightclothes.    Be sure to sleep with clean, freshly laundered sheets.  Be aware that CHG will cause stains on fabrics; if you wash them with bleach after use.   Rinse your washcloth and other linens that have contact with CHG completely.  Use only non-chlorine detergents to launder the items used.   The morning of surgery is the fifth day.  Repeat the above steps and dress in clean comfortable clothing     Whom should I contact if I have any questions regarding the use of CHG soap?  Call the [facility] at [phone] if you have any questions.              Medication List            Accurate as of January 8, 2025  7:18 AM. Always use your most recent med list.                albuterol 90 mcg/actuation inhaler  Inhale 2 puffs every 4 hours if needed for wheezing.  Medication Adjustments for Surgery: Take/Use as prescribed     diclofenac 75 mg EC tablet  Commonly known as: Voltaren  Take 1 tablet (75 mg) by mouth 2 times a day as needed (Back pain).  Additional Medication Adjustments for Surgery: Take last dose 7 days before surgery     gabapentin 600 mg tablet  Commonly known as: Neurontin  Take 1 tablet (600 mg) by mouth 3 times a day.  Medication Adjustments for Surgery: Take on the morning of surgery     lidocaine-menthol 4-1 % adhesive patch,medicated  Medication Adjustments for Surgery: Take/Use as prescribed     LORazepam 0.5 mg tablet  Commonly known as: Ativan  Take 1 tablet (0.5 mg) by mouth 2 times a day as needed for anxiety.  Medication Adjustments for Surgery: Take/Use as prescribed     methocarbamol 500 mg tablet  Commonly known as: Robaxin  Take 1 tablet (500 mg) by mouth 3 times a day.  Medication Adjustments for Surgery: Take/Use as prescribed     PARoxetine 10 mg tablet  Commonly known as: Paxil  TAKE ONE TABLET BY MOUTH ONCE DAILY AT BEDTIME  Medication Adjustments for Surgery: Take/Use as prescribed  Notes to patient: CONTINUE PER USUAL/TAKE NIGHT BEFORE SURGERY     Vitamin B-12 1,000 mcg tablet  Generic drug: cyanocobalamin

## 2025-01-08 NOTE — CPM/PAT H&P
"CPM/PAT Evaluation       Name: Chris Rivas (Chris Rivas)  /Age: 1970/54 y.o.     In-Person       Chief Complaint: \"back pain\"    HPI  The patient is a 54 year old male.  For the past few years he has experienced lumbar spine pain with rest and this increases in intensity with walking.  The pain radiates down the posterior aspect of both legs and into the feet.  He has associated bilateral leg numbness, tingling, weakness, instability and falls.  His last fall was yesterday.  He had a radiofrequency lumbar nerve ablation in  and he had some back pain improvement for 9 months.  However, the above symptoms returned and surgical intervention is recommended.    Separately, he has had nasal congestion > 7 days.  COVID-19 testing done during PAT visit.    Past Medical History:   Diagnosis Date    Anxiety     Arthritis     Chronic pain disorder     COPD (chronic obstructive pulmonary disease) (Multi)     Headache     Hyperlipidemia     Lower back pain     Lumbar spondylosis     Neck pain     Panic attacks     Sleep apnea        Past Surgical History:   Procedure Laterality Date    APPENDECTOMY  2010    JOINT REPLACEMENT Left     ELBOW    JOINT REPLACEMENT Right     ANKLE    LUNG SURGERY Right 2014    Partial lung lobectomy    OTHER SURGICAL HISTORY  2023    Lumbar spine nerve ablation       Family History   Problem Relation Name Age of Onset    Leukemia Mother Taylor     Cancer Mother Taylor     Dementia Father       Social History     Tobacco Use    Smoking status: Every Day     Current packs/day: 0.50     Average packs/day: 0.5 packs/day for 47.0 years (23.5 ttl pk-yrs)     Types: Cigarettes     Start date:      Last attempt to quit:     Smokeless tobacco: Never    Tobacco comments:     PT DOWN TO 4 CIGARETTES DAILY OVER LAST MONTH   Substance Use Topics    Alcohol use: Not Currently     Alcohol/week: 2.0 standard drinks of alcohol     Types: 2 Standard drinks or equivalent per week " "    Social History     Substance and Sexual Activity   Drug Use Yes    Types: Marijuana    Comment: OCC/SELDOM GUMMY       Allergies   Allergen Reactions    Lavender Anaphylaxis    Oxycodone-Acetaminophen Nausea/vomiting     Current Outpatient Medications   Medication Sig Dispense Refill    albuterol 90 mcg/actuation inhaler Inhale 2 puffs every 4 hours if needed for wheezing. 18 g 2    chlorhexidine (Peridex) 0.12 % solution Use as directed. 473 mL 0    cyanocobalamin (Vitamin B-12) 1,000 mcg tablet Take 1 tablet (1,000 mcg) by mouth once every 24 hours.      diclofenac (Voltaren) 75 mg EC tablet Take 1 tablet (75 mg) by mouth 2 times a day as needed (Back pain). 180 tablet 0    gabapentin (Neurontin) 600 mg tablet Take 1 tablet (600 mg) by mouth 3 times a day. 90 tablet 2    lidocaine-menthol 4-1 % adhesive patch,medicated Apply topically.      LORazepam (Ativan) 0.5 mg tablet Take 1 tablet (0.5 mg) by mouth 2 times a day as needed for anxiety. 30 tablet 2    methocarbamol (Robaxin) 500 mg tablet Take 1 tablet (500 mg) by mouth 3 times a day. (Patient not taking: Reported on 1/8/2025) 90 tablet 1    PARoxetine (Paxil) 10 mg tablet TAKE ONE TABLET BY MOUTH ONCE DAILY AT BEDTIME 90 tablet 1     No current facility-administered medications for this visit.     Review of Systems   Musculoskeletal:  Positive for arthralgias and back pain.   Neurological:  Positive for light-headedness (occasional).   All other systems reviewed and are negative.    /71   Pulse 79   Temp 36.7 °C (98.1 °F) (Temporal)   Resp 16   Ht 2.007 m (6' 7\")   Wt 96.2 kg (212 lb)   SpO2 98%   BMI 23.88 kg/m²       Physical Exam  Vitals reviewed.   Constitutional:       Appearance: Normal appearance.   HENT:      Head: Normocephalic and atraumatic.      Mouth/Throat:      Mouth: Mucous membranes are moist.      Pharynx: Oropharynx is clear.   Eyes:      Extraocular Movements: Extraocular movements intact.      Pupils: Pupils are equal, " round, and reactive to light.   Cardiovascular:      Rate and Rhythm: Normal rate and regular rhythm.      Heart sounds: Normal heart sounds.   Pulmonary:      Effort: Pulmonary effort is normal.      Breath sounds: Wheezing (bilateral inspiratory lung wheezing noted) present.   Abdominal:      General: Abdomen is flat.      Palpations: Abdomen is soft.   Musculoskeletal:      Comments: Lumbar spine range of motion deferred due to pain.   Skin:     General: Skin is warm and dry.   Neurological:      General: No focal deficit present.      Mental Status: He is alert and oriented to person, place, and time.   Psychiatric:         Mood and Affect: Mood normal.         Behavior: Behavior normal.        PAT AIRWAY:   Airway:     Mallampati::  IV    TM distance::  >3 FB    Neck ROM::  Limited   Lower teeth in fair condition.   upper dentures    ASA:  II  DASI SCORE:  42.7  METS SCORE:  8  CHAD2 SCORE:  1.9%  REVISED CARDIAC RISK INDEX:  0.4%  STOP BANG SCORE:  3  CAPRINI DVT SCORE:  5  LARRY SCORE:  0.07%  ARISCAT SCORE:   1.6%    EKG (preliminary in PAT) - sinus rhythm with premature supraventricular complexes, possible left atrial enlargement, left axis deviation    CBC, BMP, PT/INR, TYPE AND SCREEN, NICOTINE, CHEST X-RAY, COVID-19    CHEST X-RAY:  Right basilar opacities suspicious for pneumonia in the appropriate clinical setting. Recommend clinical correlation and follow-up to ensure resolution.  Surgeon notified via SECURE CHAT MESSAGING of results    Assessment and Plan:     Lumbar radiculopathy, lumbar stenosis with neurogenic claudication:  Bilateral lumbar 4-5 laminectomy, lumbar 5 - sacral 1 left hemilaminectomy  NIKHIL - non-compliant with CPAP  COPD - controlled with albuterol inhaler as needed  H/O pulmonary nodule - s/p right partial lung lobectomy 2014  Smoker  Positive COVID-19 testing - patient and surgeon notified - surgeon wants to proceed with         surgery    TELEPHONE NOTE 1/9/2025:  Left message with  Mr. Rivas confirming that we can move forward with surgery next week as scheduled on 1/16/25. Received in writing from our Associate CMO of perioperative anesthesia (Dr. Catherine) that  is in the process of modifying perioperative recommendations for COVID-19 to allow surgery 2 weeks after symptom onset, as long as patient is asymptomatic. We received this in writing and was forwarded to [facility] anesthesia team.        Neda Garcia PA-C

## 2025-01-09 ENCOUNTER — TELEPHONE (OUTPATIENT)
Dept: ORTHOPEDIC SURGERY | Facility: CLINIC | Age: 55
End: 2025-01-09
Payer: COMMERCIAL

## 2025-01-09 LAB
ATRIAL RATE: 83 BPM
P AXIS: 65 DEGREES
P OFFSET: 213 MS
P ONSET: 149 MS
PR INTERVAL: 160 MS
Q ONSET: 229 MS
QRS COUNT: 14 BEATS
QRS DURATION: 74 MS
QT INTERVAL: 358 MS
QTC CALCULATION(BAZETT): 420 MS
QTC FREDERICIA: 399 MS
R AXIS: -43 DEGREES
T AXIS: 71 DEGREES
T OFFSET: 408 MS
VENTRICULAR RATE: 83 BPM

## 2025-01-09 NOTE — TELEPHONE ENCOUNTER
Left message with Mr. Rivas confirming that we can move forward with surgery next week as scheduled on 1/16/25. Received in writing from our Associate CMO of perioperative anesthesia (Dr. Catherine) that  is in the process of modifying perioperative recommendations for COVID-19 to allow surgery 2 weeks after symptom onset, as long as patient is asymptomatic. We received this in writing and was forwarded to [facility] anesthesia team.     --    Crow Wilkins MD  Orthopaedic Spine Surgery  , Department of Orthopaedic Surgery  [facility]

## 2025-01-10 LAB — STAPHYLOCOCCUS SPEC CULT: ABNORMAL

## 2025-01-13 ENCOUNTER — APPOINTMENT (OUTPATIENT)
Dept: PHYSICAL THERAPY | Facility: CLINIC | Age: 55
End: 2025-01-13
Payer: COMMERCIAL

## 2025-01-13 LAB
ATRIAL RATE: 83 BPM
COTININE SERPL-MCNC: 386 NG/ML
NICOTINE SERPL-MCNC: 9 NG/ML
P AXIS: 65 DEGREES
P OFFSET: 213 MS
P ONSET: 149 MS
PR INTERVAL: 160 MS
Q ONSET: 229 MS
QRS COUNT: 14 BEATS
QRS DURATION: 74 MS
QT INTERVAL: 358 MS
QTC CALCULATION(BAZETT): 420 MS
QTC FREDERICIA: 399 MS
R AXIS: -43 DEGREES
T AXIS: 71 DEGREES
T OFFSET: 408 MS
VENTRICULAR RATE: 83 BPM

## 2025-01-14 ENCOUNTER — TELEPHONE (OUTPATIENT)
Dept: PRIMARY CARE | Facility: CLINIC | Age: 55
End: 2025-01-14
Payer: COMMERCIAL

## 2025-01-14 NOTE — TELEPHONE ENCOUNTER
AWARE  IS OUT OF THE OFFICE ON TODAY ,RETURNING ON TOMORROW.    Chris called stating last Wednesday on 01/08/25 he had a pre surgical procedure and a XR chest 2 view was done. He was diagnosed with pneumonia. He is calling asking for a follow up. Chris's # [phone]

## 2025-01-20 ENCOUNTER — TELEPHONE (OUTPATIENT)
Dept: PRIMARY CARE | Facility: CLINIC | Age: 55
End: 2025-01-20
Payer: COMMERCIAL

## 2025-01-20 ENCOUNTER — HOSPITAL ENCOUNTER (OUTPATIENT)
Dept: RADIOLOGY | Facility: HOSPITAL | Age: 55
Discharge: HOME | End: 2025-01-20
Payer: COMMERCIAL

## 2025-01-20 DIAGNOSIS — J18.9 PNEUMONIA DUE TO INFECTIOUS ORGANISM, UNSPECIFIED LATERALITY, UNSPECIFIED PART OF LUNG: ICD-10-CM

## 2025-01-20 DIAGNOSIS — J18.9 PNEUMONIA DUE TO INFECTIOUS ORGANISM, UNSPECIFIED LATERALITY, UNSPECIFIED PART OF LUNG: Primary | ICD-10-CM

## 2025-01-20 PROCEDURE — 71046 X-RAY EXAM CHEST 2 VIEWS: CPT | Performed by: RADIOLOGY

## 2025-01-20 PROCEDURE — 71046 X-RAY EXAM CHEST 2 VIEWS: CPT

## 2025-01-20 NOTE — LETTER
January 20, 2025     Patient: Chris Rivas   YOB: 1970   Date of Visit: 1/20/2025       To Whom It May Concern:    Please excuse Chris Rivas from 01/13/2025 to 01/20/2025. He is able to return to work on 01/21/2025.  If you have any questions or concerns, please don't hesitate to call.         Sincerely,         DO So Ro LPN        CC:

## 2025-01-20 NOTE — TELEPHONE ENCOUNTER
Patient aware of 1/22/2025 RTW date.  Patient was never given an antibiotic.  Patient has been taking NyQuil.

## 2025-01-20 NOTE — TELEPHONE ENCOUNTER
Patient stopped in and needed forms filled out for being off work for Pneumonia. He said that he was diagnosed with COVID first and then they hear weezing at his PAT and that it when they sent him for XRAY that showed pneumonia. He said that he went back to work for 2 days and then he said he was having such a hard time breathing so he couldn't work that was on January 13th. He states that he was not given any medication. So he said that no one had told him to be off work he just couldn't work due to the SOB. I advised him that he needs to go have a repeat Chest XRAY and that we could write him a note to return to work. Patient said that he is going to go up and have the Xray done and then will come back to  the RTW note. Advised that [facility] paperwork will not be completed until Wednesday. PL

## 2025-01-21 RX ORDER — AZITHROMYCIN 250 MG/1
TABLET, FILM COATED ORAL
Qty: 6 TABLET | Refills: 0 | Status: SHIPPED | OUTPATIENT
Start: 2025-01-21 | End: 2025-01-26

## 2025-01-23 ENCOUNTER — PATIENT MESSAGE (OUTPATIENT)
Dept: PRIMARY CARE | Facility: CLINIC | Age: 55
End: 2025-01-23
Payer: COMMERCIAL

## 2025-01-23 ENCOUNTER — PRE-ADMISSION TESTING (OUTPATIENT)
Dept: PREADMISSION TESTING | Facility: HOSPITAL | Age: 55
End: 2025-01-23
Payer: COMMERCIAL

## 2025-01-23 VITALS
BODY MASS INDEX: 25.08 KG/M2 | TEMPERATURE: 97 F | OXYGEN SATURATION: 99 % | WEIGHT: 216.8 LBS | DIASTOLIC BLOOD PRESSURE: 67 MMHG | SYSTOLIC BLOOD PRESSURE: 124 MMHG | RESPIRATION RATE: 18 BRPM | HEART RATE: 64 BPM | HEIGHT: 78 IN

## 2025-01-23 DIAGNOSIS — Z01.818 PREOP TESTING: Primary | ICD-10-CM

## 2025-01-23 LAB
ABO GROUP (TYPE) IN BLOOD: NORMAL
ANTIBODY SCREEN: NORMAL
BASOPHILS # BLD MANUAL: 0.12 X10*3/UL (ref 0–0.1)
BASOPHILS NFR BLD MANUAL: 2 %
EOSINOPHIL # BLD MANUAL: 0.06 X10*3/UL (ref 0–0.7)
EOSINOPHIL NFR BLD MANUAL: 1 %
ERYTHROCYTE [DISTWIDTH] IN BLOOD BY AUTOMATED COUNT: 12.8 % (ref 11.5–14.5)
HCT VFR BLD AUTO: 34.7 % (ref 41–52)
HGB BLD-MCNC: 11.3 G/DL (ref 13.5–17.5)
IMM GRANULOCYTES # BLD AUTO: 0.02 X10*3/UL (ref 0–0.7)
IMM GRANULOCYTES NFR BLD AUTO: 0.3 % (ref 0–0.9)
LYMPHOCYTES # BLD MANUAL: 1.92 X10*3/UL (ref 1.2–4.8)
LYMPHOCYTES NFR BLD MANUAL: 32 %
MCH RBC QN AUTO: 30.1 PG (ref 26–34)
MCHC RBC AUTO-ENTMCNC: 32.6 G/DL (ref 32–36)
MCV RBC AUTO: 92 FL (ref 80–100)
MONOCYTES # BLD MANUAL: 0.36 X10*3/UL (ref 0.1–1)
MONOCYTES NFR BLD MANUAL: 6 %
NEUTROPHILS # BLD MANUAL: 3.48 X10*3/UL (ref 1.2–7.7)
NEUTS BAND # BLD MANUAL: 0.06 X10*3/UL (ref 0–0.7)
NEUTS BAND NFR BLD MANUAL: 1 %
NEUTS SEG # BLD MANUAL: 3.42 X10*3/UL (ref 1.2–7)
NEUTS SEG NFR BLD MANUAL: 57 %
NRBC BLD-RTO: 0 /100 WBCS (ref 0–0)
PLATELET # BLD AUTO: 337 X10*3/UL (ref 150–450)
RBC # BLD AUTO: 3.76 X10*6/UL (ref 4.5–5.9)
RBC MORPH BLD: ABNORMAL
RH FACTOR (ANTIGEN D): NORMAL
TOTAL CELLS COUNTED BLD: 100
VARIANT LYMPHS # BLD MANUAL: 0.06 X10*3/UL (ref 0–0.5)
VARIANT LYMPHS NFR BLD: 1 %
WBC # BLD AUTO: 6 X10*3/UL (ref 4.4–11.3)

## 2025-01-23 PROCEDURE — 36415 COLL VENOUS BLD VENIPUNCTURE: CPT

## 2025-01-23 PROCEDURE — 85027 COMPLETE CBC AUTOMATED: CPT

## 2025-01-23 PROCEDURE — 85007 BL SMEAR W/DIFF WBC COUNT: CPT

## 2025-01-23 PROCEDURE — 99204 OFFICE O/P NEW MOD 45 MIN: CPT | Performed by: NURSE PRACTITIONER

## 2025-01-23 PROCEDURE — 86901 BLOOD TYPING SEROLOGIC RH(D): CPT

## 2025-01-23 PROCEDURE — 83036 HEMOGLOBIN GLYCOSYLATED A1C: CPT | Mod: TRILAB

## 2025-01-23 ASSESSMENT — ENCOUNTER SYMPTOMS
ENDOCRINE NEGATIVE: 1
ACTIVITY CHANGE: 1
ARTHRALGIAS: 1
PSYCHIATRIC NEGATIVE: 1
BACK PAIN: 1
EYES NEGATIVE: 1
RESPIRATORY NEGATIVE: 1
HEMATOLOGIC/LYMPHATIC NEGATIVE: 1
NEUROLOGICAL NEGATIVE: 1
SHORTNESS OF BREATH: 0
CARDIOVASCULAR NEGATIVE: 1
GASTROINTESTINAL NEGATIVE: 1

## 2025-01-23 ASSESSMENT — PAIN DESCRIPTION - DESCRIPTORS: DESCRIPTORS: ACHING

## 2025-01-23 ASSESSMENT — PAIN - FUNCTIONAL ASSESSMENT: PAIN_FUNCTIONAL_ASSESSMENT: 0-10

## 2025-01-23 ASSESSMENT — PAIN SCALES - GENERAL: PAINLEVEL_OUTOF10: 8

## 2025-01-23 NOTE — CPM/PAT H&P
CPM/PAT Evaluation       Name: Chris Rivas (Chris Rivas)  /Age: 1970/54 y.o.     In-Person       Chief Complaint: back pain     HPI    Pt is a 54 year old male with chronic lower back pain. Pt reports over the past few years having lower back pain. Pt describes the pain as an aching sharp pain that radiates down his bilateral legs to his bilateral feet. The pain occurs with with prolonged sitting, standing, and walking. Pt reports he has experiences falls from his legs feeling unstable. Pt has been taking gabapentin which only mildly improves her pain. Pt denies loss of bowel of bladder function. Pt surgeon has scheduled him for surgery. Pt denies CP, SOB, or dizziness.     Pt surgery was postponed earlier this month d/t testing positive for covid and having pneumonia. Pt has completed his antibiotic azithromycin. Pt also completed a follow up xray that showed: Interval resolution/near resolution right basilar airspace opacities. Pt stated he feels improved. Pt denies SOB.     Past Medical History:   Diagnosis Date    Anxiety     Arthritis     Chronic pain disorder     COPD (chronic obstructive pulmonary disease) (Multi)     Headache     Hyperlipidemia     Lower back pain     Lumbar spondylosis     Neck pain     Panic attacks     Sleep apnea        Past Surgical History:   Procedure Laterality Date    APPENDECTOMY  2010    JOINT REPLACEMENT Left     ELBOW    JOINT REPLACEMENT Right     ANKLE    LUNG SURGERY Right     Partial lung lobectomy    OTHER SURGICAL HISTORY  2023    Lumbar spine nerve ablation     Social History     Tobacco Use    Smoking status: Every Day     Current packs/day: 0.50     Average packs/day: 0.5 packs/day for 52.1 years (26.0 ttl pk-yrs)     Types: Cigarettes     Start date:      Last attempt to quit:     Smokeless tobacco: Never    Tobacco comments:     PT DOWN TO 4 CIGARETTES DAILY OVER LAST MONTH   Substance Use Topics    Alcohol use: Not Currently      Alcohol/week: 2.0 standard drinks of alcohol     Types: 2 Standard drinks or equivalent per week     Comment: 1 x week - none for a week     Social History     Substance and Sexual Activity   Drug Use Yes    Types: Marijuana    Comment: OCC/SELDOM GUMMY       Patient  reports being sexually active and has had partner(s) who are female. He reports using the following method of birth control/protection: None.    Family History   Problem Relation Name Age of Onset    Leukemia Mother Taylor     Cancer Mother Taylor     Dementia Father         Allergies   Allergen Reactions    Lavender Anaphylaxis    Oxycodone-Acetaminophen Nausea/vomiting     Current Outpatient Medications   Medication Sig Dispense Refill    albuterol 90 mcg/actuation inhaler Inhale 2 puffs every 4 hours if needed for wheezing. 18 g 2    azithromycin (Zithromax) 250 mg tablet Take 2 tablets (500 mg) by mouth once daily for 1 day, THEN 1 tablet (250 mg) once daily for 4 days. 6 tablet 0    chlorphenir/phenyleph/aspirin (VI-SELTZER SEVERE COLD ORAL) Take by mouth.      cyanocobalamin (Vitamin B-12) 1,000 mcg tablet Take 1 tablet (1,000 mcg) by mouth once every 24 hours.      diclofenac (Voltaren) 75 mg EC tablet Take 1 tablet (75 mg) by mouth 2 times a day as needed (Back pain). 180 tablet 0    gabapentin (Neurontin) 600 mg tablet Take 1 tablet (600 mg) by mouth 3 times a day. 90 tablet 2    lidocaine-menthol 4-1 % adhesive patch,medicated Apply topically.      LORazepam (Ativan) 0.5 mg tablet Take 1 tablet (0.5 mg) by mouth 2 times a day as needed for anxiety. 30 tablet 2    PARoxetine (Paxil) 10 mg tablet TAKE ONE TABLET BY MOUTH ONCE DAILY AT BEDTIME 90 tablet 1       1     No current facility-administered medications for this visit.     Review of Systems   Constitutional:  Positive for activity change.   HENT: Negative.     Eyes: Negative.    Respiratory: Negative.  Negative for shortness of breath.    Cardiovascular: Negative.    Gastrointestinal:  "Negative.    Endocrine: Negative.    Genitourinary: Negative.    Musculoskeletal:  Positive for arthralgias and back pain.   Skin: Negative.    Neurological: Negative.    Hematological: Negative.    Psychiatric/Behavioral: Negative.       Physical Exam  Vitals reviewed.   Constitutional:       Appearance: Normal appearance. He is normal weight.   HENT:      Head: Normocephalic and atraumatic.      Nose: Nose normal.      Mouth/Throat:      Mouth: Mucous membranes are moist.      Pharynx: Oropharynx is clear.   Eyes:      Extraocular Movements: Extraocular movements intact.      Conjunctiva/sclera: Conjunctivae normal.      Pupils: Pupils are equal, round, and reactive to light.   Cardiovascular:      Rate and Rhythm: Normal rate and regular rhythm.      Pulses: Normal pulses.      Heart sounds: Normal heart sounds.   Pulmonary:      Effort: Pulmonary effort is normal. No respiratory distress.      Breath sounds: Normal breath sounds. No wheezing, rhonchi or rales.      Comments: No conversational dyspnea  Abdominal:      General: Bowel sounds are normal.      Palpations: Abdomen is soft.   Musculoskeletal:      Cervical back: Normal range of motion and neck supple.      Comments: Deferred examination of lower back to surgeon    Skin:     General: Skin is warm and dry.   Neurological:      General: No focal deficit present.      Mental Status: He is alert and oriented to person, place, and time. Mental status is at baseline.   Psychiatric:         Mood and Affect: Mood normal.         Behavior: Behavior normal.         Thought Content: Thought content normal.         Judgment: Judgment normal.          PAT AIRWAY:   Airway:     Mallampati::  III    TM distance::  >3 FB    Neck ROM::  Limited   upper dentures      Visit Vitals  /67   Pulse 64   Temp 36.1 °C (97 °F) (Temporal)   Resp 18   Ht 2.007 m (6' 7.02\")   Wt 98.3 kg (216 lb 12.8 oz)   SpO2 99%   BMI 24.41 kg/m²   Smoking Status Every Day   BSA 2.34 m² "     ASA: 3  CHADS: 2.8%  RCRI: 0.9%  Ariscat: 1.6 low risk  DASI Risk Score      Flowsheet Row [facility]   Can you take care of yourself (eat, dress, bathe, or use toilet)?  2.75 filed at 01/08/2025 0714   Can you walk indoors, such as around your house? 1.75 filed at 01/08/2025 0714   Can you walk a block or two on level ground?  2.75 filed at 01/08/2025 0714   Can you climb a flight of stairs or walk up a hill? 5.5 filed at 01/08/2025 0714   Can you run a short distance? 8 filed at 01/08/2025 0714   Can you do light work around the house like dusting or washing dishes? 2.7 filed at 01/08/2025 0714   Can you do moderate work around the house like vacuuming, sweeping floors or carrying groceries? 3.5 filed at 01/08/2025 0714   Can you do heavy work around the house like scrubbing floors or lifting and moving heavy furniture?  0 filed at 01/08/2025 0714   Can you do yard work like raking leaves, weeding or pushing a mower? 4.5 filed at 01/08/2025 0714   Can you have sexual relations? 5.25 filed at 01/08/2025 0714   Can you participate in moderate recreational activities like golf, bowling, dancing, doubles tennis or throwing a baseball or football? 6  [MINIMAL DUE TO BACK PAIN] filed at 01/08/2025 0714   Can you participate in strenous sports like swimming, singles tennis, football, basketball, or skiing? 0 filed at 01/08/2025 0714   DASI SCORE 42.7 filed at 01/08/2025 0714   METS Score (Will be calculated only when all the questions are answered) 8 filed at 01/08/2025 0714          Caprini DVT Assessment      Flowsheet Row Pre-Admission Testing [facility]   DVT Score (IF A SCORE IS NOT CALCULATING, MUST SELECT A BMI TO COMPLETE) 5 filed at 01/08/2025 0738   Surgical Factors Major surgery planned, lasting 2-3 hours filed at 01/08/2025 0738   BMI (BMI MUST BE CHOSEN) 30 or less filed at 01/08/2025 0738          Modified Frailty  Index    No data to display       CHADS2 Stroke Risk  Current as of 3 hours ago        N/A 3 to 100%: High Risk   2 to < 3%: Medium Risk   0 to < 2%: Low Risk     Last Change: N/A          This score determines the patient's risk of having a stroke if the patient has atrial fibrillation.        This score is not applicable to this patient. Components are not calculated.          Revised Cardiac Risk Index      Flowsheet Row Pre-Admission Testing from 1/23/2025 in [facility]   High-Risk Surgery (Intraperitoneal, Intrathoracic,Suprainguinal vascular) 1 filed at 01/23/2025 1626   History of ischemic heart disease (History of MI, History of positive exercuse test, Current chest paint considered due to myocardial ischemia, Use of nitrate therapy, ECG with pathological Q Waves) 0 filed at 01/23/2025 1626   History of congestive heart failure (pulmonary edemia, bilateral rales or S3 gallop, Paroxysmal nocturnal dyspnea, CXR showing pulmonary vascular redistribution) 0 filed at 01/23/2025 1626   History of cerebrovascular disease (Prior TIA or stroke) 0 filed at 01/23/2025 1626   Pre-operative insulin treatment 0 filed at 01/23/2025 1626   Pre-operative creatinine>2 mg/dl 0 filed at 01/23/2025 1626   Revised Cardiac Risk Calculator 1 filed at 01/23/2025 1626          Apfel Simplified Score    No data to display       Risk Analysis Index Results This Encounter    No data found in the last 10 encounters.       Stop Bang Score      Flowsheet Row Pre-Admission Testing from 1/8/2025 in [facility]   Do you snore loudly? 1  [NON COMPLIANT WITH CPAP] filed at 01/08/2025 0715   Do you often feel tired or fatigued after your sleep? 0 filed at 01/08/2025 0715   Has anyone ever observed you stop breathing in your sleep? 0 filed at 01/08/2025 0715   Do you have or are you being treated for high blood pressure? 0 filed at 01/08/2025 0715   Recent BMI (Calculated) 23.9 filed at 01/08/2025 0715   Is BMI  greater than 35 kg/m2? 0=No filed at 01/08/2025 0715   Age older than 50 years old? 1=Yes filed at 01/08/2025 0715   Is your neck circumference greater than 17 inches (Male) or 16 inches (Female)? 0 filed at 01/08/2025 0715   Gender - Male 1=Yes filed at 01/08/2025 0715   STOP-BANG Total Score 3 filed at 01/08/2025 0715          Prodigy: High Risk  Total Score: 8              Prodigy Gender Score          ARISCAT Score for Postoperative Pulmonary Complications      Flowsheet Row Pre-Admission Testing from 1/8/2025 in [facility]   Age Calculated Score 3 filed at 01/08/2025 0751   Preoperative SpO2 0 filed at 01/08/2025 0751   Respiratory infection in the last month Either upper or lower (i.e., URI, bronchitis, pneumonia), with fever and antibiotic treatment 0 filed at 01/08/2025 0751   Preoperative anemia (Hgb less than 10 g/dl) 0 filed at 01/08/2025 0751   Surgical incision  0 filed at 01/08/2025 0751   Duration of surgery  16 filed at 01/08/2025 0751   Emergency Procedure  0 filed at 01/08/2025 0751   ARISCAT Total Score  19 filed at 01/08/2025 0751          Barkley Perioperative Risk for Myocardial Infarction or Cardiac Arrest (LARRY)      Flowsheet Row Pre-Admission Testing from 1/8/2025 in [facility]   Calculated Age Score 1.12 filed at 01/08/2025 0738   Functional Status  0 filed at 01/08/2025 0738   ASA Class  -3.29 filed at 01/08/2025 0738   Creatinine 0 filed at 01/08/2025 0738   Type of Procedure  0.21 filed at 01/08/2025 0738   LARRY Total Score  -7.21 filed at 01/08/2025 0738   LARRY % 0.07 filed at 01/08/2025 0738            Assessment and Plan:     Lumbar radiculopathy, Lumbar stenosis with neurogenic claudication: Bilateral Lumbar 4-5 Laminectomy; Lumbar 5 - Sacral 1 Left Hemilaminectomy.  COPD: rare albuterol inhaler use.   Positive Covid: on 1/8/2025  Recent Pneumonia: Pt was treated with Azithromycin. Pt has had a followed up chest Xray that showed: Interval  resolution/near resolution right basilar airspace opacities. Chronic lung disease with likely COPD changes similar to the previous  NIKHIL: non-compliant with CPAP use.   H/O pulmonary nodule: s/p right partial lung lobectomy 2014   Daily cigarette smoker  Occasional marijuana edible.     CBC, HgbA1C, and T&S collected in PAT.  BMP, COAG screen, and MRSA swab completed on 1/8/2025  EKG completed on 1/8/2025.    Char Martinez, APRN-CNP

## 2025-01-24 LAB
EST. AVERAGE GLUCOSE BLD GHB EST-MCNC: 111 MG/DL
HBA1C MFR BLD: 5.5 %

## 2025-01-29 ENCOUNTER — OFFICE VISIT (OUTPATIENT)
Dept: PRIMARY CARE | Facility: CLINIC | Age: 55
End: 2025-01-29
Payer: COMMERCIAL

## 2025-01-29 VITALS
HEIGHT: 78 IN | BODY MASS INDEX: 25.24 KG/M2 | HEART RATE: 61 BPM | TEMPERATURE: 98 F | DIASTOLIC BLOOD PRESSURE: 74 MMHG | OXYGEN SATURATION: 94 % | SYSTOLIC BLOOD PRESSURE: 112 MMHG | RESPIRATION RATE: 18 BRPM | WEIGHT: 218.2 LBS

## 2025-01-29 DIAGNOSIS — M47.816 LUMBAR FACET ARTHROPATHY: Primary | ICD-10-CM

## 2025-01-29 DIAGNOSIS — Z72.0 TOBACCO USE: ICD-10-CM

## 2025-01-29 DIAGNOSIS — M48.062 LUMBAR STENOSIS WITH NEUROGENIC CLAUDICATION: ICD-10-CM

## 2025-01-29 DIAGNOSIS — Z01.818 PREOPERATIVE EXAMINATION: ICD-10-CM

## 2025-01-29 DIAGNOSIS — Z87.01 HISTORY OF PNEUMONIA: ICD-10-CM

## 2025-01-29 PROCEDURE — 99213 OFFICE O/P EST LOW 20 MIN: CPT | Performed by: FAMILY MEDICINE

## 2025-01-29 PROCEDURE — 3008F BODY MASS INDEX DOCD: CPT | Performed by: FAMILY MEDICINE

## 2025-01-29 RX ORDER — DIPHENHYDRAMINE HCL 25 MG
4 CAPSULE ORAL EVERY 2 HOUR PRN
Qty: 200 EACH | Refills: 1 | Status: SHIPPED | OUTPATIENT
Start: 2025-01-29 | End: 2025-02-28

## 2025-01-29 ASSESSMENT — PATIENT HEALTH QUESTIONNAIRE - PHQ9
SUM OF ALL RESPONSES TO PHQ9 QUESTIONS 1 & 2: 0
2. FEELING DOWN, DEPRESSED OR HOPELESS: NOT AT ALL
1. LITTLE INTEREST OR PLEASURE IN DOING THINGS: NOT AT ALL

## 2025-01-29 ASSESSMENT — ENCOUNTER SYMPTOMS
OCCASIONAL FEELINGS OF UNSTEADINESS: 1
DEPRESSION: 0
LOSS OF SENSATION IN FEET: 1

## 2025-01-29 ASSESSMENT — PAIN SCALES - GENERAL: PAINLEVEL_OUTOF10: 10-WORST PAIN EVER

## 2025-01-29 NOTE — LETTER
January 29, 2025     Patient: Chris Rivas   YOB: 1970   Date of Visit: 1/29/2025       To Whom It May Concern:    Chris Rivas was seen in my clinic on 1/29/2025 at 3:45 pm. Please excuse Chris for his absence from work on this day to make the appointment. Please excuse from 1/13/25 until 2/6/25 due to pneumonia and back issues.  Pt is scheduled for surgery on 2/6/25.      If you have any questions or concerns, please don't hesitate to call.         Sincerely,         Beka Gomez,         CC: No Recipients

## 2025-01-29 NOTE — PROGRESS NOTES
"Subjective   Patient ID: Chris Rivas is a 54 y.o. male who presents for surgical clearance.    Medical Clearance  -Type of Surgery:  Bilateral lumbar 4-5 laminectomy, lumbar 5 - sacral 1 left hemilaminectomy  -Diagnosis: Lumbar radiculopathy, lumbar stenosis with neurogenic claudication:    -Date of Surgery: 2/6/25  -Surgeon: Dr. Wilkins  -[facility]  -Issues with Anesthesia: None    Pt had preadmission testing 1/23/25.  Patient was at preop last month - pt was positive for COVID.  Pt had xray - patient had pneumonia.  Patient was treated with azithromycin.  Pt is feeling better.           Review of Systems    Objective   /74   Pulse 61   Temp 36.7 °C (98 °F) (Temporal)   Resp 18   Ht 2.007 m (6' 7\")   Wt 99 kg (218 lb 3.2 oz)   SpO2 94%   BMI 24.58 kg/m²     Physical Exam  Vitals reviewed.   Constitutional:       General: He is not in acute distress.  Cardiovascular:      Rate and Rhythm: Normal rate and regular rhythm.   Pulmonary:      Effort: Pulmonary effort is normal.      Breath sounds: No wheezing or rhonchi.   Musculoskeletal:      Right lower leg: No edema.      Left lower leg: No edema.   Lymphadenopathy:      Cervical: No cervical adenopathy.   Neurological:      Mental Status: He is alert.         Assessment/Plan   Diagnoses and all orders for this visit:  Lumbar facet arthropathy  Lumbar stenosis with neurogenic claudication  History of pneumonia  -     XR chest 2 views; Future  Preoperative examination  Tobacco use  -     nicotine polacrilex (Nicorette) 4 mg gum; Chew 1 each (4 mg) every 2 hours if needed for smoking cessation.    Patient Instructions   Pt is here for preoperative evaluation for lumbar back surgery.     During PAT testing - found to have pneumonia.  Pt was treated with antibiotics.  Breathing is improved.  Feeling better.  Recheck xray - if negative we will clear.  If abnormal we will check CT scan.  We will call with results.     Work note given.       "

## 2025-01-29 NOTE — PATIENT INSTRUCTIONS
Pt is here for preoperative evaluation for lumbar back surgery.     During PAT testing - found to have pneumonia.  Pt was treated with antibiotics.  Breathing is improved.  Feeling better.  Recheck xray - if negative we will clear.  If abnormal we will check CT scan.  We will call with results.     Work note given.

## 2025-01-30 ENCOUNTER — HOSPITAL ENCOUNTER (OUTPATIENT)
Dept: RADIOLOGY | Facility: HOSPITAL | Age: 55
Discharge: HOME | End: 2025-01-30
Payer: COMMERCIAL

## 2025-01-30 DIAGNOSIS — Z87.01 HISTORY OF PNEUMONIA: ICD-10-CM

## 2025-01-30 PROCEDURE — 71046 X-RAY EXAM CHEST 2 VIEWS: CPT

## 2025-02-02 ENCOUNTER — TELEPHONE (OUTPATIENT)
Dept: PRIMARY CARE | Facility: CLINIC | Age: 55
End: 2025-02-02
Payer: COMMERCIAL

## 2025-02-02 NOTE — TELEPHONE ENCOUNTER
Dr. Franky Manning I saw Mr Rob last week.  Chest xray repeated and is clear.  Pt was placed on antibiotics and has finished his script.  Feeling well.  He is cleared for surgery.  Let me know if you need anything further.  Pt is aware about xray.

## 2025-02-05 ENCOUNTER — ANESTHESIA EVENT (OUTPATIENT)
Dept: OPERATING ROOM | Facility: HOSPITAL | Age: 55
End: 2025-02-05
Payer: COMMERCIAL

## 2025-02-05 PROBLEM — J44.9 CHRONIC OBSTRUCTIVE PULMONARY DISEASE (MULTI): Status: ACTIVE | Noted: 2025-02-05

## 2025-02-05 PROBLEM — G47.33 OSA (OBSTRUCTIVE SLEEP APNEA): Status: ACTIVE | Noted: 2025-02-05

## 2025-02-05 PROBLEM — F17.210 CIGARETTE SMOKER: Status: ACTIVE | Noted: 2025-02-05

## 2025-02-05 SDOH — HEALTH STABILITY: MENTAL HEALTH: CURRENT SMOKER: 1

## 2025-02-05 NOTE — ANESTHESIA PREPROCEDURE EVALUATION
Patient: Chris Rivas    Procedure Information       Date/Time: 02/06/25 7630    Procedure: Bilateral Lumbar 4-5 Laminectomy; Lumbar 5 - Sacral 1 Left Hemilaminectomy (Spine Lumbar) - microscope, Flat plate XRs,Curved/straight curettes; Johnnie retractor, Flat Glenn with spine posts and sling    [facility]: [facility] / [facility]    Surgeons: Crow WHIPPLE MD            Relevant Problems   Cardiac   (+) Pure hypercholesterolemia      Pulmonary   (+) Chronic obstructive pulmonary disease (Multi)   (+) NIKHIL (obstructive sleep apnea)      Neuro   (+) Lumbar radiculopathy   (+) Neuropathy involving both lower extremities   (+) Panic attack      GI   (+) Esophageal dysphagia      Musculoskeletal   (+) Lumbar stenosis with neurogenic claudication      Tobacco   (+) Cigarette smoker     Past Surgical History:   Procedure Laterality Date   • APPENDECTOMY  08/08/2014   • BACK SURGERY  12/16/23   • JOINT REPLACEMENT Left     ELBOW   • JOINT REPLACEMENT Right     ANKLE   • LUNG SURGERY Right 2014    Partial lung lobectomy   • OTHER SURGICAL HISTORY  12/2023    Lumbar spine nerve ablation         Clinical information reviewed:   Tobacco  Allergies  Meds  Problems  Med Hx  Surg Hx   Fam Hx          NPO Detail:  No data recorded     PHYSICAL EXAM    Anesthesia Plan    History of general anesthesia?: yes  History of complications of general anesthesia?: no    ASA 3     general   (GETA)  The patient is a current smoker.  Patient was previously instructed to abstain from smoking on day of procedure.  Patient did not smoke on day of procedure.  Education provided regarding risk of obstructive sleep apnea.  intravenous induction   Postoperative administration of opioids is intended.  Anesthetic plan and risks discussed with patient.

## 2025-02-06 ENCOUNTER — APPOINTMENT (OUTPATIENT)
Dept: RADIOLOGY | Facility: HOSPITAL | Age: 55
End: 2025-02-06
Payer: COMMERCIAL

## 2025-02-06 ENCOUNTER — ANESTHESIA (OUTPATIENT)
Dept: OPERATING ROOM | Facility: HOSPITAL | Age: 55
End: 2025-02-06
Payer: COMMERCIAL

## 2025-02-06 ENCOUNTER — HOSPITAL ENCOUNTER (OUTPATIENT)
Facility: HOSPITAL | Age: 55
Discharge: HOME | End: 2025-02-07
Attending: STUDENT IN AN ORGANIZED HEALTH CARE EDUCATION/TRAINING PROGRAM | Admitting: STUDENT IN AN ORGANIZED HEALTH CARE EDUCATION/TRAINING PROGRAM
Payer: COMMERCIAL

## 2025-02-06 DIAGNOSIS — M48.062 LUMBAR STENOSIS WITH NEUROGENIC CLAUDICATION: ICD-10-CM

## 2025-02-06 DIAGNOSIS — M54.16 LUMBAR RADICULOPATHY: Primary | ICD-10-CM

## 2025-02-06 DIAGNOSIS — M48.061 DEGENERATIVE LUMBAR SPINAL STENOSIS: ICD-10-CM

## 2025-02-06 PROBLEM — Z98.890 S/P LAMINECTOMY: Status: ACTIVE | Noted: 2025-02-06

## 2025-02-06 PROCEDURE — 63030 LAMOT DCMPRN NRV RT 1 LMBR: CPT | Performed by: STUDENT IN AN ORGANIZED HEALTH CARE EDUCATION/TRAINING PROGRAM

## 2025-02-06 PROCEDURE — 97165 OT EVAL LOW COMPLEX 30 MIN: CPT | Mod: GO

## 2025-02-06 PROCEDURE — 3600000004 HC OR TIME - INITIAL BASE CHARGE - PROCEDURE LEVEL FOUR: Performed by: STUDENT IN AN ORGANIZED HEALTH CARE EDUCATION/TRAINING PROGRAM

## 2025-02-06 PROCEDURE — 2500000005 HC RX 250 GENERAL PHARMACY W/O HCPCS: Performed by: ANESTHESIOLOGY

## 2025-02-06 PROCEDURE — 63047 LAM FACETEC & FORAMOT LUMBAR: CPT | Performed by: STUDENT IN AN ORGANIZED HEALTH CARE EDUCATION/TRAINING PROGRAM

## 2025-02-06 PROCEDURE — 9420000001 HC RT PATIENT EDUCATION 5 MIN

## 2025-02-06 PROCEDURE — 3700000001 HC GENERAL ANESTHESIA TIME - INITIAL BASE CHARGE: Performed by: STUDENT IN AN ORGANIZED HEALTH CARE EDUCATION/TRAINING PROGRAM

## 2025-02-06 PROCEDURE — 2500000004 HC RX 250 GENERAL PHARMACY W/ HCPCS (ALT 636 FOR OP/ED): Mod: JW | Performed by: ANESTHESIOLOGY

## 2025-02-06 PROCEDURE — A4649 SURGICAL SUPPLIES: HCPCS | Performed by: STUDENT IN AN ORGANIZED HEALTH CARE EDUCATION/TRAINING PROGRAM

## 2025-02-06 PROCEDURE — 99221 1ST HOSP IP/OBS SF/LOW 40: CPT | Performed by: NURSE PRACTITIONER

## 2025-02-06 PROCEDURE — 3600000009 HC OR TIME - EACH INCREMENTAL 1 MINUTE - PROCEDURE LEVEL FOUR: Performed by: STUDENT IN AN ORGANIZED HEALTH CARE EDUCATION/TRAINING PROGRAM

## 2025-02-06 PROCEDURE — 2500000004 HC RX 250 GENERAL PHARMACY W/ HCPCS (ALT 636 FOR OP/ED): Performed by: STUDENT IN AN ORGANIZED HEALTH CARE EDUCATION/TRAINING PROGRAM

## 2025-02-06 PROCEDURE — 2720000007 HC OR 272 NO HCPCS: Performed by: STUDENT IN AN ORGANIZED HEALTH CARE EDUCATION/TRAINING PROGRAM

## 2025-02-06 PROCEDURE — 97162 PT EVAL MOD COMPLEX 30 MIN: CPT | Mod: GP

## 2025-02-06 PROCEDURE — 72020 X-RAY EXAM OF SPINE 1 VIEW: CPT

## 2025-02-06 PROCEDURE — A63047 PR LAMINEC/FACETECT/FORAMIN,LUMBAR 1 SEG: Performed by: ANESTHESIOLOGY

## 2025-02-06 PROCEDURE — 7100000011 HC EXTENDED STAY RECOVERY HOURLY - NURSING UNIT

## 2025-02-06 PROCEDURE — 2500000001 HC RX 250 WO HCPCS SELF ADMINISTERED DRUGS (ALT 637 FOR MEDICARE OP): Performed by: STUDENT IN AN ORGANIZED HEALTH CARE EDUCATION/TRAINING PROGRAM

## 2025-02-06 PROCEDURE — 3700000002 HC GENERAL ANESTHESIA TIME - EACH INCREMENTAL 1 MINUTE: Performed by: STUDENT IN AN ORGANIZED HEALTH CARE EDUCATION/TRAINING PROGRAM

## 2025-02-06 PROCEDURE — 2500000004 HC RX 250 GENERAL PHARMACY W/ HCPCS (ALT 636 FOR OP/ED): Performed by: ANESTHESIOLOGY

## 2025-02-06 PROCEDURE — 7100000002 HC RECOVERY ROOM TIME - EACH INCREMENTAL 1 MINUTE: Performed by: STUDENT IN AN ORGANIZED HEALTH CARE EDUCATION/TRAINING PROGRAM

## 2025-02-06 PROCEDURE — 2500000005 HC RX 250 GENERAL PHARMACY W/O HCPCS: Performed by: STUDENT IN AN ORGANIZED HEALTH CARE EDUCATION/TRAINING PROGRAM

## 2025-02-06 PROCEDURE — 7100000001 HC RECOVERY ROOM TIME - INITIAL BASE CHARGE: Performed by: STUDENT IN AN ORGANIZED HEALTH CARE EDUCATION/TRAINING PROGRAM

## 2025-02-06 DEVICE — UNDYED BRAIDED (POLYGLACTIN 910), SYNTHETIC ABSORBABLE SUTURE
Type: IMPLANTABLE DEVICE | Site: BACK | Status: FUNCTIONAL
Brand: COATED VICRYL

## 2025-02-06 RX ORDER — MIDAZOLAM HYDROCHLORIDE 1 MG/ML
1 INJECTION, SOLUTION INTRAMUSCULAR; INTRAVENOUS ONCE AS NEEDED
Status: DISCONTINUED | OUTPATIENT
Start: 2025-02-06 | End: 2025-02-06 | Stop reason: HOSPADM

## 2025-02-06 RX ORDER — TRANEXAMIC ACID 100 MG/ML
INJECTION, SOLUTION INTRAVENOUS AS NEEDED
Status: DISCONTINUED | OUTPATIENT
Start: 2025-02-06 | End: 2025-02-06

## 2025-02-06 RX ORDER — SODIUM CHLORIDE, SODIUM LACTATE, POTASSIUM CHLORIDE, CALCIUM CHLORIDE 600; 310; 30; 20 MG/100ML; MG/100ML; MG/100ML; MG/100ML
100 INJECTION, SOLUTION INTRAVENOUS CONTINUOUS
Status: ACTIVE | OUTPATIENT
Start: 2025-02-06 | End: 2025-02-07

## 2025-02-06 RX ORDER — MICONAZOLE NITRATE 2 %
2 CREAM (GRAM) TOPICAL
Status: DISCONTINUED | OUTPATIENT
Start: 2025-02-06 | End: 2025-02-07 | Stop reason: HOSPADM

## 2025-02-06 RX ORDER — ALBUTEROL SULFATE 0.83 MG/ML
2.5 SOLUTION RESPIRATORY (INHALATION) ONCE AS NEEDED
Status: DISCONTINUED | OUTPATIENT
Start: 2025-02-06 | End: 2025-02-06 | Stop reason: HOSPADM

## 2025-02-06 RX ORDER — CEFAZOLIN SODIUM 2 G/100ML
2 INJECTION, SOLUTION INTRAVENOUS ONCE
Status: COMPLETED | OUTPATIENT
Start: 2025-02-06 | End: 2025-02-06

## 2025-02-06 RX ORDER — CEFAZOLIN SODIUM 2 G/100ML
2 INJECTION, SOLUTION INTRAVENOUS EVERY 8 HOURS
Status: COMPLETED | OUTPATIENT
Start: 2025-02-06 | End: 2025-02-07

## 2025-02-06 RX ORDER — NALOXONE HYDROCHLORIDE 0.4 MG/ML
0.2 INJECTION, SOLUTION INTRAMUSCULAR; INTRAVENOUS; SUBCUTANEOUS EVERY 5 MIN PRN
Status: DISCONTINUED | OUTPATIENT
Start: 2025-02-06 | End: 2025-02-07 | Stop reason: HOSPADM

## 2025-02-06 RX ORDER — PROPOFOL 10 MG/ML
INJECTION, EMULSION INTRAVENOUS AS NEEDED
Status: DISCONTINUED | OUTPATIENT
Start: 2025-02-06 | End: 2025-02-06

## 2025-02-06 RX ORDER — GABAPENTIN 300 MG/1
600 CAPSULE ORAL ONCE
Status: COMPLETED | OUTPATIENT
Start: 2025-02-06 | End: 2025-02-06

## 2025-02-06 RX ORDER — PROCHLORPERAZINE MALEATE 10 MG
10 TABLET ORAL EVERY 6 HOURS PRN
Status: DISCONTINUED | OUTPATIENT
Start: 2025-02-06 | End: 2025-02-07 | Stop reason: HOSPADM

## 2025-02-06 RX ORDER — GABAPENTIN 600 MG/1
600 TABLET ORAL 3 TIMES DAILY
Status: DISCONTINUED | OUTPATIENT
Start: 2025-02-06 | End: 2025-02-07 | Stop reason: HOSPADM

## 2025-02-06 RX ORDER — PROCHLORPERAZINE EDISYLATE 5 MG/ML
10 INJECTION INTRAMUSCULAR; INTRAVENOUS EVERY 6 HOURS PRN
Status: DISCONTINUED | OUTPATIENT
Start: 2025-02-06 | End: 2025-02-07 | Stop reason: HOSPADM

## 2025-02-06 RX ORDER — FENTANYL CITRATE 50 UG/ML
50 INJECTION, SOLUTION INTRAMUSCULAR; INTRAVENOUS EVERY 5 MIN PRN
Status: DISCONTINUED | OUTPATIENT
Start: 2025-02-06 | End: 2025-02-06 | Stop reason: HOSPADM

## 2025-02-06 RX ORDER — ROCURONIUM BROMIDE 10 MG/ML
INJECTION, SOLUTION INTRAVENOUS AS NEEDED
Status: DISCONTINUED | OUTPATIENT
Start: 2025-02-06 | End: 2025-02-06

## 2025-02-06 RX ORDER — HYDROCODONE BITARTRATE AND ACETAMINOPHEN 10; 325 MG/1; MG/1
1 TABLET ORAL EVERY 4 HOURS PRN
Status: DISCONTINUED | OUTPATIENT
Start: 2025-02-06 | End: 2025-02-07 | Stop reason: HOSPADM

## 2025-02-06 RX ORDER — DEXAMETHASONE SODIUM PHOSPHATE 10 MG/ML
10 INJECTION INTRAMUSCULAR; INTRAVENOUS EVERY 8 HOURS SCHEDULED
Status: COMPLETED | OUTPATIENT
Start: 2025-02-06 | End: 2025-02-07

## 2025-02-06 RX ORDER — LIDOCAINE HYDROCHLORIDE 10 MG/ML
0.1 INJECTION, SOLUTION INFILTRATION; PERINEURAL ONCE
Status: DISCONTINUED | OUTPATIENT
Start: 2025-02-06 | End: 2025-02-06 | Stop reason: HOSPADM

## 2025-02-06 RX ORDER — ALBUTEROL SULFATE 0.83 MG/ML
2.5 SOLUTION RESPIRATORY (INHALATION) EVERY 4 HOURS PRN
Status: DISCONTINUED | OUTPATIENT
Start: 2025-02-06 | End: 2025-02-07 | Stop reason: HOSPADM

## 2025-02-06 RX ORDER — ALBUTEROL SULFATE 90 UG/1
2 INHALANT RESPIRATORY (INHALATION) EVERY 4 HOURS PRN
Status: DISCONTINUED | OUTPATIENT
Start: 2025-02-06 | End: 2025-02-06 | Stop reason: CLARIF

## 2025-02-06 RX ORDER — CHLORHEXIDINE GLUCONATE ORAL RINSE 1.2 MG/ML
15 SOLUTION DENTAL AS NEEDED
COMMUNITY
End: 2025-02-07 | Stop reason: HOSPADM

## 2025-02-06 RX ORDER — PAROXETINE 10 MG/1
10 TABLET, FILM COATED ORAL NIGHTLY
Status: DISCONTINUED | OUTPATIENT
Start: 2025-02-06 | End: 2025-02-07 | Stop reason: HOSPADM

## 2025-02-06 RX ORDER — BISACODYL 5 MG
10 TABLET, DELAYED RELEASE (ENTERIC COATED) ORAL DAILY PRN
Status: DISCONTINUED | OUTPATIENT
Start: 2025-02-06 | End: 2025-02-07 | Stop reason: HOSPADM

## 2025-02-06 RX ORDER — ONDANSETRON HYDROCHLORIDE 2 MG/ML
INJECTION, SOLUTION INTRAVENOUS AS NEEDED
Status: DISCONTINUED | OUTPATIENT
Start: 2025-02-06 | End: 2025-02-06

## 2025-02-06 RX ORDER — ONDANSETRON 4 MG/1
4 TABLET, ORALLY DISINTEGRATING ORAL EVERY 8 HOURS PRN
Status: DISCONTINUED | OUTPATIENT
Start: 2025-02-06 | End: 2025-02-07 | Stop reason: HOSPADM

## 2025-02-06 RX ORDER — HYDROCODONE BITARTRATE AND ACETAMINOPHEN 5; 325 MG/1; MG/1
1 TABLET ORAL EVERY 4 HOURS PRN
Status: DISCONTINUED | OUTPATIENT
Start: 2025-02-06 | End: 2025-02-07 | Stop reason: HOSPADM

## 2025-02-06 RX ORDER — ACETAMINOPHEN 325 MG/1
975 TABLET ORAL ONCE
Status: COMPLETED | OUTPATIENT
Start: 2025-02-06 | End: 2025-02-06

## 2025-02-06 RX ORDER — ONDANSETRON HYDROCHLORIDE 2 MG/ML
4 INJECTION, SOLUTION INTRAVENOUS ONCE AS NEEDED
Status: DISCONTINUED | OUTPATIENT
Start: 2025-02-06 | End: 2025-02-06 | Stop reason: HOSPADM

## 2025-02-06 RX ORDER — SODIUM CHLORIDE, SODIUM LACTATE, POTASSIUM CHLORIDE, CALCIUM CHLORIDE 600; 310; 30; 20 MG/100ML; MG/100ML; MG/100ML; MG/100ML
100 INJECTION, SOLUTION INTRAVENOUS CONTINUOUS
Status: DISCONTINUED | OUTPATIENT
Start: 2025-02-06 | End: 2025-02-06 | Stop reason: HOSPADM

## 2025-02-06 RX ORDER — HYDRALAZINE HYDROCHLORIDE 20 MG/ML
5 INJECTION INTRAMUSCULAR; INTRAVENOUS EVERY 30 MIN PRN
Status: DISCONTINUED | OUTPATIENT
Start: 2025-02-06 | End: 2025-02-06 | Stop reason: HOSPADM

## 2025-02-06 RX ORDER — FENTANYL CITRATE 50 UG/ML
INJECTION, SOLUTION INTRAMUSCULAR; INTRAVENOUS AS NEEDED
Status: DISCONTINUED | OUTPATIENT
Start: 2025-02-06 | End: 2025-02-06

## 2025-02-06 RX ORDER — PROCHLORPERAZINE 25 MG/1
25 SUPPOSITORY RECTAL EVERY 12 HOURS PRN
Status: DISCONTINUED | OUTPATIENT
Start: 2025-02-06 | End: 2025-02-07 | Stop reason: HOSPADM

## 2025-02-06 RX ORDER — DEXTROSE 50 % IN WATER (D50W) INTRAVENOUS SYRINGE
25
Status: DISCONTINUED | OUTPATIENT
Start: 2025-02-06 | End: 2025-02-07 | Stop reason: HOSPADM

## 2025-02-06 RX ORDER — MIDAZOLAM HYDROCHLORIDE 1 MG/ML
INJECTION, SOLUTION INTRAMUSCULAR; INTRAVENOUS AS NEEDED
Status: DISCONTINUED | OUTPATIENT
Start: 2025-02-06 | End: 2025-02-06

## 2025-02-06 RX ORDER — POLYETHYLENE GLYCOL 3350 17 G/17G
17 POWDER, FOR SOLUTION ORAL DAILY
Status: DISCONTINUED | OUTPATIENT
Start: 2025-02-06 | End: 2025-02-07 | Stop reason: HOSPADM

## 2025-02-06 RX ORDER — GLYCOPYRROLATE 0.2 MG/ML
INJECTION INTRAMUSCULAR; INTRAVENOUS AS NEEDED
Status: DISCONTINUED | OUTPATIENT
Start: 2025-02-06 | End: 2025-02-06

## 2025-02-06 RX ORDER — DEXTROSE 50 % IN WATER (D50W) INTRAVENOUS SYRINGE
12.5
Status: DISCONTINUED | OUTPATIENT
Start: 2025-02-06 | End: 2025-02-07 | Stop reason: HOSPADM

## 2025-02-06 RX ORDER — MEPERIDINE HYDROCHLORIDE 25 MG/ML
12.5 INJECTION INTRAMUSCULAR; INTRAVENOUS; SUBCUTANEOUS EVERY 10 MIN PRN
Status: DISCONTINUED | OUTPATIENT
Start: 2025-02-06 | End: 2025-02-06 | Stop reason: HOSPADM

## 2025-02-06 RX ORDER — METHOCARBAMOL 500 MG/1
1000 TABLET, FILM COATED ORAL 3 TIMES DAILY
Status: DISCONTINUED | OUTPATIENT
Start: 2025-02-06 | End: 2025-02-07 | Stop reason: HOSPADM

## 2025-02-06 RX ORDER — ONDANSETRON HYDROCHLORIDE 2 MG/ML
4 INJECTION, SOLUTION INTRAVENOUS EVERY 8 HOURS PRN
Status: DISCONTINUED | OUTPATIENT
Start: 2025-02-06 | End: 2025-02-07 | Stop reason: HOSPADM

## 2025-02-06 RX ORDER — SODIUM CHLORIDE, SODIUM LACTATE, POTASSIUM CHLORIDE, CALCIUM CHLORIDE 600; 310; 30; 20 MG/100ML; MG/100ML; MG/100ML; MG/100ML
INJECTION, SOLUTION INTRAVENOUS CONTINUOUS PRN
Status: DISCONTINUED | OUTPATIENT
Start: 2025-02-06 | End: 2025-02-06

## 2025-02-06 RX ORDER — VANCOMYCIN HYDROCHLORIDE 1 G/20ML
INJECTION, POWDER, LYOPHILIZED, FOR SOLUTION INTRAVENOUS AS NEEDED
Status: DISCONTINUED | OUTPATIENT
Start: 2025-02-06 | End: 2025-02-06 | Stop reason: HOSPADM

## 2025-02-06 RX ORDER — KETOROLAC TROMETHAMINE 30 MG/ML
15 INJECTION, SOLUTION INTRAMUSCULAR; INTRAVENOUS EVERY 6 HOURS SCHEDULED
Status: DISCONTINUED | OUTPATIENT
Start: 2025-02-06 | End: 2025-02-07 | Stop reason: HOSPADM

## 2025-02-06 RX ADMIN — KETOROLAC TROMETHAMINE 15 MG: 30 INJECTION, SOLUTION INTRAMUSCULAR; INTRAVENOUS at 23:55

## 2025-02-06 RX ADMIN — CEFAZOLIN SODIUM 2 G: 2 INJECTION, SOLUTION INTRAVENOUS at 08:15

## 2025-02-06 RX ADMIN — METHOCARBAMOL 1000 MG: 500 TABLET ORAL at 20:26

## 2025-02-06 RX ADMIN — ONDANSETRON HYDROCHLORIDE 4 MG: 2 INJECTION INTRAMUSCULAR; INTRAVENOUS at 08:24

## 2025-02-06 RX ADMIN — FENTANYL CITRATE 50 MCG: 0.05 INJECTION, SOLUTION INTRAMUSCULAR; INTRAVENOUS at 10:13

## 2025-02-06 RX ADMIN — SODIUM CHLORIDE, POTASSIUM CHLORIDE, SODIUM LACTATE AND CALCIUM CHLORIDE: 600; 310; 30; 20 INJECTION, SOLUTION INTRAVENOUS at 07:50

## 2025-02-06 RX ADMIN — POVIDONE-IODINE 1 APPLICATION: 5 SOLUTION TOPICAL at 07:00

## 2025-02-06 RX ADMIN — DEXAMETHASONE SODIUM PHOSPHATE 10 MG: 10 INJECTION INTRAMUSCULAR; INTRAVENOUS at 21:23

## 2025-02-06 RX ADMIN — PROPOFOL 150 MG: 10 INJECTION, EMULSION INTRAVENOUS at 07:54

## 2025-02-06 RX ADMIN — ROCURONIUM BROMIDE 50 MG: 10 INJECTION, SOLUTION INTRAVENOUS at 07:54

## 2025-02-06 RX ADMIN — CEFAZOLIN SODIUM 2 G: 2 INJECTION, SOLUTION INTRAVENOUS at 23:55

## 2025-02-06 RX ADMIN — ACETAMINOPHEN 975 MG: 325 TABLET ORAL at 06:59

## 2025-02-06 RX ADMIN — GLYCOPYRROLATE 0.4 MG: 0.2 INJECTION INTRAMUSCULAR; INTRAVENOUS at 08:44

## 2025-02-06 RX ADMIN — KETOROLAC TROMETHAMINE 15 MG: 30 INJECTION, SOLUTION INTRAMUSCULAR; INTRAVENOUS at 20:25

## 2025-02-06 RX ADMIN — FENTANYL CITRATE 50 MCG: 0.05 INJECTION, SOLUTION INTRAMUSCULAR; INTRAVENOUS at 10:37

## 2025-02-06 RX ADMIN — DEXAMETHASONE SODIUM PHOSPHATE 10 MG: 4 INJECTION, SOLUTION INTRAMUSCULAR; INTRAVENOUS at 07:54

## 2025-02-06 RX ADMIN — GABAPENTIN 600 MG: 600 TABLET, FILM COATED ORAL at 20:26

## 2025-02-06 RX ADMIN — FENTANYL CITRATE 50 MCG: 0.05 INJECTION, SOLUTION INTRAMUSCULAR; INTRAVENOUS at 07:54

## 2025-02-06 RX ADMIN — CEFAZOLIN SODIUM 2 G: 2 INJECTION, SOLUTION INTRAVENOUS at 15:17

## 2025-02-06 RX ADMIN — TRANEXAMIC ACID 1000 MG: 100 INJECTION, SOLUTION INTRAVENOUS at 07:54

## 2025-02-06 RX ADMIN — METHOCARBAMOL 1000 MG: 500 TABLET ORAL at 15:17

## 2025-02-06 RX ADMIN — HYDROMORPHONE HYDROCHLORIDE 0.4 MG: 1 INJECTION, SOLUTION INTRAMUSCULAR; INTRAVENOUS; SUBCUTANEOUS at 11:06

## 2025-02-06 RX ADMIN — HYDROCODONE BITARTRATE AND ACETAMINOPHEN 1 TABLET: 5; 325 TABLET ORAL at 15:17

## 2025-02-06 RX ADMIN — Medication 2 L/MIN: at 12:52

## 2025-02-06 RX ADMIN — HYDROMORPHONE HYDROCHLORIDE 0.4 MG: 1 INJECTION, SOLUTION INTRAMUSCULAR; INTRAVENOUS; SUBCUTANEOUS at 11:25

## 2025-02-06 RX ADMIN — MIDAZOLAM 2 MG: 1 INJECTION INTRAMUSCULAR; INTRAVENOUS at 07:54

## 2025-02-06 RX ADMIN — DEXAMETHASONE SODIUM PHOSPHATE 10 MG: 10 INJECTION INTRAMUSCULAR; INTRAVENOUS at 13:12

## 2025-02-06 RX ADMIN — GABAPENTIN 600 MG: 600 TABLET, FILM COATED ORAL at 15:17

## 2025-02-06 RX ADMIN — PAROXETINE HYDROCHLORIDE 10 MG: 10 TABLET, FILM COATED ORAL at 20:26

## 2025-02-06 RX ADMIN — HYDROMORPHONE HYDROCHLORIDE 0.4 MG: 1 INJECTION, SOLUTION INTRAMUSCULAR; INTRAVENOUS; SUBCUTANEOUS at 11:12

## 2025-02-06 RX ADMIN — KETOROLAC TROMETHAMINE 15 MG: 30 INJECTION, SOLUTION INTRAMUSCULAR; INTRAVENOUS at 13:13

## 2025-02-06 RX ADMIN — GABAPENTIN 600 MG: 600 TABLET, FILM COATED ORAL at 07:00

## 2025-02-06 RX ADMIN — SUGAMMADEX 200 MG: 100 INJECTION, SOLUTION INTRAVENOUS at 10:46

## 2025-02-06 RX ADMIN — SODIUM CHLORIDE, SODIUM LACTATE, POTASSIUM CHLORIDE, AND CALCIUM CHLORIDE 100 ML/HR: 600; 310; 30; 20 INJECTION, SOLUTION INTRAVENOUS at 12:52

## 2025-02-06 SDOH — SOCIAL STABILITY: SOCIAL INSECURITY
WITHIN THE LAST YEAR, HAVE YOU BEEN KICKED, HIT, SLAPPED, OR OTHERWISE PHYSICALLY HURT BY YOUR PARTNER OR EX-PARTNER?: NO

## 2025-02-06 SDOH — SOCIAL STABILITY: SOCIAL INSECURITY: DO YOU FEEL UNSAFE GOING BACK TO THE PLACE WHERE YOU ARE LIVING?: NO

## 2025-02-06 SDOH — SOCIAL STABILITY: SOCIAL INSECURITY: HAVE YOU HAD ANY THOUGHTS OF HARMING ANYONE ELSE?: NO

## 2025-02-06 SDOH — SOCIAL STABILITY: SOCIAL INSECURITY: WITHIN THE LAST YEAR, HAVE YOU BEEN HUMILIATED OR EMOTIONALLY ABUSED IN OTHER WAYS BY YOUR PARTNER OR EX-PARTNER?: NO

## 2025-02-06 SDOH — ECONOMIC STABILITY: FOOD INSECURITY: WITHIN THE PAST 12 MONTHS, YOU WORRIED THAT YOUR FOOD WOULD RUN OUT BEFORE YOU GOT THE MONEY TO BUY MORE.: NEVER TRUE

## 2025-02-06 SDOH — ECONOMIC STABILITY: INCOME INSECURITY: IN THE PAST 12 MONTHS HAS THE ELECTRIC, GAS, OIL, OR WATER COMPANY THREATENED TO SHUT OFF SERVICES IN YOUR HOME?: NO

## 2025-02-06 SDOH — SOCIAL STABILITY: SOCIAL INSECURITY: HAVE YOU HAD THOUGHTS OF HARMING ANYONE ELSE?: NO

## 2025-02-06 SDOH — SOCIAL STABILITY: SOCIAL INSECURITY: ABUSE: ADULT

## 2025-02-06 SDOH — SOCIAL STABILITY: SOCIAL INSECURITY: ARE THERE ANY APPARENT SIGNS OF INJURIES/BEHAVIORS THAT COULD BE RELATED TO ABUSE/NEGLECT?: NO

## 2025-02-06 SDOH — SOCIAL STABILITY: SOCIAL INSECURITY: ARE YOU OR HAVE YOU BEEN THREATENED OR ABUSED PHYSICALLY, EMOTIONALLY, OR SEXUALLY BY ANYONE?: NO

## 2025-02-06 SDOH — ECONOMIC STABILITY: FOOD INSECURITY: WITHIN THE PAST 12 MONTHS, THE FOOD YOU BOUGHT JUST DIDN'T LAST AND YOU DIDN'T HAVE MONEY TO GET MORE.: NEVER TRUE

## 2025-02-06 SDOH — SOCIAL STABILITY: SOCIAL INSECURITY: WERE YOU ABLE TO COMPLETE ALL THE BEHAVIORAL HEALTH SCREENINGS?: YES

## 2025-02-06 SDOH — SOCIAL STABILITY: SOCIAL INSECURITY
WITHIN THE LAST YEAR, HAVE YOU BEEN RAPED OR FORCED TO HAVE ANY KIND OF SEXUAL ACTIVITY BY YOUR PARTNER OR EX-PARTNER?: NO

## 2025-02-06 SDOH — SOCIAL STABILITY: SOCIAL INSECURITY: WITHIN THE LAST YEAR, HAVE YOU BEEN AFRAID OF YOUR PARTNER OR EX-PARTNER?: NO

## 2025-02-06 SDOH — SOCIAL STABILITY: SOCIAL INSECURITY: DO YOU FEEL ANYONE HAS EXPLOITED OR TAKEN ADVANTAGE OF YOU FINANCIALLY OR OF YOUR PERSONAL PROPERTY?: NO

## 2025-02-06 SDOH — SOCIAL STABILITY: SOCIAL INSECURITY: DOES ANYONE TRY TO KEEP YOU FROM HAVING/CONTACTING OTHER FRIENDS OR DOING THINGS OUTSIDE YOUR HOME?: NO

## 2025-02-06 SDOH — SOCIAL STABILITY: SOCIAL INSECURITY: HAS ANYONE EVER THREATENED TO HURT YOUR FAMILY OR YOUR PETS?: NO

## 2025-02-06 ASSESSMENT — PAIN SCALES - GENERAL
PAINLEVEL_OUTOF10: 4
PAINLEVEL_OUTOF10: 7
PAINLEVEL_OUTOF10: 0 - NO PAIN
PAINLEVEL_OUTOF10: 0 - NO PAIN
PAIN_LEVEL: 2
PAINLEVEL_OUTOF10: 4
PAINLEVEL_OUTOF10: 7
PAINLEVEL_OUTOF10: 6
PAINLEVEL_OUTOF10: 4
PAINLEVEL_OUTOF10: 7
PAINLEVEL_OUTOF10: 8
PAINLEVEL_OUTOF10: 7

## 2025-02-06 ASSESSMENT — ACTIVITIES OF DAILY LIVING (ADL)
ADL_ASSISTANCE: INDEPENDENT
DRESSING YOURSELF: INDEPENDENT
BATHING_ASSISTANCE: MAXIMAL
HEARING - RIGHT EAR: FUNCTIONAL
BATHING: INDEPENDENT
WALKS IN HOME: INDEPENDENT
ADL_ASSISTANCE: INDEPENDENT
ASSISTIVE_DEVICE: DENTURES UPPER
TOILETING: INDEPENDENT
FEEDING YOURSELF: INDEPENDENT
HEARING - LEFT EAR: FUNCTIONAL
JUDGMENT_ADEQUATE_SAFELY_COMPLETE_DAILY_ACTIVITIES: YES
PATIENT'S MEMORY ADEQUATE TO SAFELY COMPLETE DAILY ACTIVITIES?: YES
LACK_OF_TRANSPORTATION: NO
ADEQUATE_TO_COMPLETE_ADL: YES
GROOMING: INDEPENDENT

## 2025-02-06 ASSESSMENT — COGNITIVE AND FUNCTIONAL STATUS - GENERAL
MOVING TO AND FROM BED TO CHAIR: A LOT
WALKING IN HOSPITAL ROOM: TOTAL
STANDING UP FROM CHAIR USING ARMS: A LOT
MOVING FROM LYING ON BACK TO SITTING ON SIDE OF FLAT BED WITH BEDRAILS: A LITTLE
DRESSING REGULAR LOWER BODY CLOTHING: A LOT
MOVING TO AND FROM BED TO CHAIR: A LOT
WALKING IN HOSPITAL ROOM: A LOT
HELP NEEDED FOR BATHING: A LITTLE
TOILETING: A LITTLE
CLIMB 3 TO 5 STEPS WITH RAILING: A LOT
HELP NEEDED FOR BATHING: A LITTLE
DAILY ACTIVITIY SCORE: 15
MOBILITY SCORE: 15
EATING MEALS: A LITTLE
TURNING FROM BACK TO SIDE WHILE IN FLAT BAD: A LITTLE
MOVING FROM LYING ON BACK TO SITTING ON SIDE OF FLAT BED WITH BEDRAILS: A LITTLE
DRESSING REGULAR UPPER BODY CLOTHING: A LITTLE
CLIMB 3 TO 5 STEPS WITH RAILING: A LOT
WALKING IN HOSPITAL ROOM: A LOT
PERSONAL GROOMING: A LITTLE
PERSONAL GROOMING: A LITTLE
DRESSING REGULAR LOWER BODY CLOTHING: A LOT
DRESSING REGULAR LOWER BODY CLOTHING: A LOT
STANDING UP FROM CHAIR USING ARMS: A LITTLE
DAILY ACTIVITIY SCORE: 19
MOBILITY SCORE: 12
MOVING TO AND FROM BED TO CHAIR: A LOT
STANDING UP FROM CHAIR USING ARMS: A LITTLE
DRESSING REGULAR UPPER BODY CLOTHING: A LITTLE
DAILY ACTIVITIY SCORE: 18
DRESSING REGULAR UPPER BODY CLOTHING: A LITTLE
MOBILITY SCORE: 15
TOILETING: A LITTLE
TURNING FROM BACK TO SIDE WHILE IN FLAT BAD: A LITTLE
HELP NEEDED FOR BATHING: A LOT
TOILETING: A LOT
MOVING FROM LYING ON BACK TO SITTING ON SIDE OF FLAT BED WITH BEDRAILS: A LITTLE
CLIMB 3 TO 5 STEPS WITH RAILING: TOTAL
PATIENT BASELINE BEDBOUND: NO
TURNING FROM BACK TO SIDE WHILE IN FLAT BAD: A LITTLE

## 2025-02-06 ASSESSMENT — LIFESTYLE VARIABLES
PRESCIPTION_ABUSE_PAST_12_MONTHS: NO
HOW OFTEN DO YOU HAVE A DRINK CONTAINING ALCOHOL: MONTHLY OR LESS
SKIP TO QUESTIONS 9-10: 1
SUBSTANCE_ABUSE_PAST_12_MONTHS: NO
HOW OFTEN DO YOU HAVE 6 OR MORE DRINKS ON ONE OCCASION: NEVER
AUDIT-C TOTAL SCORE: 1
AUDIT-C TOTAL SCORE: 1
HOW MANY STANDARD DRINKS CONTAINING ALCOHOL DO YOU HAVE ON A TYPICAL DAY: 1 OR 2

## 2025-02-06 ASSESSMENT — PAIN DESCRIPTION - DESCRIPTORS
DESCRIPTORS: ACHING;DULL
DESCRIPTORS: ACHING;SHARP
DESCRIPTORS: ACHING;DULL
DESCRIPTORS: ACHING;SHARP
DESCRIPTORS: ACHING;TINGLING;NUMBNESS
DESCRIPTORS: ACHING;SHARP

## 2025-02-06 ASSESSMENT — PAIN - FUNCTIONAL ASSESSMENT
PAIN_FUNCTIONAL_ASSESSMENT: 0-10

## 2025-02-06 ASSESSMENT — PATIENT HEALTH QUESTIONNAIRE - PHQ9
SUM OF ALL RESPONSES TO PHQ9 QUESTIONS 1 & 2: 0
1. LITTLE INTEREST OR PLEASURE IN DOING THINGS: NOT AT ALL
2. FEELING DOWN, DEPRESSED OR HOPELESS: NOT AT ALL

## 2025-02-06 ASSESSMENT — PAIN DESCRIPTION - LOCATION
LOCATION: BACK
LOCATION: BACK

## 2025-02-06 ASSESSMENT — PAIN DESCRIPTION - ORIENTATION
ORIENTATION: MID;LOWER
ORIENTATION: LOWER

## 2025-02-06 NOTE — BRIEF OP NOTE
Date: 2025  OR Location: [facility]    Name: Chris Rivas, : 1970, Age: 54 y.o., MRN: [id number], Sex: male    Diagnosis  Pre-op Diagnosis      * Lumbar radiculopathy [M54.16]     * Lumbar stenosis with neurogenic claudication [M48.062] Post-op Diagnosis     * Lumbar radiculopathy [M54.16]     * Lumbar stenosis with neurogenic claudication [M48.062]     Procedures  Bilateral Lumbar 4-5 Laminectomy; Lumbar 5 - Sacral 1 Left Hemilaminectomy  42327 - CO MIRANDA FACETECTOMY & FORAMOTOMY 1 VRT SGM LUMBAR    Bilateral Lumbar 4-5 Laminectomy; Lumbar 5 - Sacral 1 Left Hemilaminectomy  48795 - CO LAMNOTMY INCL W/DCMPRSN NRV ROOT 1 INTRSPC LUMBR      Surgeons      * Crow Wilkins V - Primary    Resident/Fellow/Other Assistant:  Surgeons and Role:     * Shyanne Muhammad - Assisting    Staff:   Circulator: Jose Amaya Person: Eliana Waterman Circulator: Micheline    Anesthesia Staff: Anesthesiologist: Chandrakant Copeland MD    Procedure Summary  Anesthesia: General  ASA: III  Estimated Blood Loss: 20 mL  Intra-op Medications:   Administrations occurring from 0730 to 1040 on 25:   Medication Name Total Dose   vancomycin (Vancocin) vial for injection 2 g   dexAMETHasone (Decadron) 4 mg/mL 10 mg   fentaNYL PF 0.05 mg/mL 100 mcg   glycopyrrolate (Robinul) injection 0.4 mg   LR infusion 1,906.67 mL   midazolam (Versed) 1 mg/1 mL 2 mg   ondansetron 2 mg/mL 4 mg   propofol (Diprivan) injection 10 mg/mL 150 mg   rocuronium (ZeMuron) 50 mg/5 mL injection 50 mg   tranexamic acid injection 100 mg/mL 1,000 mg   ceFAZolin (Ancef) 2 g in dextrose (iso)  mL 2 g              Anesthesia Record               Intraprocedure I/O Totals          Intake    Tranexamic Acid 0.00 mL    The total shown is the total volume documented since Anesthesia Start was filed.    Total Intake 0 mL          Specimen: No specimens collected               Findings: Severe lateral recess stenosis at L4-5 with moderate central stenosis from ligamentum flavum  hypertrophy and spondylosis; left L5-S1 lateral recess stenosis from ligamentum flavum hypertrophy and spondylosis    Complications:  None; patient tolerated the procedure well.     Disposition: PACU - hemodynamically stable.  Condition: stable  Specimens Collected: No specimens collected  Attending Attestation: I was present and scrubbed for the entire procedure., save for superficial closure.     Crow Wilkins V  Phone Number: [phone]

## 2025-02-06 NOTE — CARE PLAN
The patient's goals for the shift include  manage pain, therapy, eat, comfort and safety, rest, discharge planning.     The clinical goals for the shift include Pain control, monitor labs and VS, IV fluids and antibx, PT/OT, maintain safety, no falls, promote rest, discharge planning.     No barriers toward meeting these goals. Patient currently sitting up in chair, eating a late lunch. Assisted back to bed x2 staff assist to rest. Call light and personal belongings within reach, fall risk interventions in place. Plan of care ongoing, no additional needs at this time.       Problem: Pain - Adult  Goal: Verbalizes/displays adequate comfort level or baseline comfort level  Outcome: Progressing     Problem: Safety - Adult  Goal: Free from fall injury  Outcome: Progressing     Problem: Discharge Planning  Goal: Discharge to home or other facility with appropriate resources  Outcome: Progressing     Problem: Chronic Conditions and Co-morbidities  Goal: Patient's chronic conditions and co-morbidity symptoms are monitored and maintained or improved  Outcome: Progressing     Problem: Nutrition  Goal: Nutrient intake appropriate for maintaining nutritional needs  Outcome: Progressing     Problem: Fall/Injury  Goal: Not fall by end of shift  Outcome: Progressing  Goal: Be free from injury by end of the shift  Outcome: Progressing  Goal: Verbalize understanding of personal risk factors for fall in the hospital  Outcome: Progressing  Goal: Verbalize understanding of risk factor reduction measures to prevent injury from fall in the home  Outcome: Progressing  Goal: Use assistive devices by end of the shift  Outcome: Progressing  Goal: Pace activities to prevent fatigue by end of the shift  Outcome: Progressing     Problem: Pain  Goal: Takes deep breaths with improved pain control throughout the shift  Outcome: Progressing  Goal: Turns in bed with improved pain control throughout the shift  Outcome: Progressing  Goal: Walks with  improved pain control throughout the shift  Outcome: Progressing  Goal: Performs ADL's with improved pain control throughout shift  Outcome: Progressing  Goal: Participates in PT with improved pain control throughout the shift  Outcome: Progressing  Goal: Free from opioid side effects throughout the shift  Outcome: Progressing  Goal: Free from acute confusion related to pain meds throughout the shift  Outcome: Progressing

## 2025-02-06 NOTE — PROGRESS NOTES
Physical Therapy Evaluation    Patient Name: Chris Rivas  MRN: [id number]  Department: [address]  Room: [address] [place] [address]  Today's Date: 2/6/2025   Time Calculation  Start Time: 1331  Stop Time: 1344  Time Calculation (min): 13 min    Assessment/Plan   PT Assessment  PT Assessment Results: Decreased strength, Decreased endurance, Impaired balance, Decreased cognition, Decreased coordination, Decreased mobility, Decreased safety awareness, Impaired judgement, Impaired sensation, Impaired tone, Decreased skin integrity, Orthopedic restrictions, Pain  Rehab Prognosis: Good  Barriers to Discharge Home: Physical needs  Physical Needs: 24hr mobility assistance needed, 24hr ADL assistance needed  Evaluation/Treatment Tolerance: Patient tolerated treatment well  Medical Staff Made Aware: Yes  Strengths: Premorbid level of function  Barriers to Participation: Comorbidities  End of Session Communication: Bedside nurse  Assessment Comment: 54 year old male admits sp L4/L5 Laminectomy completed by Dr. Wilkins. On eval, he demonstrates impaired safe mobility warranting skilled PT care. PT will hold off on making DC recommendations until after follow up tomorrow as functional abilitiy today is certainly impacted by immediate post op Sx.  End of Session Patient Position: Up in chair, Alarm on  IP OR SWING BED PT PLAN  Inpatient or Swing Bed: Inpatient  PT Plan  Treatment/Interventions: Bed mobility, Transfer training, Gait training, Stair training, Balance training, Strengthening, Endurance training, Therapeutic exercise, Home exercise program, Therapeutic activity, Positioning  PT Plan: Ongoing PT  PT Frequency: BID  PT Discharge Recommendations:  (TBD 2/7 at AM follow up)  Equipment Recommended upon Discharge: Wheeled walker (may need walker to DC with pending 2/7 mobility)  PT Recommended Transfer Status: Assist x2, Assistive device  PT - OK to Discharge: Yes    Subjective   General Visit Information:  General  Reason for Referral: Recent  Surgery-L4/L5 Laminectomy  Referred By: Dr. Wilkins  Family/Caregiver Present: No  Co-Treatment: OT  Co-Treatment Reason: Safe Mobility post op  Prior to Session Communication: Bedside nurse  Patient Position Received: Alarm on, Bed, 4 rail up  Preferred Learning Style: verbal, visual  General Comment: 54 year old male admits sp L4/L5 Laminectomy completed by Dr. Wilkins.  Home Living:  Home Living  Type of Home: House  Lives With: Alone  Home Adaptive Equipment: None  Home Layout: One level  Home Access: Stairs to enter with rails  Entrance Stairs-Rails: Right  Entrance Stairs-Number of Steps: 3  Bathroom Shower/Tub: Tub/shower unit  Bathroom Equipment: Grab bars in shower  Prior Level of Function:  Prior Function Per Pt/Caregiver Report  Level of [place]: Independent with ADLs and functional transfers, Independent with homemaking with ambulation  Receives Help From: Friends (could assist intermttently)  ADL Assistance: Independent  Homemaking Assistance: Independent  Ambulatory Assistance: Independent (no AD)  Prior Function Comments: 2 falls/3months  Precautions:  Precautions  Medical Precautions: Fall precautions  Post-Surgical Precautions: Spinal precautions      Date/Time Vitals Session Patient Position Pulse Resp SpO2 BP MAP (mmHg)    02/06/25 1200 --  --  68  11  97 %  99/63  73     02/06/25 1215 --  --  66  26  99 %  102/65  77     02/06/25 1248 --  --  56  20  100 %  102/66  78                 Objective   Pain:  Pain Assessment  Pain Assessment: 0-10  0-10 (Numeric) Pain Score: 7  Pain Type: Surgical pain (rn aware)  Pain Location: Back  Pain Orientation: Lower  Pain Interventions: Ambulation/increased activity  Response to Interventions: No change in pain  Cognition:  Cognition  Overall Cognitive Status: Within Functional Limits  Orientation Level: Oriented X4    General Assessments:  Activity Tolerance  Endurance: Decreased tolerance for upright activites    Sensation  Light Touch:  (reports chronic R  foot and R hip numbness)    Strength  Strength Comments: Grossly 4-/5 BLEs on MMT  Functional Assessments:  Bed Mobility  Bed Mobility: Yes  Bed Mobility 1  Bed Mobility 1: Supine to sitting, Log roll, Rolling right  Level of Assistance 1: Minimum assistance  Bed Mobility Comments 1: assist to safely sequence log roll, bring BLEs off EOB, and bring trunk up. Cues on technique given    Transfers  Transfer: Yes  Transfer 1  Transfer From 1: Bed to  Transfer to 1: Stand  Technique 1: Sit to stand, Stand to sit  Transfer Level of Assistance 1:  (Min+Mod A)  Trials/Comments 1: L knee buckled from Pt requiring Mod A x1 to block and upright. Additional Min A to steady at all times    Ambulation/Gait Training  Ambulation/Gait Training Performed: Yes  Ambulation/Gait Training 1  Surface 1: Level tile  Device 1: No device  Assistance 1: Minimum assistance (x2)  Comments/Distance (ft) 1: During standing, Pt able to take sideways shuffled steps along EOB (~3') and pivot to bedside chair with Min A x2 to steady throughout, Very soft knees    Outcome Measures:  [facility] Basic Mobility  Turning from your back to your side while in a flat bed without using bedrails: A little  Moving from lying on your back to sitting on the side of a flat bed without using bedrails: A little  Moving to and from bed to chair (including a wheelchair): A lot  Standing up from a chair using your arms (e.g. wheelchair or bedside chair): A lot  To walk in hospital room: Total  Climbing 3-5 steps with railing: Total  Basic Mobility - Total Score: 12    Encounter Problems       Encounter Problems (Active)       Balance       Sitting Balance (Progressing)       Start:  02/06/25    Expected End:  02/20/25       Pt will demonstrate good sitting balance to promote safe engagement with out of bed activities           Standing Balance (Progressing)       Start:  02/06/25    Expected End:  02/20/25       Pt will demonstrate good static standing balance to promote  safe participation with out of bed activity, transfers, and mobility              Mobility       Ambulation (Progressing)       Start:  02/06/25    Expected End:  02/20/25       Pt will ambulate 50' modified independent assist with LRD to promote safe home mobility           Entry Stair Negotiation (Progressing)       Start:  02/06/25    Expected End:  02/20/25       Pt will ascend/descend 3 stairs with rail(s) on R and modified independent assist to promote safe entry and exit in home environment                PT Transfers       Supine to sit via log roll (Progressing)       Start:  02/06/25    Expected End:  02/20/25       Pt will transfer supine to sitting at edge of bed with modified independent assist to promote acute care out of bed activity           Sit to stand (Progressing)       Start:  02/06/25    Expected End:  02/20/25       Pt will transfer sit to standing position with modified independent assist and LRD to promote safe out of bed activity           Bed to chair (Progressing)       Start:  02/06/25    Expected End:  02/20/25       Pt will transfer from sitting edge of bed to the chair with modified independent assist and LRD to promote out of bed activity and reduce the risks of prolonged acute care bedrest              Pain - Adult          Safety       Safe Mobility Techniques (Progressing)       Start:  02/06/25    Expected End:  02/20/25       Pt will correctly identify and demonstrate safe mobility techniques to reduce their risks for falls during their acute care stay            Spine Precaution (Progressing)       Start:  02/06/25    Expected End:  02/20/25       Pt will be independent with both understanding and demonstration of post op spine restrictions to promote safe functional mobility at discharge                  Education Documentation  Precautions, taught by Shalom Santos, PT at 2/6/2025  1:59 PM.  Learner: Patient  Readiness: Acceptance  Method: Explanation,  Demonstration  Response: Needs Reinforcement  Comment: safe mobility techniques, log roll, spine precautions    Mobility Training, taught by Shalom Santos, PT at 2/6/2025  1:59 PM.  Learner: Patient  Readiness: Acceptance  Method: Explanation, Demonstration  Response: Needs Reinforcement  Comment: safe mobility techniques, log roll, spine precautions    Education Comments  No comments found.

## 2025-02-06 NOTE — PROGRESS NOTES
Orthopaedic Spine Surgery Post-Operative Check Note    Patient ID: Chris Rivas is a 54 y.o. male.    Subjective  Lying in bed comfortably. Reports persistent numbness of the left foot which was present pre-operatively. No LE radicular pain currently. Reports appropriate incisional low back pain.     Vitals & Measurements  Vitals:    02/06/25 1130   BP: 97/67   Pulse: 61   Resp: 13   Temp:    SpO2: 99%        Ortho Exam  General: AO x 3, NAD  Cardio: examined extremities perfused by peripheral palpation  Resp: breathing unlabored    Lower Extremity  Right  Left  IP   5/5  5/5  Quadriceps  5/5  5/5  Tibialis anterior  5/5  5/5  EHL   5/5  5/5  Gastrocnemius  5/5  5/5    Sensation: Normal to light touch throughout upper and lower extremities bilaterally, save for numbness in left foot.        Assessment/Plan  Chris Rivas is a 54 y.o. male s/p L4-5 laminectomy and right L5-S1 hemilaminotomy and foraminotomy. Doing well post-op.     - Admit to orthopaedic spine surgery service  - Internal medicine co-management consultation service  - Ancef IV 2 g Q8H x24 hours  - VTE PPX: SCDs only, mobilization  - Drain: HV x1  - Dressing: Dry dressings  - Diet: okay for regular diet when able  - Campbell catheter removed  - Decadron 10 mg Q8H x24 hours  - PT/OT - mobilize ad billy. No heavy lifting >10 lbs, bending, or twisting  - Brace: none  - CM consultation for dispo, anticipate DC to home tomorrow (POD1)         --    Crow Wilkins MD  Orthopaedic Spine Surgery  , Department of Orthopaedic Surgery  [facility]

## 2025-02-06 NOTE — OP NOTE
Date: 2025  OR Location: [facility] OR    Name: Chris Rivas, : 1970, Age: 54 y.o., MRN: [id number], Sex: male    Diagnosis  Pre-op Diagnosis      * Lumbar radiculopathy [M54.16]     * Lumbar stenosis with neurogenic claudication [M48.062] Post-op Diagnosis     * Lumbar radiculopathy [M54.16]     * Lumbar stenosis with neurogenic claudication [M48.062]     Procedures  L4-5 laminectomy, medial facetectomies, with foraminotomies   Left L5-S1 hemilaminotomy, foraminotomy    Surgeons      * Crow Wilkins MD - Primary    Resident/Fellow/Other Assistant:     * Shyanne Muhammad (surgical assist) - Assisting    No qualified orthopaedic surgery resident was available for the case.       Procedure Summary  Anesthesia: General  ASA: III  Anesthesia Staff: Anesthesiologist: Chandrakant Copeland MD  Estimated Blood Loss: 20 mL  Specimen: No specimens collected     Staff:   Circulator: Jose Amaya Person: Eliana Waterman Circulator: Micheline    Implants:  None      Findings: Severe lumbar lateral recess stenosis at L4-5 and left L5-S1 with hypertrophic ligamentum flavum and surrounding spondylosis; moderate central canal stenosis with adhesive disease on dura.       Indications: Chris Rivas is a 54 y.o. male who presented to me back on 2024 with chronic neck and left upper extremity pain in addition to chronic low back and left greater than right lower extremity pain.  MRI evaluation demonstrated right-sided neuroforaminal stenosis at C4-5 and left greater than right neuroforaminal stenosis C5-6.  Patient had been working with pain management, Dr. Stark.  He was initiated on some conservative management for his radiculopathy with a referral to physical therapy.  He was also scheduled to see neurology regarding jerky, seizure-like movements with the upper extremities.    With regards to his lumbar spine, patient had longstanding issues with low back and lower extremity pain for many years.  We updated his MRI imaging  of his lumbar spine which demonstrated moderate spinal canal stenosis at L4-5 with moderate to severe left greater than right lateral recess and neuroforaminal stenosis at L4-5.  He also demonstrated moderate to severe left-sided lateral recess and neuroforaminal stenosis at L5-S1.  His upright XR imaging demonstrated no spondylolisthesis or instability.  With regards to his lumbar spine, he had exhausted nonsurgical management.  He has previously taken gabapentin, meloxicam, and diclofenac.  He had completed physical therapy for his low back in the remote past.  He has had a number of injections to his lower back including diagnostic medial branch blocks and RFA's at L4-5, L5-S1 on 11/6/2023, 11/20/2023 with reported 80% improvement around that time.  He did previously have an L4-5 HAYLEY which did not provide much improvement.    We had an extensive discussion in the office with regards to his lumbar spine imaging and his low back, lower extremity symptoms.  We discussed continue nonsurgical management in the form of continued physical therapy or injections versus surgical management.  We discussed how a surgical endeavor would entail a laminectomy at L4-5 with a left-sided hemilaminotomy and foraminotomies at L5-S1.  We discussed the risk, benefits, expected outcomes, and personnel involved with such an endeavor.     The risks, benefits, complications, treatment options, non-operative alternatives, expected recovery and outcomes were discussed with the patient. The possibilities of reaction to medication, pulmonary aspiration, injury to surrounding structures, bleeding, recurrent infection, the need for additional procedures, failure to diagnose a condition, and creating a complication requiring transfusion or operation were discussed with the patient. I discussed the risks of surgery which includes but not limited to infection at the surgical site or wound healing requiring additional surgery to clean the infection  and long term IV antibiotics. There is risk of blood loss requiring blood transfusion. Risk of dural tear requiring repair and possible continue cerebral spinal fluid leakage and positional headaches. Risk of nerve root injury resulting in temporary or permeant weakness, numbness, or radicular pain such as a nerve root palsy. Risk of epidural hematoma and compression resulting in weakness in extremity and bowel and bladder disturbances requiring additional surgery to clear out hematoma.  Risk of future instability at the decompressed levels requiring fusion and repeat surgeries.  Risk of needing to remain intubated after surgery for facial swelling. Risk of dysphagia or difficulty swallowing requiring feeding tube briefly. Risk of blindness if need to be in a prone position for surgery. Other complications include skin breakdown or skin blistering from being prone for long hours, developing MI, stroke, DVT, PE during or after surgery. Risk of death. Risk of complications from anesthesia requiring prolonged intubation.     Of note, patient is an every day smoker.  We discussed that he is at a higher risk of postoperative wound healing and infection given his smoking.  He has smoked for several years and confirmed that he would work on trying to cut back and quit around the time of his surgery in order to optimize his postoperative healing.     The patient concurred with the proposed plan, giving informed consent. The site of surgery was properly noted/marked if necessary per policy.         Procedure Details: Patient was identified in the preoperative area, consent reviewed, operative area marked. Taken to the operating room where general anesthesia was induced. SCDs were placed on the lower extremities.  Campbell placed in standard sterile fashion. Patient was then carefully rolled prone on an open Glenn frame with the abdomen hanging free.  All bony prominences were well padded.      The patient was then prepped and  draped in normal sterile fashion. They received preoperative Ancef for prophylactic antibiosis within 30 minutes of the incision.  They also received 10 mg of IV Decadron and 1 g of IV TXA preincision.  Time-out was performed and all in the room were in agreement.    We took an intraoperative needle localization x-ray which helped to guide our incision. The incision was carried out over the operative levels with subperiosteal dissection out to the medial aspect of the L4-5 joints bilaterally and the L5-S1 joint on the left-hand side. lntraoperative radiographic localization of levels was confirmed. A Leksell rongeur was then used to remove the spinous processes of L4 and the cephalad aspect of L5. We thinned down the dorsal cortices and then carefully entered the spinal canal, identifying the plane between the ligamentum flavum and the dural sac. The flavum was then excised in its entirety from pedicle to pedicle. Laminectomies were completed of L4-5, along with partial medial facetectomies and foraminotomies. There was asignificant amount of lateral recess stenosis that was thoroughly removed with Kerrison rongeurs. We resected all the ligamentum flavum and carefully undercut the pars and the facets.    We then performed a hemilaminectomy on the left-hand side at L5-S1.  Using a high-speed elvis, a left hemilaminectomy of L5 was performed along with a partial medial facetectomy of L5-S1. We identified the ligamentum flavum and resected it over the jose ramon-laminar interval. Partial medial facetectomy and foraminotomy of the traversing nerve root were completed.  The lateral recess was thoroughly decompressed.  We were able to track the traversing L5 nerve root on the left-hand side at the L4-5 interval and followed its exit out the left L5-S1 foramen, palpably feeling the medial border of the left L5 pedicle to identify this anatomy and confirming adequate decompression of the segments.    We then irrigated the wound  thoroughly with 1 L of lactated Ringer's in addition to Irrisept.  We placed a deep HV drain, 2 g of vancomycin powder, confirmed our hemostasis, and performed a layered closure of #1 Vicryl in the fascia, 2-0 subcutaneous tissue, monocryl for skin. Wound was dressed sterilely. Patient was rolled supine, and taken to recovery in stable condition.     Complications:  None; patient tolerated the procedure well.     Disposition: PACU - hemodynamically stable.  Condition: stable     Additional Details: None     Attending Attestation: I was present and scrubbed for the entire procedure., save for superficial closure.     --    Crow Wilkins MD  Orthopaedic Spine Surgery  , Department of Orthopaedic Surgery  [facility]

## 2025-02-06 NOTE — ANESTHESIA POSTPROCEDURE EVALUATION
Patient: Chris Rivas    Procedure Summary       Date: 02/06/25 Room / Location: [facility] OR 02 / [facility]    Anesthesia Start: 0752 Anesthesia Stop: 1105    Procedure: Bilateral Lumbar 4-5 Laminectomy; Lumbar 5 - Sacral 1 Left Hemilaminectomy (Spine Lumbar) Diagnosis:       Lumbar radiculopathy      Lumbar stenosis with neurogenic claudication      (Lumbar radiculopathy [M54.16])      (Lumbar stenosis with neurogenic claudication [M48.062])    Surgeons: Crow WHIPPLE MD Responsible Provider: Chandrakant Copeland MD    Anesthesia Type: general ASA Status: 3            Anesthesia Type: general    Vitals Value Taken Time   BP 99/63 02/06/25 1201   Temp 36 °C (96.8 °F) 02/06/25 1052   Pulse 62 02/06/25 1202   Resp 13 02/06/25 1202   SpO2 99 % 02/06/25 1202   Vitals shown include unfiled device data.    Anesthesia Post Evaluation    Patient location during evaluation: PACU  Patient participation: complete - patient participated  Level of consciousness: sleepy but conscious  Pain score: 2  Pain management: adequate  Multimodal analgesia pain management approach  Airway patency: patent  Cardiovascular status: acceptable  Respiratory status: acceptable  Hydration status: acceptable  Postoperative Nausea and Vomiting: none        No notable events documented.

## 2025-02-06 NOTE — CONSULTS
Consults    Reason For Consult  Postoperative management of COPD, anxiety      History Of Present Illness  Chris Rivas is a 54 y.o. male [facility] for lumbar spine sx.  He is sitting up in bed.  He rates his pain 5/10.  He says he has already worked with therapy and was sitting up in the chair eating.  Denies any chest pain, palpitations, dyspnea.       Past Medical History  He has a past medical history of Anxiety, Arthritis, Chronic pain disorder, COPD (chronic obstructive pulmonary disease) (Multi), Headache, Hyperlipidemia, Lower back pain, Lumbar spondylosis, Neck pain, Panic attacks, and Sleep apnea.    Surgical History  He has a past surgical history that includes Appendectomy (08/08/2014); Lung surgery (Right, 2014); Joint replacement (Left); Joint replacement (Right); Other surgical history (12/2023); and Back surgery (12/16/23).     Social History  He reports that he quit smoking about 11 years ago. His smoking use included cigarettes. He started smoking about 53 years ago. He has a 26 pack-year smoking history. He has never used smokeless tobacco. He reports that he does not currently use alcohol after a past usage of about 2.0 standard drinks of alcohol per week. He reports current drug use. Drug: Marijuana.    Family History  Family History   Problem Relation Name Age of Onset    Leukemia Mother Taylor     Cancer Mother Taylor     Dementia Father          Allergies  Lavender and Oxycodone-acetaminophen    Review of Systems  ROS negative     Physical Exam    Neuro: pt alert and oriented x3  Heart: RRR, no murmors/gallops  Lungs: CTA, no dyspnea. respirations even non-labored.  Abdomen: BSx4 , soft non-tender  Extremities: No edema, pedal pulses positive, no cyanosis  Musculoskeletal: MAEx4   Skin: Drsg to lumbar spine dry and intact, hemovac present       Last Recorded Vitals  /65 (BP Location: Left arm, Patient Position: Lying)   Pulse (!) 47   Temp 36.4 °C (97.5 °F) (Temporal)    Resp 17   Wt 98.9 kg (218 lb)   SpO2 100%     Relevant Results  Scheduled medications  ceFAZolin, 2 g, intravenous, q8h  dexAMETHasone, 10 mg, intravenous, q8h KING  gabapentin, 600 mg, oral, TID  ketorolac, 15 mg, intravenous, q6h KING  methocarbamol, 1,000 mg, oral, TID  PARoxetine, 10 mg, oral, Nightly  polyethylene glycol, 17 g, oral, Daily      Continuous medications  lactated Ringer's, 100 mL/hr, Last Rate: 100 mL/hr (02/06/25 1631)      PRN medications  PRN medications: albuterol, bisacodyl, dextrose, dextrose, glucagon, glucagon, HYDROcodone-acetaminophen, HYDROcodone-acetaminophen, HYDROmorphone, naloxone, nicotine polacrilex, ondansetron ODT **OR** ondansetron, oxygen, prochlorperazine **OR** prochlorperazine **OR** prochlorperazine      Assessment/Plan     Lumbar spine sx  -DVT prophylaxis per surgeon  -pain control  -PT, OT    Anxiety  -continue paxil    Tobacco Use  -encourage cessation  -nicotine gum PRN    Plan   Thank you for this consultation, will follow along.   Aurelia Lebron, APRN-CNP

## 2025-02-06 NOTE — H&P
[facility] Department of Orthopaedic Surgery   Surgical History & Physical >30 Days  02/06/25    Reason for Surgery: Lumbar stenosis  Planned Procedure: L4-5 laminectomy, left L5-S1 hemilaminotomy/foraminotomy    History & Physical Reviewed:  I have reviewed the History and Physical obtained greater than 30 days ago. Relevant findings and updates are noted below:  No significant changes.    Home medications were reviewed with significant updates noted below:  No significant changes.    Allergies:  Allergies   Allergen Reactions    Lavender Anaphylaxis    Oxycodone-Acetaminophen Nausea/vomiting       Review of Systems:  Review of Systems   Gen: Denies recent weight loss  Neuro: Denies recent confusion  Ophtho: Denies changes in vision  ENT: Denies changes in hearing  Endo: Denies weight loss/weight gain  CV: Denies chest pain  Resp: Denies shortness of breath  GI: Denies melena/hematochezia  : Denies painful urination  MSK: Per above HPI  Heme: No abnormal bleeding  Psych: Denies hallucinations    ERAS patient?: Yes - spine lami    COVID-19 Risk Consent:   Surgeon has reviewed the key risks related to marlny COVID-19 and subsequent sequelae.       02/06/25 at 7:27 AM - Crow Wilkins MD

## 2025-02-06 NOTE — PROGRESS NOTES
Occupational Therapy    Evaluation    Patient Name: Chris Rivas  MRN: [id number]  Department: [address]  Room: [address] [place] [address]  Today's Date: 2/6/2025  Time Calculation  Start Time: 1330  Stop Time: 1344  Time Calculation (min): 14 min    Assessment  IP OT Assessment  OT Assessment: 55 y/o male s/p laminectomy at L4-5 with a left-sided hemilaminotomy and foraminotomies at L5-S1.  On eval, he requires increased assist for all xfers, mobility, and self care d/t decreased strength, balance, activity tolerance, coordination and LLE proprioception. Skilled OT services are required to maximize safety/IND prior to DC.  Prognosis: Excellent  Barriers to Discharge Home: Caregiver assistance, Physical needs  Caregiver Assistance: Patient lives alone and/or does not have reliable caregiver assistance  Physical Needs: Intermittent mobility assistance needed, Intermittent ADL assistance needed, High falls risk due to function or environment  Evaluation/Treatment Tolerance: Patient tolerated treatment well  Medical Staff Made Aware: Yes  End of Session Communication: Bedside nurse  End of Session Patient Position: Up in chair, Alarm on    Plan:  Treatment Interventions: ADL retraining, Functional transfer training, UE strengthening/ROM, Endurance training, Patient/family training, Equipment evaluation/education, Neuromuscular reeducation, Compensatory technique education  OT Frequency: 4 times per week    OT Discharge Recommendations: Low intensity level of continued care (anticipate low needs as mobility/ADL completion improves.  needed assist x2 on 2/6 with notable Lt knee buckling.  will reassess at next f/u visit.)    Equipment Recommended upon Discharge: Wheeled walker  OT Recommended Transfer Status: Assist of 2  OT - OK to Discharge: Yes    Subjective   Current Problem:  1. Lumbar radiculopathy        2. Lumbar stenosis with neurogenic claudication          General:  General  Reason for Referral: Decreased ADLS, s/p laminectomy  at L4-5 with a left-sided hemilaminotomy and foraminotomies at L5-S1  Referred By: Dr. Wilkins  Past Medical History Relevant to Rehab: anxiety, arthritis, chronic pain, headache, low back pain, neck pain, panic attacks  Family/Caregiver Present: No  Co-Treatment: PT  Co-Treatment Reason: for safe advancement of activity  Prior to Session Communication: Bedside nurse  Patient Position Received: Bed, 3 rail up, Alarm on  Preferred Learning Style: visual, verbal  General Comment: Cleared by nsg, pt met in supine, agreeable to OT assessment.  Precautions:  Hearing/Visual Limitations: vision and hearing WFL  Medical Precautions: Fall precautions, Spinal precautions  Post-Surgical Precautions: Spinal precautions  Precautions Comment: pt educated on spine precautions    Pain:  Pain Assessment  Pain Assessment: 0-10  0-10 (Numeric) Pain Score: 7  Pain Type: Acute pain, Surgical pain  Pain Location: Back  Pain Orientation: Lower  Pain Interventions: Repositioned (medicated by nsg for pain prior to OT arrival.)  Response to Interventions: No change in pain    Objective   Cognition:  Overall Cognitive Status: Within Functional Limits  Orientation Level: Oriented X4  Cognition Comments: mild post op lethargy.    Home Living:  Type of Home: House  Lives With: Alone  Home Adaptive Equipment: None  Home Layout: One level  Home Access: Stairs to enter with rails  Entrance Stairs-Rails: Right  Entrance Stairs-Number of Steps: 3 GALILEA  Bathroom Shower/Tub: Tub/shower unit  Bathroom Toilet: Standard  Bathroom Equipment: Grab bars in shower  Bathroom Accessibility: main level  Home Living Comments: limited social support. has a friend/friend's SO who can intermittently assist at DC.     Prior Function:  Level of [place]: Independent with ADLs and functional transfers, Independent with homemaking with ambulation  ADL Assistance: Independent  Homemaking Assistance: Independent  Ambulatory Assistance: Independent  Vocational: Full time  employment ()  Hand Dominance: Right  Prior Function Comments: 2 falls in 3 months.  works FT. drives.    ADL:  Eating Assistance: Stand by  Eating Deficit: Setup  Grooming Assistance: Stand by  Grooming Deficit: Setup  Bathing Assistance: Maximal  Bathing Deficit:  (antcipated in standing phase)  UE Dressing Assistance: Minimal  UE Dressing Deficit: Pull around back (gown mgmt)  LE Dressing Assistance: Maximal  LE Dressing Deficit:  (anticipated)  Toileting Assistance with Device: Maximal  Toileting Deficit:  (anticipated in standing phase)  ADL Comments: all per clinical judgement d/t post op pain, LLE weakness, lethargy    Activity Tolerance:  Endurance: Decreased tolerance for upright activites  Activity Tolerance Comments: fair    Bed Mobility/Transfers:   Bed Mobility  Bed Mobility: Yes  Bed Mobility 1  Bed Mobility 1: Supine to sitting  Level of Assistance 1: Minimum assistance  Bed Mobility Comments 1: via log roll technique. cued on accuracy of completion. min A for trunk up.    Transfers  Transfer: Yes  Transfer 1  Transfer From 1: Bed to  Transfer to 1: Stand  Technique 1: Sit to stand, Stand to sit  Transfer Level of Assistance 1: Minimum assistance, Moderate assistance, +2  Trials/Comments 1: b/l arm in arm assist. STS from EOB with min-mod A x2 for stability. notable LT knee buckling and LE external rotation on initial stand.  Lt knee blocked to prevent additional instability  Transfers 2  Transfer From 2: Bed to  Transfer to 2: Chair with arms  Technique 2: Stand pivot  Transfer Level of Assistance 2: Minimum assistance, Moderate assistance, +2, Arm in arm assistance  Trials/Comments 2: b/l arm in arm assist for stability during stand pivot to bedside chair.  min-mod A x2 for safety with Lt knee blocked to maintain stability.    Functional Mobility:  Functional Mobility  Functional Mobility Performed: No    Sitting Balance:  Static Sitting Balance  Static Sitting-Balance Support: Feet  supported, Bilateral upper extremity supported  Static Sitting-Level of Assistance: Close supervision  Static Sitting-Comment/Number of Minutes: EOB sitting    Vision: Vision - Basic Assessment  Current Vision: Does not wear glasses  Sensation:  Light Touch: Partial deficits in the RLE, Partial deficits in the LLE  Proprioception: Partial deficits in the LLE  Sensation Comment: Rt foot and Lt hip numbness reported  Strength:  Strength Comments: RUE at least >/= 3+/5, LUE at least >/= 3-/5, limited shoulder AROM  Coordination:  Movements are Fluid and Coordinated: No  Upper Body Coordination: WFL  Lower Body Coordination: impaired   Hand Function:  Hand Function  Gross Grasp: Functional  Coordination: Functional  Extremities: RUE   RUE : Within Functional Limits and LUE   LUE: Exceptions to WFL (decreased AROM LT shoulder)    Outcome Measures: [facility] Daily Activity  Putting on and taking off regular lower body clothing: A lot  Bathing (including washing, rinsing, drying): A lot  Putting on and taking off regular upper body clothing: A little  Toileting, which includes using toilet, bedpan or urinal: A lot  Taking care of personal grooming such as brushing teeth: A little  Eating Meals: A little  Daily Activity - Total Score: 15      Education Documentation  Body Mechanics, taught by Dinh Monzon OT at 2/6/2025  2:06 PM.  Learner: Patient  Readiness: Acceptance  Method: Explanation  Response: Needs Reinforcement  Comment: fall precautions, post op spine precautions, OT POC    Precautions, taught by Dinh Monzon OT at 2/6/2025  2:06 PM.  Learner: Patient  Readiness: Acceptance  Method: Explanation  Response: Needs Reinforcement  Comment: fall precautions, post op spine precautions, OT POC    ADL Training, taught by Dinh Monzon OT at 2/6/2025  2:06 PM.  Learner: Patient  Readiness: Acceptance  Method: Explanation  Response: Needs Reinforcement  Comment: fall precautions, post op spine precautions, OT POC    Education  Comments  No comments found.      Goals:   Encounter Problems       Encounter Problems (Active)       OT Goals       ADLs (Progressing)       Start:  02/06/25    Expected End:  02/20/25       Patient will complete ADL tasks, with set-up using AE as needed in order to increase patient's safety and independence with self-care tasks.           Functional Transfers (Progressing)       Start:  02/06/25    Expected End:  02/20/25       Patient will complete functional transfers with set-up using least restrictive device, in order to increase patient's safety and independence with daily tasks.           Activity Tolerance (Progressing)       Start:  02/06/25    Expected End:  02/20/25       Patient will demonstrate the ability to participate in functional activity at least >/= 20 minutes in order to increase patient's safety and independence with daily tasks.           Precautions (Progressing)       Start:  02/06/25    Expected End:  02/20/25       Patient will independently verbalize 3/3 post op spine precautions in preparation for ADLs.

## 2025-02-06 NOTE — ANESTHESIA PROCEDURE NOTES
Airway  Date/Time: 2/6/2025 8:00 AM  Urgency: elective    Airway not difficult    Staffing  Performed: attending   Authorized by: Chandrakant Copeland MD    Performed by: Chandrakant Copeland MD  Patient location during procedure: OR    Indications and Patient Condition  Indications for airway management: anesthesia  Spontaneous Ventilation: absent  Sedation level: deep  Preoxygenated: yes  Patient position: sniffing  MILS not maintained throughout  Mask difficulty assessment: 1 - vent by mask  Planned trial extubation    Final Airway Details  Final airway type: endotracheal airway      Successful airway: ETT  Cuffed: yes   Successful intubation technique: direct laryngoscopy  Endotracheal tube insertion site: oral  Blade: Alonso  Blade size: #3  ETT size (mm): 8.0  Cormack-Lehane Classification: grade I - full view of glottis  Placement verified by: chest auscultation and capnometry   Cuff volume (mL): 5  Measured from: lips  ETT to lips (cm): 22  Number of attempts at approach: 1

## 2025-02-07 ENCOUNTER — PHARMACY VISIT (OUTPATIENT)
Dept: PHARMACY | Facility: CLINIC | Age: 55
End: 2025-02-07
Payer: COMMERCIAL

## 2025-02-07 VITALS
BODY MASS INDEX: 25.22 KG/M2 | SYSTOLIC BLOOD PRESSURE: 117 MMHG | TEMPERATURE: 97.3 F | HEIGHT: 78 IN | DIASTOLIC BLOOD PRESSURE: 63 MMHG | WEIGHT: 218 LBS | RESPIRATION RATE: 18 BRPM | OXYGEN SATURATION: 96 % | HEART RATE: 56 BPM

## 2025-02-07 LAB
ANION GAP SERPL CALCULATED.3IONS-SCNC: 11 MMOL/L (ref 10–20)
BUN SERPL-MCNC: 29 MG/DL (ref 6–23)
CALCIUM SERPL-MCNC: 10.1 MG/DL (ref 8.6–10.3)
CHLORIDE SERPL-SCNC: 104 MMOL/L (ref 98–107)
CO2 SERPL-SCNC: 28 MMOL/L (ref 21–32)
CREAT SERPL-MCNC: 0.76 MG/DL (ref 0.5–1.3)
EGFRCR SERPLBLD CKD-EPI 2021: >90 ML/MIN/1.73M*2
ERYTHROCYTE [DISTWIDTH] IN BLOOD BY AUTOMATED COUNT: 13.7 % (ref 11.5–14.5)
GLUCOSE SERPL-MCNC: 143 MG/DL (ref 74–99)
HCT VFR BLD AUTO: 35.2 % (ref 41–52)
HGB BLD-MCNC: 11.1 G/DL (ref 13.5–17.5)
MCH RBC QN AUTO: 29.9 PG (ref 26–34)
MCHC RBC AUTO-ENTMCNC: 31.5 G/DL (ref 32–36)
MCV RBC AUTO: 95 FL (ref 80–100)
NRBC BLD-RTO: 0 /100 WBCS (ref 0–0)
PLATELET # BLD AUTO: 193 X10*3/UL (ref 150–450)
POTASSIUM SERPL-SCNC: 4.7 MMOL/L (ref 3.5–5.3)
RBC # BLD AUTO: 3.71 X10*6/UL (ref 4.5–5.9)
SODIUM SERPL-SCNC: 138 MMOL/L (ref 136–145)
WBC # BLD AUTO: 11.6 X10*3/UL (ref 4.4–11.3)

## 2025-02-07 PROCEDURE — 7100000011 HC EXTENDED STAY RECOVERY HOURLY - NURSING UNIT

## 2025-02-07 PROCEDURE — 85027 COMPLETE CBC AUTOMATED: CPT | Performed by: STUDENT IN AN ORGANIZED HEALTH CARE EDUCATION/TRAINING PROGRAM

## 2025-02-07 PROCEDURE — 97116 GAIT TRAINING THERAPY: CPT | Mod: GP,CQ

## 2025-02-07 PROCEDURE — 2500000004 HC RX 250 GENERAL PHARMACY W/ HCPCS (ALT 636 FOR OP/ED): Performed by: STUDENT IN AN ORGANIZED HEALTH CARE EDUCATION/TRAINING PROGRAM

## 2025-02-07 PROCEDURE — 99232 SBSQ HOSP IP/OBS MODERATE 35: CPT | Performed by: NURSE PRACTITIONER

## 2025-02-07 PROCEDURE — 2500000001 HC RX 250 WO HCPCS SELF ADMINISTERED DRUGS (ALT 637 FOR MEDICARE OP): Performed by: STUDENT IN AN ORGANIZED HEALTH CARE EDUCATION/TRAINING PROGRAM

## 2025-02-07 PROCEDURE — 36415 COLL VENOUS BLD VENIPUNCTURE: CPT | Performed by: STUDENT IN AN ORGANIZED HEALTH CARE EDUCATION/TRAINING PROGRAM

## 2025-02-07 PROCEDURE — 97535 SELF CARE MNGMENT TRAINING: CPT | Mod: GO,CO

## 2025-02-07 PROCEDURE — RXMED WILLOW AMBULATORY MEDICATION CHARGE

## 2025-02-07 PROCEDURE — 80048 BASIC METABOLIC PNL TOTAL CA: CPT | Performed by: STUDENT IN AN ORGANIZED HEALTH CARE EDUCATION/TRAINING PROGRAM

## 2025-02-07 PROCEDURE — 97110 THERAPEUTIC EXERCISES: CPT | Mod: GP,CQ

## 2025-02-07 RX ORDER — HYDROCODONE BITARTRATE AND ACETAMINOPHEN 5; 325 MG/1; MG/1
1-2 TABLET ORAL EVERY 6 HOURS PRN
Qty: 28 TABLET | Refills: 0 | Status: SHIPPED | OUTPATIENT
Start: 2025-02-07

## 2025-02-07 RX ORDER — METHOCARBAMOL 500 MG/1
500 TABLET, FILM COATED ORAL 4 TIMES DAILY PRN
Qty: 30 TABLET | Refills: 0 | Status: SHIPPED | OUTPATIENT
Start: 2025-02-07

## 2025-02-07 RX ORDER — DOCUSATE SODIUM 100 MG/1
100 CAPSULE, LIQUID FILLED ORAL 2 TIMES DAILY PRN
Qty: 30 CAPSULE | Refills: 0 | Status: SHIPPED | OUTPATIENT
Start: 2025-02-07

## 2025-02-07 RX ADMIN — KETOROLAC TROMETHAMINE 15 MG: 30 INJECTION, SOLUTION INTRAMUSCULAR; INTRAVENOUS at 06:21

## 2025-02-07 RX ADMIN — METHOCARBAMOL 1000 MG: 500 TABLET ORAL at 14:12

## 2025-02-07 RX ADMIN — HYDROCODONE BITARTRATE AND ACETAMINOPHEN 1 TABLET: 5; 325 TABLET ORAL at 09:19

## 2025-02-07 RX ADMIN — GABAPENTIN 600 MG: 600 TABLET, FILM COATED ORAL at 09:19

## 2025-02-07 RX ADMIN — DEXAMETHASONE SODIUM PHOSPHATE 10 MG: 10 INJECTION INTRAMUSCULAR; INTRAVENOUS at 06:21

## 2025-02-07 RX ADMIN — HYDROCODONE BITARTRATE AND ACETAMINOPHEN 1 TABLET: 5; 325 TABLET ORAL at 14:12

## 2025-02-07 RX ADMIN — POLYETHYLENE GLYCOL 3350 17 G: 17 POWDER, FOR SOLUTION ORAL at 09:18

## 2025-02-07 RX ADMIN — GABAPENTIN 600 MG: 600 TABLET, FILM COATED ORAL at 14:12

## 2025-02-07 RX ADMIN — KETOROLAC TROMETHAMINE 15 MG: 30 INJECTION, SOLUTION INTRAMUSCULAR; INTRAVENOUS at 11:29

## 2025-02-07 RX ADMIN — METHOCARBAMOL 1000 MG: 500 TABLET ORAL at 09:18

## 2025-02-07 SDOH — HEALTH STABILITY: PHYSICAL HEALTH
HOW OFTEN DO YOU NEED TO HAVE SOMEONE HELP YOU WHEN YOU READ INSTRUCTIONS, PAMPHLETS, OR OTHER WRITTEN MATERIAL FROM YOUR DOCTOR OR PHARMACY?: NEVER

## 2025-02-07 SDOH — HEALTH STABILITY: MENTAL HEALTH
DO YOU FEEL STRESS - TENSE, RESTLESS, NERVOUS, OR ANXIOUS, OR UNABLE TO SLEEP AT NIGHT BECAUSE YOUR MIND IS TROUBLED ALL THE TIME - THESE DAYS?: NOT AT ALL

## 2025-02-07 SDOH — SOCIAL STABILITY: SOCIAL NETWORK: HOW OFTEN DO YOU ATTEND CHURCH OR RELIGIOUS SERVICES?: PATIENT DECLINED

## 2025-02-07 SDOH — SOCIAL STABILITY: SOCIAL INSECURITY: ARE YOU MARRIED, WIDOWED, DIVORCED, SEPARATED, NEVER MARRIED, OR LIVING WITH A PARTNER?: PATIENT DECLINED

## 2025-02-07 SDOH — HEALTH STABILITY: PHYSICAL HEALTH: ON AVERAGE, HOW MANY MINUTES DO YOU ENGAGE IN EXERCISE AT THIS LEVEL?: 0 MIN

## 2025-02-07 SDOH — SOCIAL STABILITY: SOCIAL NETWORK
DO YOU BELONG TO ANY CLUBS OR ORGANIZATIONS SUCH AS CHURCH GROUPS, UNIONS, FRATERNAL OR ATHLETIC GROUPS, OR SCHOOL GROUPS?: PATIENT DECLINED

## 2025-02-07 SDOH — SOCIAL STABILITY: SOCIAL NETWORK: HOW OFTEN DO YOU ATTEND MEETINGS OF THE CLUBS OR ORGANIZATIONS YOU BELONG TO?: PATIENT DECLINED

## 2025-02-07 SDOH — SOCIAL STABILITY: SOCIAL NETWORK
IN A TYPICAL WEEK, HOW MANY TIMES DO YOU TALK ON THE PHONE WITH FAMILY, FRIENDS, OR NEIGHBORS?: MORE THAN THREE TIMES A WEEK

## 2025-02-07 SDOH — HEALTH STABILITY: PHYSICAL HEALTH: ON AVERAGE, HOW MANY DAYS PER WEEK DO YOU ENGAGE IN MODERATE TO STRENUOUS EXERCISE (LIKE A BRISK WALK)?: 0 DAYS

## 2025-02-07 SDOH — SOCIAL STABILITY: SOCIAL NETWORK: HOW OFTEN DO YOU GET TOGETHER WITH FRIENDS OR RELATIVES?: MORE THAN THREE TIMES A WEEK

## 2025-02-07 ASSESSMENT — PAIN DESCRIPTION - LOCATION
LOCATION: BACK
LOCATION: BACK

## 2025-02-07 ASSESSMENT — PAIN SCALES - GENERAL
PAINLEVEL_OUTOF10: 3
PAINLEVEL_OUTOF10: 5 - MODERATE PAIN
PAINLEVEL_OUTOF10: 3
PAINLEVEL_OUTOF10: 6
PAINLEVEL_OUTOF10: 3
PAINLEVEL_OUTOF10: 3
PAINLEVEL_OUTOF10: 5 - MODERATE PAIN

## 2025-02-07 ASSESSMENT — COGNITIVE AND FUNCTIONAL STATUS - GENERAL
TOILETING: A LITTLE
MOBILITY SCORE: 18
CLIMB 3 TO 5 STEPS WITH RAILING: A LITTLE
DRESSING REGULAR LOWER BODY CLOTHING: A LITTLE
MOVING FROM LYING ON BACK TO SITTING ON SIDE OF FLAT BED WITH BEDRAILS: A LITTLE
DAILY ACTIVITIY SCORE: 20
MOBILITY SCORE: 18
CLIMB 3 TO 5 STEPS WITH RAILING: A LITTLE
MOVING TO AND FROM BED TO CHAIR: A LITTLE
WALKING IN HOSPITAL ROOM: A LITTLE
MOVING TO AND FROM BED TO CHAIR: A LITTLE
HELP NEEDED FOR BATHING: A LITTLE
STANDING UP FROM CHAIR USING ARMS: A LITTLE
WALKING IN HOSPITAL ROOM: A LITTLE
TURNING FROM BACK TO SIDE WHILE IN FLAT BAD: A LITTLE
STANDING UP FROM CHAIR USING ARMS: A LITTLE
TURNING FROM BACK TO SIDE WHILE IN FLAT BAD: A LITTLE
MOVING FROM LYING ON BACK TO SITTING ON SIDE OF FLAT BED WITH BEDRAILS: A LITTLE
DRESSING REGULAR UPPER BODY CLOTHING: A LITTLE

## 2025-02-07 ASSESSMENT — PAIN DESCRIPTION - ORIENTATION
ORIENTATION: MID;LOWER
ORIENTATION: MID;LOWER

## 2025-02-07 ASSESSMENT — PAIN - FUNCTIONAL ASSESSMENT
PAIN_FUNCTIONAL_ASSESSMENT: 0-10

## 2025-02-07 ASSESSMENT — ACTIVITIES OF DAILY LIVING (ADL): HOME_MANAGEMENT_TIME_ENTRY: 29

## 2025-02-07 NOTE — PROGRESS NOTES
Physical Therapy    Physical Therapy Treatment    Patient Name: Chris Rivas  MRN: [id number]  Department: [address]  Room: [address] [place] [address]  Today's Date: 2/7/2025  Time Calculation  Start Time: 0834  Stop Time: 0918  Time Calculation (min): 44 min         Assessment/Plan   PT Assessment  PT Assessment Results: Decreased strength, Decreased endurance, Impaired balance, Decreased cognition, Decreased coordination, Decreased mobility, Decreased safety awareness, Impaired judgement, Impaired sensation, Impaired tone, Decreased skin integrity, Orthopedic restrictions, Pain  Rehab Prognosis: Good  Barriers to Discharge Home: No anticipated barriers  Physical Needs: Stair navigation into home limited by function/safety, Intermittent mobility assistance needed, Intermittent ADL assistance needed  Evaluation/Treatment Tolerance: Patient tolerated treatment well  Medical Staff Made Aware: Yes  Strengths: Premorbid level of function  Barriers to Participation: Comorbidities  End of Session Communication: Bedside nurse  Assessment Comment: Mobility greatly improved from initial eval. Pt tolerating extended ambulation distances in halls, did well using straight cane. Pt was able to negotiate stairs without difficulty.  End of Session Patient Position: Up in chair, Alarm on     PT Plan  Treatment/Interventions: Bed mobility, Transfer training, Gait training, Stair training, Balance training, Strengthening, Endurance training, Therapeutic exercise, Therapeutic activity  PT Plan: Ongoing PT  PT Frequency: BID  PT Discharge Recommendations: Low intensity level of continued care  Equipment Recommended upon Discharge: Straight cane (Per pt has a cane to use at home)  PT Recommended Transfer Status: Assistive device, Contact guard  PT - OK to Discharge: Yes      General Visit Information:   PT  Visit  PT Received On: 02/07/25  Response to Previous Treatment: Patient with no complaints from previous session.  General  Reason for Referral:  Recent Surgery-L4/L5 Laminectomy  Referred By: Dr. Wilkins  Past Medical History Relevant to Rehab: anxiety, arthritis, chronic pain, headache, low back pain, neck pain, panic attacks  Prior to Session Communication: Bedside nurse  Patient Position Received: Bed, 3 rail up, Alarm on  Preferred Learning Style: verbal, visual  General Comment: Cleared per nurse to see pt for PT. Pt agreeable.    Subjective   Precautions:  Precautions  Hearing/Visual Limitations: vision and hearing WFL  Medical Precautions: Fall precautions  Post-Surgical Precautions: Spinal precautions  Precautions Comment: Reviewed spinal precautions with pt.        Objective   Pain:  Pain Assessment  Pain Assessment: 0-10  0-10 (Numeric) Pain Score: 5 - Moderate pain  Pain Type: Surgical pain  Pain Location: Back  Pain Orientation: Lower  Pain Interventions: Repositioned, Ambulation/increased activity (RN aware)  Response to Interventions: Resting quietly    Cognition:  Cognition  Overall Cognitive Status: Within Functional Limits  Orientation Level: Oriented X4     Treatments:  Therapeutic Exercise  Therapeutic Exercise Performed: Yes  Therapeutic Exercise Activity 1: Pt performed supine bilat LE ankle pumps, quad sets, glute sets, heel slides, hip abduction slides and SAQ x15 rep each.    Bed Mobility 1  Bed Mobility 1: Supine to sitting, Log roll  Level of Assistance 1: Close supervision, Minimal verbal cues  Bed Mobility Comments 1: Verbal cues for log roll    Ambulation/Gait Training 1  Surface 1: Level tile  Device 1: Rolling walker  Assistance 1: Contact guard  Comments/Distance (ft) 1: Pt ambulated with RW ~90' x1 CGA. Slow, guarded pace. Very cautious. No LOB or LE buckling.  Ambulation/Gait Training 2  Surface 2: Level tile  Device 2: No device  Assistance 2: Contact guard  Comments/Distance (ft) 2: Pt ambulated without AD ~15' x1 CGA. Pt even more guarded and cautious though no LOB  Ambulation/Gait Training 3  Surface 3: Level tile  Device  3: Cane single point cane  Assistance 3: Contact guard  Comments/Distance (ft) 3: Pt ambulated with straight cane ~140' x1 CGA. Pace still a little slow but not as guarded and catious vs no device. Again no LOB or LE buckling.    Transfer 1  Transfer From 1: Bed to  Transfer to 1: Stand  Technique 1: Sit to stand  Transfer Device 1: Walker  Transfer Level of Assistance 1: Contact guard, Minimal verbal cues  Trials/Comments 1: Verbal cues to push from bed sit to stand.  Transfers 2  Transfer From 2: Stand to  Transfer to 2: Sit, Chair with arms  Technique 2: Stand to sit  Transfer Device 2: Cane  Transfer Level of Assistance 2: Close supervision  Trials/Comments 2: Verbal cues for fully backing up to chair and to reach back for chair arms.    Stairs  Stairs: Yes  Stairs  Rails 1: Right  Curb Step 1: No  Device 1: Railing, Single point cane  Assistance 1: Contact guard, Minimal verbal cues  Comment/Number of Steps 1: Up/down 4 steps with straight cane and one rail, CGA and verbal cues for cane placement and sequencing non reciprocal pattern.    Outcome Measures:  [facility] Basic Mobility  Turning from your back to your side while in a flat bed without using bedrails: A little  Moving from lying on your back to sitting on the side of a flat bed without using bedrails: A little  Moving to and from bed to chair (including a wheelchair): A little  Standing up from a chair using your arms (e.g. wheelchair or bedside chair): A little  To walk in hospital room: A little  Climbing 3-5 steps with railing: A little  Basic Mobility - Total Score: 18    Education Documentation  Handouts, taught by Lashawn Saldana PTA at 2/7/2025 10:30 AM.  Learner: Patient  Readiness: Acceptance  Method: Explanation  Response: Verbalizes Understanding  Comment: Safety with mobility, stairs, spinal precautions.    Precautions, taught by Lashawn Saldana PTA at 2/7/2025 10:30 AM.  Learner: Patient  Readiness: Acceptance  Method: Explanation  Response:  Verbalizes Understanding  Comment: Safety with mobility, stairs, spinal precautions.    Body Mechanics, taught by Lashawn Saldana PTA at 2/7/2025 10:30 AM.  Learner: Patient  Readiness: Acceptance  Method: Explanation  Response: Verbalizes Understanding  Comment: Safety with mobility, stairs, spinal precautions.    Home Exercise Program, taught by Lashawn Saldana PTA at 2/7/2025 10:30 AM.  Learner: Patient  Readiness: Acceptance  Method: Explanation  Response: Verbalizes Understanding  Comment: Safety with mobility, stairs, spinal precautions.    Mobility Training, taught by Lashawn Saldana PTA at 2/7/2025 10:30 AM.  Learner: Patient  Readiness: Acceptance  Method: Explanation  Response: Verbalizes Understanding  Comment: Safety with mobility, stairs, spinal precautions.    Education Comments  No comments found.        OP EDUCATION:       Encounter Problems       Encounter Problems (Active)       Balance       Sitting Balance (Progressing)       Start:  02/06/25    Expected End:  02/20/25       Pt will demonstrate good sitting balance to promote safe engagement with out of bed activities           Standing Balance (Progressing)       Start:  02/06/25    Expected End:  02/20/25       Pt will demonstrate good static standing balance to promote safe participation with out of bed activity, transfers, and mobility              Mobility       Ambulation (Progressing)       Start:  02/06/25    Expected End:  02/20/25       Pt will ambulate 50' modified independent assist with LRD to promote safe home mobility           Entry Stair Negotiation (Progressing)       Start:  02/06/25    Expected End:  02/20/25       Pt will ascend/descend 3 stairs with rail(s) on R and modified independent assist to promote safe entry and exit in home environment                PT Transfers       Supine to sit via log roll (Progressing)       Start:  02/06/25    Expected End:  02/20/25       Pt will transfer supine to sitting at edge of bed with  modified independent assist to promote acute care out of bed activity           Sit to stand (Progressing)       Start:  02/06/25    Expected End:  02/20/25       Pt will transfer sit to standing position with modified independent assist and LRD to promote safe out of bed activity           Bed to chair (Progressing)       Start:  02/06/25    Expected End:  02/20/25       Pt will transfer from sitting edge of bed to the chair with modified independent assist and LRD to promote out of bed activity and reduce the risks of prolonged acute care bedrest              Pain - Adult          Safety       Safe Mobility Techniques (Progressing)       Start:  02/06/25    Expected End:  02/20/25       Pt will correctly identify and demonstrate safe mobility techniques to reduce their risks for falls during their acute care stay            Spine Precaution (Progressing)       Start:  02/06/25    Expected End:  02/20/25       Pt will be independent with both understanding and demonstration of post op spine restrictions to promote safe functional mobility at discharge

## 2025-02-07 NOTE — CARE PLAN
Problem: Pain - Adult  Goal: Verbalizes/displays adequate comfort level or baseline comfort level  Outcome: Progressing   The patient's goals for the shift include comfort.     The clinical goals for the shift include pain management.

## 2025-02-07 NOTE — PROGRESS NOTES
Physical Therapy    Physical Therapy Treatment    Patient Name: Chris Rivas  MRN: [id number]  Department: [address]  Room: [address] [place] [address]  Today's Date: 2/7/2025  Time Calculation  Start Time: 1323  Stop Time: 1353  Time Calculation (min): 30 min         Assessment/Plan   PT Assessment  PT Assessment Results: Decreased strength, Decreased endurance, Impaired balance, Decreased cognition, Decreased coordination, Decreased mobility, Decreased safety awareness, Impaired judgement, Impaired sensation, Impaired tone, Decreased skin integrity, Orthopedic restrictions, Pain  Rehab Prognosis: Good  Barriers to Discharge Home: No anticipated barriers  Physical Needs: Stair navigation into home limited by function/safety, Intermittent mobility assistance needed, Intermittent ADL assistance needed  Evaluation/Treatment Tolerance: Patient tolerated treatment well  Medical Staff Made Aware: Yes  Strengths: Ability to acquire knowledge  Barriers to Participation: Comorbidities  End of Session Communication: Bedside nurse  Assessment Comment: Mobility greatly improved from initial eval. Pt tolerating extended ambulation distances in halls, did well using straight cane. Pt was able to negotiate stairs without difficulty.  End of Session Patient Position: Up in chair, Alarm on     PT Plan  Treatment/Interventions: Bed mobility, Transfer training, Gait training, Balance training, Strengthening, Endurance training, Therapeutic exercise, Therapeutic activity  PT Plan: Ongoing PT  PT Frequency: BID  PT Discharge Recommendations: Low intensity level of continued care  Equipment Recommended upon Discharge: Straight cane  PT Recommended Transfer Status: Stand by assist, Assistive device  PT - OK to Discharge: Yes      General Visit Information:   PT  Visit  PT Received On: 02/07/25  Response to Previous Treatment: Patient with no complaints from previous session.  General  Reason for Referral: Recent Surgery-L4/L5 Laminectomy  Referred By:   Franky  Past Medical History Relevant to Rehab: anxiety, arthritis, chronic pain, headache, low back pain, neck pain, panic attacks  Prior to Session Communication: Bedside nurse  Patient Position Received: Bed, 3 rail up, Alarm off, not on at start of session  Preferred Learning Style: verbal, visual  General Comment: Agreeable to treatment.    Subjective   Precautions:  Precautions  Hearing/Visual Limitations: vision and hearing WFL  Medical Precautions: Fall precautions  Post-Surgical Precautions: Spinal precautions  Precautions Comment: Reviewed spinal precautions with pt.        Objective   Pain:  Pain Assessment  Pain Assessment: 0-10  0-10 (Numeric) Pain Score: 3  Pain Type: Surgical pain  Pain Location: Back  Pain Orientation: Lower  Pain Interventions: Repositioned, Ambulation/increased activity  Response to Interventions: Resting quietly    Cognition:  Cognition  Overall Cognitive Status: Within Functional Limits  Orientation Level: Oriented X4     Treatments:  Therapeutic Exercise  Therapeutic Exercise Performed: Yes  Therapeutic Exercise Activity 1: Pt performed seated bilat LE ankle pumps, heel raises, glute sets, LAQ, lacho hip adduction and resisted hip abduction x15 reps each.    Bed Mobility 1  Bed Mobility 1: Supine to sitting, Log roll  Level of Assistance 1: Close supervision, Minimal verbal cues  Bed Mobility Comments 1: Verbal cues for log roll    Ambulation/Gait Training 1  Surface 1: Level tile  Device 1: Single point cane  Assistance 1: Close supervision  Quality of Gait 1: Wide base of support, Diminished heel strike, Decreased step length  Comments/Distance (ft) 1: Pt ambulated with straight cane ~120' x1 close supervision. Pt ambulated at slow pace, slightly wide LENCHO. Mod guarding. No LOB or LE buckling.    Transfer 1  Transfer From 1: Bed to  Transfer to 1: Stand  Technique 1: Sit to stand  Transfer Device 1: Cane  Transfer Level of Assistance 1: Contact guard  Trials/Comments 1: Verbal  cues to push from bed sit to stand.  Transfers 2  Transfer From 2: Stand to  Transfer to 2: Sit, Chair with arms  Technique 2: Stand to sit  Transfer Device 2: Cane  Transfer Level of Assistance 2: Close supervision  Trials/Comments 2: Verbal cues for fully backing up to chair and to reach back for chair arms.    Outcome Measures:  [facility] Basic Mobility  Turning from your back to your side while in a flat bed without using bedrails: A little  Moving from lying on your back to sitting on the side of a flat bed without using bedrails: A little  Moving to and from bed to chair (including a wheelchair): A little  Standing up from a chair using your arms (e.g. wheelchair or bedside chair): A little  To walk in hospital room: A little  Climbing 3-5 steps with railing: A little  Basic Mobility - Total Score: 18    Education Documentation  Handouts, taught by Lashawn aSldana PTA at 2/7/2025  2:42 PM.  Learner: Patient  Readiness: Acceptance  Method: Explanation  Response: Verbalizes Understanding  Comment: Safety with mobility, spinal precautions.    Precautions, taught by Lashawn Saldana PTA at 2/7/2025  2:42 PM.  Learner: Patient  Readiness: Acceptance  Method: Explanation  Response: Verbalizes Understanding  Comment: Safety with mobility, spinal precautions.    Body Mechanics, taught by Lashawn Saldana PTA at 2/7/2025  2:42 PM.  Learner: Patient  Readiness: Acceptance  Method: Explanation  Response: Verbalizes Understanding  Comment: Safety with mobility, spinal precautions.    Home Exercise Program, taught by Lashawn Saldana PTA at 2/7/2025  2:42 PM.  Learner: Patient  Readiness: Acceptance  Method: Explanation  Response: Verbalizes Understanding  Comment: Safety with mobility, spinal precautions.    Mobility Training, taught by Lashawn Saldana PTA at 2/7/2025  2:42 PM.  Learner: Patient  Readiness: Acceptance  Method: Explanation  Response: Verbalizes Understanding  Comment: Safety with mobility, spinal precautions.    Education  Comments  No comments found.        OP EDUCATION:       Encounter Problems       Encounter Problems (Active)       Balance       Sitting Balance (Progressing)       Start:  02/06/25    Expected End:  02/20/25       Pt will demonstrate good sitting balance to promote safe engagement with out of bed activities           Standing Balance (Progressing)       Start:  02/06/25    Expected End:  02/20/25       Pt will demonstrate good static standing balance to promote safe participation with out of bed activity, transfers, and mobility              Mobility       Ambulation (Progressing)       Start:  02/06/25    Expected End:  02/20/25       Pt will ambulate 50' modified independent assist with LRD to promote safe home mobility           Entry Stair Negotiation (Progressing)       Start:  02/06/25    Expected End:  02/20/25       Pt will ascend/descend 3 stairs with rail(s) on R and modified independent assist to promote safe entry and exit in home environment                PT Transfers       Supine to sit via log roll (Progressing)       Start:  02/06/25    Expected End:  02/20/25       Pt will transfer supine to sitting at edge of bed with modified independent assist to promote acute care out of bed activity           Sit to stand (Progressing)       Start:  02/06/25    Expected End:  02/20/25       Pt will transfer sit to standing position with modified independent assist and LRD to promote safe out of bed activity           Bed to chair (Progressing)       Start:  02/06/25    Expected End:  02/20/25       Pt will transfer from sitting edge of bed to the chair with modified independent assist and LRD to promote out of bed activity and reduce the risks of prolonged acute care bedrest              Safety       Safe Mobility Techniques (Progressing)       Start:  02/06/25    Expected End:  02/20/25       Pt will correctly identify and demonstrate safe mobility techniques to reduce their risks for falls during their  acute care stay            Spine Precaution (Progressing)       Start:  02/06/25    Expected End:  02/20/25       Pt will be independent with both understanding and demonstration of post op spine restrictions to promote safe functional mobility at discharge                 Encounter Problems (Resolved)       Pain - Adult

## 2025-02-07 NOTE — PROGRESS NOTES
Occupational Therapy    OT Treatment    Patient Name: Chris Rivas  MRN: [id number]  Department: [address]  Room: [address] [place] [address]  Today's Date: 2/7/2025  Time Calculation  Start Time: 1131  Stop Time: 1200  Time Calculation (min): 29 min        Assessment:  OT Assessment: Pt demonstrates improved transfers and mobility, should continue to progess well for discharge home.  Prognosis: Excellent  Barriers to Discharge Home: Caregiver assistance, Physical needs  Caregiver Assistance: Patient lives alone and/or does not have reliable caregiver assistance  Physical Needs: Intermittent mobility assistance needed, Intermittent ADL assistance needed, High falls risk due to function or environment  Evaluation/Treatment Tolerance: Patient tolerated treatment well  End of Session Communication: Bedside nurse  End of Session Patient Position: Bed, 3 rail up (All needs in reach.)  OT Assessment Results: Decreased ADL status, Decreased upper extremity strength, Decreased safe judgment during ADL, Decreased endurance, Decreased functional mobility, Decreased IADLs  Prognosis: Excellent  Evaluation/Treatment Tolerance: Patient tolerated treatment well  Strengths: Ability to acquire knowledge, Attitude of self, Premorbid level of function  Plan:  Treatment Interventions: ADL retraining, Functional transfer training, UE strengthening/ROM, Endurance training, Patient/family training, Equipment evaluation/education, Neuromuscular reeducation, Compensatory technique education  OT Frequency: 4 times per week  OT Discharge Recommendations: Low intensity level of continued care (anticipate low needs as mobility/ADL completion improves.  needed assist x2 on 2/6 with notable Lt knee buckling.  will reassess at next f/u visit.)  Equipment Recommended upon Discharge: Wheeled walker  OT Recommended Transfer Status: Assist of 2  OT - OK to Discharge: Yes  Treatment Interventions: ADL retraining, Functional transfer training, UE strengthening/ROM,  Endurance training, Patient/family training, Equipment evaluation/education, Neuromuscular reeducation, Compensatory technique education    Subjective   Previous Visit Info:  OT Last Visit  OT Received On: 02/07/25  General:  General  Reason for Referral: Recent Surgery-L4/L5 Laminectomy  Referred By: Dr. Wilkins  Past Medical History Relevant to Rehab: anxiety, arthritis, chronic pain, headache, low back pain, neck pain, panic attacks  Prior to Session Communication: Bedside nurse  Patient Position Received: Up in chair, Alarm on  General Comment: Agreeable to treatment.  Precautions:  Hearing/Visual Limitations: vision and hearing WFL  Medical Precautions: Fall precautions  Post-Surgical Precautions: Spinal precautions  Precautions Comment: Spine precaution handout reviewed and provided to pt.    Pain:  Pain Assessment  Pain Assessment: 0-10  0-10 (Numeric) Pain Score: 3  Pain Type: Surgical pain  Pain Location: Back  Pain Orientation: Mid, Lower  Pain Interventions: Repositioned  Response to Interventions: Content/relaxed    Objective    Cognition:  Cognition  Overall Cognitive Status: Within Functional Limits  Orientation Level: Oriented X4  Coordination:     Activities of Daily Living: Grooming  Grooming Comments: completed earlier    LE Dressing  LE Dressing: Yes  Pants Level of Assistance: Contact guard  Sock Level of Assistance: Close supervision, Setup  LE Dressing Where Assessed: Chair  LE Dressing Comments: pt doffed/donned socks, donned pants using AE. Therapist instructs in safety, correct form, effective use of AE with LB dressing. Therapist provides purchase info for sock aid, issues reacher form home.  Functional Standing Tolerance:     Bed Mobility/Transfers: Bed Mobility 1  Bed Mobility 1: Sitting to supine  Level of Assistance 1: Close supervision, Minimal verbal cues  Bed Mobility Comments 1: log roll with good demo of technique    Transfer 1  Transfer From 1: Chair with arms to  Transfer to 1:  Chair with arms  Technique 1: Sit to stand, Stand to sit  Transfer Device 1: Cane  Transfer Level of Assistance 1: Contact guard  Trials/Comments 1: sit/stand during dressing task  Transfers 2  Transfer From 2: Chair with arms to  Transfer to 2: Bed  Technique 2: To right, To left  Transfer Device 2: Cane  Transfer Level of Assistance 2: Close supervision  Trials/Comments 2: good demo of transfer technique      Functional Mobility:  Functional Mobility 1  Surface 1: Level tile  Device 1: Single point cane  Assistance 1: Contact guard  Quality of Functional Mobility 1:  (steady pace, fair balance, cautious movement. Significant improvement from previous session.)  Comments 1: short household distance in room    Outcome Measures:[facility] Daily Activity  Putting on and taking off regular lower body clothing: A little  Bathing (including washing, rinsing, drying): A little  Putting on and taking off regular upper body clothing: A little  Toileting, which includes using toilet, bedpan or urinal: A little  Taking care of personal grooming such as brushing teeth: None  Eating Meals: None  Daily Activity - Total Score: 20      Education Documentation  Handouts, taught by RAMON Stauffer at 2/7/2025  1:04 PM.  Learner: Patient  Readiness: Acceptance  Method: Explanation, Demonstration, Handout  Response: Verbalizes Understanding, Demonstrated Understanding  Comment: spine precautions with bed mobility, LB dressing, and mobility.    Precautions, taught by RAMON Stauffer at 2/7/2025  1:04 PM.  Learner: Patient  Readiness: Acceptance  Method: Explanation, Demonstration, Handout  Response: Verbalizes Understanding, Demonstrated Understanding  Comment: spine precautions with bed mobility, LB dressing, and mobility.    ADL Training, taught by RAMON Stauffer at 2/7/2025  1:04 PM.  Learner: Patient  Readiness: Acceptance  Method: Explanation, Demonstration, Handout  Response: Verbalizes Understanding, Demonstrated  Understanding  Comment: spine precautions with bed mobility, LB dressing, and mobility.    Education Comments  No comments found.      Goals:  Encounter Problems       Encounter Problems (Active)       OT Goals       ADLs (Progressing)       Start:  02/06/25    Expected End:  02/20/25       Patient will complete ADL tasks, with set-up using AE as needed in order to increase patient's safety and independence with self-care tasks.           Functional Transfers (Progressing)       Start:  02/06/25    Expected End:  02/20/25       Patient will complete functional transfers with set-up using least restrictive device, in order to increase patient's safety and independence with daily tasks.           Activity Tolerance (Progressing)       Start:  02/06/25    Expected End:  02/20/25       Patient will demonstrate the ability to participate in functional activity at least >/= 20 minutes in order to increase patient's safety and independence with daily tasks.           Precautions (Progressing)       Start:  02/06/25    Expected End:  02/20/25       Patient will independently verbalize 3/3 post op spine precautions in preparation for ADLs.

## 2025-02-07 NOTE — PROGRESS NOTES
Orthopaedic Spine Surgery Progress Note    Patient ID: Chris Rivas is a 54 y.o. male.  POD1 from L4-5 laminectomy, right L5-S1 hemilaminectomy/foraminotomy    Subjective  Lying in bed comfortably. Was able to ambulate up to chair yesterday. Still needs to work with PT today. Reports persistent pre-operative numbness in bilateral feet, worse in the left. Stable from pre-op. Does endorse improved BLE pain when pivoting/standing.       Vitals & Measurements  Vitals:    02/07/25 0732   BP: 117/63   Pulse: 56   Resp: 18   Temp: 36.3 °C (97.3 °F)   SpO2: 96%        Ortho Exam  General: AO x 3, NAD  Cardio: examined extremities perfused by peripheral palpation  Resp: breathing unlabored    Lower Extremity  Right  Left  IP   5/5  5/5  Quadriceps  5/5  5/5  Tibialis anterior  5/5  5/5  EHL   5/5  5/5  Gastrocnemius  5/5  5/5    Sensation: Normal to light touch throughout upper and lower extremities bilaterally, save for numbness in bilateral feet L>R.         Assessment/Plan  Chris Rivas is a 54 y.o. male POD1 s/p L4-5 laminectomy and right L5-S1 hemilaminotomy and foraminotomy. Doing well post-op.     - Admitted to orthopaedic spine surgery service  - Internal medicine co-management consultation service, appreciate recs.   - BMP/CBC reviewed. Hyperglycemia/mild leukocytosis likely from steroids.   - Ancef IV 2 g Q8H x24 hours  - VTE PPX: SCDs only, mobilization  - Drain: HV x1 removed this AM  - Dressing: Dry dressings x24-48 hours, then okay to leave incision open to air  - Diet: regular diet  - Campbell catheter removed  - Decadron 10 mg Q8H x24 hours  - PT/OT - mobilize ad billy. No heavy lifting >10 lbs, bending, or twisting  - Brace: none  - CM consultation for dispo, anticipate DC to home today    Rx e-scripted  Post-op instructions provided  Post-op appointment scheduled    --    Crow Wilkins MD  Orthopaedic Spine Surgery  , Department of Orthopaedic Surgery  [facility]  [facility]

## 2025-02-07 NOTE — DISCHARGE SUMMARY
Crow Wilkins MD  Orthopaedic Spine Surgery   Phone: [phone]    Appt line: [phone]             Discharge Summary    Discharge Diagnosis  1. Lumbar radiculopathy  HYDROcodone-acetaminophen (Norco) 5-325 mg tablet    docusate sodium (Colace) 100 mg capsule      2. Lumbar stenosis with neurogenic claudication        3. Degenerative lumbar spinal stenosis  methocarbamol (Robaxin) 500 mg tablet            Issues Requiring Follow-Up  None    Test Results Pending At Discharge  Pending Labs       No current pending labs.          Hospital Course  Patient underwent L4-5 laminectomy and right L5-S1 hemilaminectomy/foraminotomy on 2/6/25 without complications. The patient was then taken to the PACU in stable condition. Patient was then transferred to the or.  Pain was appropriately controlled. Diet was advanced as tolerated. A drain was placed intra-operatively which was removed on POD1 without issue. Patient progressed adequately through their recovery during hospital stay including PT/ OT and were recommended for discharge. Patient was then discharged to home in stable condition. Patient had uneventful hospital course. Patient was instructed on the use of pain medications as needed for pain. The signs and symptoms of infection were discussed and the patient was given our number to call should they have any questions or concerns following discharge.    Based on my clinical judgment, the patient was provided with a 7-day prescription for opioid medication at 30 MED, indicated for treatment of acute pain in the setting of recent spine surgery. OARRS report was run and has demonstrated an appropriate time course.  The patient has been provided with counseling pertaining to safe use of opioid medication.    Patient may weightbear as tolerated with use of assistive devices as needed for assistance with ambulation. They will keep dry dressings on their incision until dry (usually post-operative day 2-3)  after which incision may be left open to air. If they have Prineo skin mesh, this will remain on until absorption. They can shower once their incision is dry. DO NOT remove the Prineo mesh on the incision. Recommend frequent mobilization to avoid development of blood clots (DVTs).     Patient is to follow-up in 3 weeks at scheduled post-op visit.     Please refer to most recent progress note for subjective and objective data, including physical examination.        Home Medications  Scheduled medications    Current Facility-Administered Medications:     albuterol 2.5 mg /3 mL (0.083 %) nebulizer solution 2.5 mg, 2.5 mg, nebulization, q4h PRN, Crow WHIPPLE MD    bisacodyl (Dulcolax) EC tablet 10 mg, 10 mg, oral, Daily PRN, Crow WHIPPLE MD    dextrose 50 % injection 12.5 g, 12.5 g, intravenous, q15 min PRN, Crow WHIPPLE MD    dextrose 50 % injection 25 g, 25 g, intravenous, q15 min PRN, Crow WHIPPLE MD    gabapentin (Neurontin) tablet 600 mg, 600 mg, oral, TID, Crow WHIPPLE MD, 600 mg at 02/06/25 2026    glucagon (Glucagen) injection 1 mg, 1 mg, intramuscular, q15 min PRN, Crow WHIPPLE MD    glucagon (Glucagen) injection 1 mg, 1 mg, intramuscular, q15 min PRN, Crow WHIPPLE MD    HYDROcodone-acetaminophen (Norco)  mg per tablet 1 tablet, 1 tablet, oral, q4h PRN, Crow WHIPPLE MD    HYDROcodone-acetaminophen (Norco) 5-325 mg per tablet 1 tablet, 1 tablet, oral, q4h PRN, Crow WHIPPLE MD, 1 tablet at 02/06/25 1517    HYDROmorphone (Dilaudid) injection 0.4 mg, 0.4 mg, intravenous, q2h PRN, Crow WHIPPLE MD    ketorolac (Toradol) injection 15 mg, 15 mg, intravenous, q6h KING, Crow WHIPPLE MD, 15 mg at 02/07/25 0621    lactated Ringer's infusion, 100 mL/hr, intravenous, Continuous, Crow WHIPPLE MD, Last Rate: 100 mL/hr at 02/06/25 2239, 100 mL/hr at 02/06/25 2239    methocarbamol (Robaxin) tablet 1,000 mg, 1,000 mg, oral, TID, Crow WHIPPLE MD, 1,000 mg at  02/06/25 2026    naloxone (Narcan) injection 0.2 mg, 0.2 mg, intravenous, q5 min PRN, Crow WHIPPLE MD    nicotine polacrilex (Nicorette) gum 2 mg, 2 mg, Mouth/Throat, q1h PRN, Aurelia Lebron, MORRIS-CNP    ondansetron ODT (Zofran-ODT) disintegrating tablet 4 mg, 4 mg, oral, q8h PRN **OR** ondansetron (Zofran) injection 4 mg, 4 mg, intravenous, q8h PRN, Crow WHIPPLE MD    oxygen (O2) therapy, , inhalation, Continuous PRN - O2/gases, Crow WHIPPLE MD, Last Rate: 120,000 mL/hr at 02/06/25 1252, 2 L/min at 02/06/25 1252    PARoxetine (Paxil) tablet 10 mg, 10 mg, oral, Nightly, Crow WHIPPLE MD, 10 mg at 02/06/25 2026    polyethylene glycol (Glycolax, Miralax) packet 17 g, 17 g, oral, Daily, Crow WHIPPLE MD    prochlorperazine (Compazine) tablet 10 mg, 10 mg, oral, q6h PRN **OR** prochlorperazine (Compazine) injection 10 mg, 10 mg, intravenous, q6h PRN **OR** prochlorperazine (Compazine) suppository 25 mg, 25 mg, rectal, q12h PRN, Crow WHIPPLE MD     PRN medications  PRN medications: albuterol, bisacodyl, dextrose, dextrose, glucagon, glucagon, HYDROcodone-acetaminophen, HYDROcodone-acetaminophen, HYDROmorphone, naloxone, nicotine polacrilex, ondansetron ODT **OR** ondansetron, oxygen, prochlorperazine **OR** prochlorperazine **OR** prochlorperazine    Discharge medications     Your medication list        START taking these medications        Instructions Last Dose Given Next Dose Due   docusate sodium 100 mg capsule  Commonly known as: Colace      Take 1 capsule (100 mg) by mouth 2 times a day as needed for constipation.       HYDROcodone-acetaminophen 5-325 mg tablet  Commonly known as: Norco      Take 1-2 tablets by mouth every 6 hours if needed (1 tablet for moderate, 2 tablets for severe).              CHANGE how you take these medications        Instructions Last Dose Given Next Dose Due   methocarbamol 500 mg tablet  Commonly known as: Robaxin  What changed:   when to take this  reasons  to take this      Take 1 tablet (500 mg) by mouth 4 times a day as needed for muscle spasms.              CONTINUE taking these medications        Instructions Last Dose Given Next Dose Due   albuterol 90 mcg/actuation inhaler      Inhale 2 puffs every 4 hours if needed for wheezing.       VI-SELTZER SEVERE COLD ORAL           gabapentin 600 mg tablet  Commonly known as: Neurontin      Take 1 tablet (600 mg) by mouth 3 times a day.       nicotine polacrilex 4 mg gum  Commonly known as: Nicorette      Chew 1 each (4 mg) every 2 hours if needed for smoking cessation.       PARoxetine 10 mg tablet  Commonly known as: Paxil      TAKE ONE TABLET BY MOUTH ONCE DAILY AT BEDTIME              STOP taking these medications      chlorhexidine 0.12 % solution  Commonly known as: Peridex        diclofenac 75 mg EC tablet  Commonly known as: Voltaren               ASK your doctor about these medications        Instructions Last Dose Given Next Dose Due   lidocaine-menthol 4-1 % adhesive patch,medicated           LORazepam 0.5 mg tablet  Commonly known as: Ativan      Take 1 tablet (0.5 mg) by mouth 2 times a day as needed for anxiety.       Vitamin B-12 1,000 mcg tablet  Generic drug: cyanocobalamin                     Where to Get Your Medications        These medications were sent to [facility]  [address], [address], [facility] [address]      Hours: 9 AM to 6 PM Mon-Fri, 9 AM to 1 PM Sat Phone: [phone]   docusate sodium 100 mg capsule  HYDROcodone-acetaminophen 5-325 mg tablet  methocarbamol 500 mg tablet              Outpatient Follow-Up  Patient to follow-up with Dr. Wilkins as scheduled.  Thank you for trusting us with your care. You should be scheduled for a follow-up post-surgical visit in 3 weeks.    Special Instructions  None    Please read discharge instructions provided by your surgeon before calling with questions as this will delay care.

## 2025-02-07 NOTE — PROGRESS NOTES
02/07/25 1454   Discharge Planning   Living Arrangements Alone   Support Systems Family members;Friends/neighbors   Assistance Needed independent   Type of Residence Private residence   Number of Stairs to Enter Residence 3   Number of Stairs Within Residence 0   Do you have animals or pets at home? No   Who is requesting discharge planning? Patient   Home or Post Acute Services None   Expected Discharge Disposition Home   Does the patient need discharge transport arranged? No     Home no needs

## 2025-02-07 NOTE — PROGRESS NOTES
"Chris Rivas is a 54 y.o. male on day 0 of admission presenting with Lumbar radiculopathy.    Subjective   Pt seen and examined.  No documented concerns/events overnight from nursing.  Afebrile overnight.  Denies chills.  Denies any chest pain, palpitations, dyspnea.        Objective     Physical Exam  Constitutional:       General: He is not in acute distress.     Appearance: He is not ill-appearing or toxic-appearing.   Cardiovascular:      Rate and Rhythm: Normal rate and regular rhythm.      Pulses: Normal pulses.      Heart sounds: Normal heart sounds.   Pulmonary:      Effort: Pulmonary effort is normal. No respiratory distress.      Breath sounds: Normal breath sounds. No wheezing or rales.   Abdominal:      General: Bowel sounds are normal.      Palpations: Abdomen is soft.   Skin:     General: Skin is warm and dry.      Comments: Drsg lumbar spine dry and intact   Neurological:      General: No focal deficit present.      Mental Status: He is alert and oriented to person, place, and time.   Psychiatric:         Mood and Affect: Mood normal.         Behavior: Behavior normal.         Last Recorded Vitals  Blood pressure 117/63, pulse 56, temperature 36.3 °C (97.3 °F), temperature source Temporal, resp. rate 18, height 2.007 m (6' 7\"), weight 98.9 kg (218 lb), SpO2 96%.  Intake/Output last 3 Shifts:  I/O last 3 completed shifts:  In: 2311.7 (23.4 mL/kg) [P.O.:950; I.V.:1161.7 (11.7 mL/kg); IV Piggyback:200]  Out: 2090 (21.1 mL/kg) [Urine:2000 (0.6 mL/kg/hr); Drains:90]  Weight: 98.9 kg     Relevant Results    Scheduled medications  gabapentin, 600 mg, oral, TID  ketorolac, 15 mg, intravenous, q6h KING  methocarbamol, 1,000 mg, oral, TID  PARoxetine, 10 mg, oral, Nightly  polyethylene glycol, 17 g, oral, Daily      Continuous medications  lactated Ringer's, 100 mL/hr, Last Rate: 100 mL/hr (02/06/25 8767)      PRN medications  PRN medications: albuterol, bisacodyl, dextrose, dextrose, glucagon, glucagon, " HYDROcodone-acetaminophen, HYDROcodone-acetaminophen, HYDROmorphone, naloxone, nicotine polacrilex, ondansetron ODT **OR** ondansetron, oxygen, prochlorperazine **OR** prochlorperazine **OR** prochlorperazine     following data reviewed    WBC- 11.6  Hgb-11.1  Hct-35.2  Platelets-193        Na-138  K+-4.7  Cl-104  Bicarb-28  BUN-29  creatinine-0.8                              Assessment/Plan     Lumbar spine sx  -DVT prophylaxis per surgeon  -pain control  -PT, OT     Anxiety  -continue paxil     Tobacco Use  -encourage cessation  -nicotine gum PRN     Plan   Pt medically stable for discharge.  Will sign off call with any questions/concerns      MORRIS Samayoa-CNP

## 2025-02-07 NOTE — CARE PLAN
The patient's goals for the shift include  manage pain, comfort and safety, therapy, rest, discharge home.     The clinical goals for the shift include Pain control, monitor labs and VS, IV fluids and antibx, PT/OT, maintain safety, no falls, promote rest, discharge planning, monitor output, discharge home.    No barriers toward meeting these goals. Patient to be discharged home following his PM therapy session. No additional needs at this time.       Problem: Pain - Adult  Goal: Verbalizes/displays adequate comfort level or baseline comfort level  Outcome: Adequate for Discharge     Problem: Safety - Adult  Goal: Free from fall injury  Outcome: Adequate for Discharge     Problem: Discharge Planning  Goal: Discharge to home or other facility with appropriate resources  Outcome: Adequate for Discharge     Problem: Chronic Conditions and Co-morbidities  Goal: Patient's chronic conditions and co-morbidity symptoms are monitored and maintained or improved  Outcome: Adequate for Discharge     Problem: Nutrition  Goal: Nutrient intake appropriate for maintaining nutritional needs  Outcome: Adequate for Discharge     Problem: Fall/Injury  Goal: Not fall by end of shift  Outcome: Adequate for Discharge  Goal: Be free from injury by end of the shift  Outcome: Adequate for Discharge  Goal: Verbalize understanding of personal risk factors for fall in the hospital  Outcome: Adequate for Discharge  Goal: Verbalize understanding of risk factor reduction measures to prevent injury from fall in the home  Outcome: Adequate for Discharge  Goal: Use assistive devices by end of the shift  Outcome: Adequate for Discharge  Goal: Pace activities to prevent fatigue by end of the shift  Outcome: Adequate for Discharge     Problem: Pain  Goal: Takes deep breaths with improved pain control throughout the shift  Outcome: Adequate for Discharge  Goal: Turns in bed with improved pain control throughout the shift  Outcome: Adequate for  Discharge  Goal: Walks with improved pain control throughout the shift  Outcome: Adequate for Discharge  Goal: Performs ADL's with improved pain control throughout shift  Outcome: Adequate for Discharge  Goal: Participates in PT with improved pain control throughout the shift  Outcome: Adequate for Discharge  Goal: Free from opioid side effects throughout the shift  Outcome: Adequate for Discharge  Goal: Free from acute confusion related to pain meds throughout the shift  Outcome: Adequate for Discharge

## 2025-02-10 ENCOUNTER — TELEPHONE (OUTPATIENT)
Dept: PRIMARY CARE | Facility: CLINIC | Age: 55
End: 2025-02-10
Payer: COMMERCIAL

## 2025-02-10 NOTE — TELEPHONE ENCOUNTER
[employer] paperwork 1/21 - 2/6 , they are requiring progress notes, labs, diagnosis, and estimate time of return to work, Claim # [id number], please fax over and notify pt, he had surgery 2/6/25, Please advice and call back [phone].

## 2025-02-12 ENCOUNTER — APPOINTMENT (OUTPATIENT)
Dept: NEUROLOGY | Facility: CLINIC | Age: 55
End: 2025-02-12
Payer: COMMERCIAL

## 2025-02-12 VITALS
SYSTOLIC BLOOD PRESSURE: 104 MMHG | DIASTOLIC BLOOD PRESSURE: 78 MMHG | HEART RATE: 71 BPM | WEIGHT: 219 LBS | HEIGHT: 78 IN | BODY MASS INDEX: 25.34 KG/M2

## 2025-02-12 DIAGNOSIS — R25.3 MUSCLE TWITCHING: ICD-10-CM

## 2025-02-12 PROCEDURE — 3008F BODY MASS INDEX DOCD: CPT | Performed by: PSYCHIATRY & NEUROLOGY

## 2025-02-12 PROCEDURE — 99204 OFFICE O/P NEW MOD 45 MIN: CPT | Performed by: PSYCHIATRY & NEUROLOGY

## 2025-02-12 PROCEDURE — 1036F TOBACCO NON-USER: CPT | Performed by: PSYCHIATRY & NEUROLOGY

## 2025-02-12 ASSESSMENT — ENCOUNTER SYMPTOMS
NUMBNESS: 1
DIZZINESS: 1

## 2025-02-12 NOTE — PROGRESS NOTES
Subjective   Chris Rivas is a 54 y.o.   male.  Dizziness  Associated symptoms include numbness.   Neuropathy        This is a 54 year old man with involuntary movements, referred by Dr. Gomez.  Symptoms started 9 months ago.  It can be the entire upper body, few seconds, if the head- momentary jerk either way.  He had lumbar spine surgery 2/6/25- had lumbar stenosis with neurogenic claudication.  He had a L4-5 laminectomy and right L5-S1 hemilaminectomy/foraminotomy on 2/6/25 without complications, surgery performed by Dr. Wilkins at [facility].  He works at a [employer].  His involuntary movements are sporadic, not triggered by anything.  He broke his neck in 2004- no surgery needed.  He has been on gabapentin 2 years.  Objective   Neurological Exam  Mental Status  Awake. Oriented to person, place and time. Recent and remote memory are intact. Speech is normal. Language is fluent with no aphasia.    Cranial Nerves  CN II: Visual fields full to confrontation.  CN III, IV, VI: Extraocular movements intact bilaterally.   Right pupil: 3 mm. Round. Reactive to light.   Left pupil: 3 mm. Round. Reactive to light.  CN VII: Full and symmetric facial movement.  CN IX, X: Palate elevates symmetrically  CN XII: Tongue midline without atrophy or fasciculations.    Motor  Normal muscle bulk throughout. No fasciculations present. Normal muscle tone. No abnormal involuntary movements. Strength is 5/5 in all four extremities except as noted.  Slow, difficulty lifting his legs and walking, due to residual lower back pain..    Sensory  Light touch abnormality: Pinprick abnormality: Proprioception abnormality: Decreased sensation in the right foot.     Reflexes                                            Right                      Left  Biceps                                 0                         0  Patellar                                0                         0  Achilles                                0                          0  Right Plantar: downgoing  Left Plantar: downgoing    Coordination  Right: Finger-to-nose normal.Left: Finger-to-nose normal.    Gait  Casual gait is normal including stance, stride, and arm swing.  Slow, due to LBP.    Physical Exam  Constitutional:       General: He is awake.   Eyes:      Extraocular Movements: Extraocular movements intact.   Neurological:      Deep Tendon Reflexes:      Reflex Scores:       Bicep reflexes are 0 on the right side and 0 on the left side.       Patellar reflexes are 0 on the right side and 0 on the left side.       Achilles reflexes are 0 on the right side and 0 on the left side.  Psychiatric:         Speech: Speech normal.       I personally reviewed laboratory, radiographic, and medical studies which were pertinent for nothing.    Assessment/Plan

## 2025-02-12 NOTE — PATIENT INSTRUCTIONS
You likely are having tics- involuntary movements.  As discussed, treatment is optional.  If you would like treatment, I could refer you to a Movement Disorders specialist.  I do not think any of the medications you have been on would cause the involuntary movements.

## 2025-02-13 DIAGNOSIS — M54.16 LUMBAR RADICULOPATHY: ICD-10-CM

## 2025-02-13 RX ORDER — HYDROCODONE BITARTRATE AND ACETAMINOPHEN 5; 325 MG/1; MG/1
1-2 TABLET ORAL EVERY 6 HOURS PRN
Qty: 28 TABLET | Refills: 0 | Status: SHIPPED | OUTPATIENT
Start: 2025-02-13

## 2025-02-19 ENCOUNTER — HOSPITAL ENCOUNTER (OUTPATIENT)
Dept: RADIOLOGY | Facility: CLINIC | Age: 55
Discharge: HOME | End: 2025-02-19
Payer: COMMERCIAL

## 2025-02-19 ENCOUNTER — OFFICE VISIT (OUTPATIENT)
Dept: ORTHOPEDIC SURGERY | Facility: CLINIC | Age: 55
End: 2025-02-19
Payer: COMMERCIAL

## 2025-02-19 VITALS — WEIGHT: 219 LBS | HEIGHT: 78 IN | BODY MASS INDEX: 25.34 KG/M2

## 2025-02-19 DIAGNOSIS — Z98.890 HISTORY OF BACK SURGERY: ICD-10-CM

## 2025-02-19 DIAGNOSIS — Z98.890 S/P LAMINECTOMY: Primary | ICD-10-CM

## 2025-02-19 DIAGNOSIS — M48.061 DEGENERATIVE LUMBAR SPINAL STENOSIS: ICD-10-CM

## 2025-02-19 PROCEDURE — 99211 OFF/OP EST MAY X REQ PHY/QHP: CPT | Performed by: STUDENT IN AN ORGANIZED HEALTH CARE EDUCATION/TRAINING PROGRAM

## 2025-02-19 PROCEDURE — 72100 X-RAY EXAM L-S SPINE 2/3 VWS: CPT

## 2025-02-19 PROCEDURE — 3008F BODY MASS INDEX DOCD: CPT | Performed by: STUDENT IN AN ORGANIZED HEALTH CARE EDUCATION/TRAINING PROGRAM

## 2025-02-19 PROCEDURE — 1036F TOBACCO NON-USER: CPT | Performed by: STUDENT IN AN ORGANIZED HEALTH CARE EDUCATION/TRAINING PROGRAM

## 2025-02-19 RX ORDER — METHOCARBAMOL 500 MG/1
500 TABLET, FILM COATED ORAL 4 TIMES DAILY PRN
Qty: 30 TABLET | Refills: 0 | Status: SHIPPED | OUTPATIENT
Start: 2025-02-19

## 2025-02-19 RX ORDER — METHYLPREDNISOLONE 4 MG/1
TABLET ORAL
Qty: 21 TABLET | Refills: 0 | Status: CANCELLED | OUTPATIENT
Start: 2025-02-19 | End: 2025-02-25

## 2025-02-19 RX ORDER — METHOCARBAMOL 500 MG/1
500 TABLET, FILM COATED ORAL 4 TIMES DAILY PRN
Qty: 30 TABLET | Refills: 0 | Status: CANCELLED | OUTPATIENT
Start: 2025-02-19

## 2025-02-19 RX ORDER — METHYLPREDNISOLONE 4 MG/1
TABLET ORAL
Qty: 21 TABLET | Refills: 0 | Status: SHIPPED | OUTPATIENT
Start: 2025-02-19 | End: 2025-02-25

## 2025-02-19 ASSESSMENT — PAIN - FUNCTIONAL ASSESSMENT: PAIN_FUNCTIONAL_ASSESSMENT: 0-10

## 2025-02-19 ASSESSMENT — PAIN SCALES - GENERAL: PAINLEVEL_OUTOF10: 7

## 2025-02-19 NOTE — PROGRESS NOTES
Orthopaedic Spine Surgery Clinic Note    Patient ID: Chris Rivas is a 54 y.o. male.    2 weeks s/p L4-5 laminectomy, left L5-S1 hemilaminectomy/foraminotomy on 2/6/25.     Mr. Rivas presents for outpatient follow-up.  He overall has been doing okay.  He does report improved bilateral lower extremity radiating pains particular with ambulation.  He is still experiencing some numbness of his left lower extremity which was present preoperatively.  He has had some ups and downs with recovering from surgery with regards to low back pain.  He is also experiencing some flares of neck and upper extremity pain as well.  He does think that more recently he has had a couple rough nights due to him being a little bit more active with ADLs.  He also admits that to some extent he might be stressed out as he is waiting for his short-term disability payments to come through.  He did confirm with me that our office completed all the necessary paperwork.  He is also going stir crazy to some extent as he has been off of work completely since the surgery, and he has been very active and busy with work otherwise before this.  He continues to take the Norco pills as prescribed, stating that they do help when he takes them.  He is almost out of the Robaxin tablets.    Vitals & Measurements  There were no vitals filed for this visit.     Ortho Exam  General: AO x 3, NAD  Cardio: examined extremities perfused by peripheral palpation  Resp: breathing unlabored  Gait: within normal limits, non-antalgic  Posterior lumbar incision well-approximated with overlying Prineo mesh intact.  No concerns for dehiscence.  No active drainage.  No surrounding infective changes.    Lower Extremity  Right  Left  IP   5/5  5/5  Quadriceps  5/5  5/5  Tibialis anterior  5/5  5/5  EHL   5/5  5/5  Gastrocnemius  5/5  5/5    Sensation: Baseline numbness in bilateral feet L>R        Diagnostic Results - Imaging    XR lumbar spine today, 2/19/2025  Official read  pending.  New upright AP, lateral radiographs of the lumbar spine were obtained in the office today.  These images are of good quality.  Demonstrated on coronal view are changes consistent with an L4-5 laminectomy and left L5-S1 hemilaminectomy.  There is no interval development of spondylolisthesis or disc collapse.  There is a slight linearity apparent on the sagittal profile across the L4 pars, however this does not correlate with a similar linearity on the coronal view.  There is some suggestion of this linearity on his prior XRs available in the system as well.  This likely represents a projectional/chronic consideration.        Diagnosis  Encounter Diagnoses   Name Primary?    S/P laminectomy Yes    Degenerative lumbar spinal stenosis           Assessment/Plan  Mr. Rivas is a 54-year-old male who is 2 weeks s/p L4-5 laminectomy and left L5-S1 hemilaminectomy/foraminotomy.  He is overall doing okay with improved bilateral lower extremity radicular and neurogenic pains after the surgery.  He is continue to experience persistent numbness primarily of his left lower extremity which was present preoperatively.  He is also having some aches and pains throughout his body, primarily in his lower back, bilateral hips as well as his neck and his upper extremity.  The pain medications that we prescribed him postoperatively are helpful to an extent, however he states that he still has good days and bad days.  We discussed with him that he is still recovering from his surgery.  I did prescribe him a Medrol Dosepak to assist him with some of his discomforts currently.  I also refilled his Robaxin.  I asked him to keep us posted about his pain levels as he continues to recover.  His posterior lumbar incision is well-healed without any concerns for infection or dehiscence.    His XRs available today in the system do demonstrate changes consistent with his L4-5 laminectomy and left-sided L5-S1 hemilaminectomy.  He does not  have any interval spondylolisthesis or disc collapse.  We will continue to monitor his imaging and his symptoms as he continues to recover from his surgery.  He will continue no heavy lifting greater than 10 pounds, bending, or twisting with the back.    He did have some additional paperwork for me to fill out for his disability.  I placed a tentative return to work date of 3/10/2025 which would represent 9 weeks after his original surgery.  This would allow us to track his recovery at the 6-week follow-up and potentially introduce some physical therapy before he returns to light duty work.    Patient will follow-up with me in approximately 4 weeks for repeat clinical and radiographic check. After our discussion, the patient articulated understanding of the plan and felt that all questions had been answered satisfactorily. The patient was pleased with the visit and very appreciative for the care rendered.    **Please excuse any errors in grammar or translation related to this dictation. Voice recognition software was utilized to prepare this document. **    F/u 4 weeks. XRs AP and lateral lumbar spine.       Orders Placed This Encounter    XR lumbar spine 2-3 views    methocarbamol (Robaxin) 500 mg tablet    methylPREDNISolone (Medrol Dospak) 4 mg tablets       --    Crow Wilkins MD  Orthopaedic Spine Surgery  , Department of Orthopaedic Surgery  [facility]

## 2025-02-21 DIAGNOSIS — M54.16 LUMBAR RADICULOPATHY: ICD-10-CM

## 2025-02-21 RX ORDER — HYDROCODONE BITARTRATE AND ACETAMINOPHEN 5; 325 MG/1; MG/1
1-2 TABLET ORAL EVERY 6 HOURS PRN
Qty: 28 TABLET | Refills: 0 | Status: SHIPPED | OUTPATIENT
Start: 2025-02-21

## 2025-02-26 ENCOUNTER — APPOINTMENT (OUTPATIENT)
Dept: PRIMARY CARE | Facility: CLINIC | Age: 55
End: 2025-02-26
Payer: COMMERCIAL

## 2025-02-26 DIAGNOSIS — F41.0 PANIC ATTACK: ICD-10-CM

## 2025-02-26 RX ORDER — PAROXETINE 10 MG/1
10 TABLET, FILM COATED ORAL NIGHTLY
Qty: 90 TABLET | Refills: 0 | Status: SHIPPED | OUTPATIENT
Start: 2025-02-26

## 2025-03-19 ENCOUNTER — HOSPITAL ENCOUNTER (OUTPATIENT)
Dept: RADIOLOGY | Facility: CLINIC | Age: 55
Discharge: HOME | End: 2025-03-19
Payer: COMMERCIAL

## 2025-03-19 ENCOUNTER — OFFICE VISIT (OUTPATIENT)
Dept: ORTHOPEDIC SURGERY | Facility: CLINIC | Age: 55
End: 2025-03-19
Payer: COMMERCIAL

## 2025-03-19 VITALS — HEIGHT: 78 IN | WEIGHT: 219 LBS | BODY MASS INDEX: 25.34 KG/M2

## 2025-03-19 DIAGNOSIS — Z98.890 S/P LAMINECTOMY: ICD-10-CM

## 2025-03-19 DIAGNOSIS — Z98.890 S/P LAMINECTOMY: Primary | ICD-10-CM

## 2025-03-19 DIAGNOSIS — M48.061 DEGENERATIVE LUMBAR SPINAL STENOSIS: ICD-10-CM

## 2025-03-19 PROCEDURE — 72100 X-RAY EXAM L-S SPINE 2/3 VWS: CPT

## 2025-03-19 PROCEDURE — 3008F BODY MASS INDEX DOCD: CPT | Performed by: STUDENT IN AN ORGANIZED HEALTH CARE EDUCATION/TRAINING PROGRAM

## 2025-03-19 PROCEDURE — 99211 OFF/OP EST MAY X REQ PHY/QHP: CPT | Performed by: STUDENT IN AN ORGANIZED HEALTH CARE EDUCATION/TRAINING PROGRAM

## 2025-03-19 PROCEDURE — 1036F TOBACCO NON-USER: CPT | Performed by: STUDENT IN AN ORGANIZED HEALTH CARE EDUCATION/TRAINING PROGRAM

## 2025-03-19 RX ORDER — METHOCARBAMOL 500 MG/1
500 TABLET, FILM COATED ORAL 4 TIMES DAILY PRN
Qty: 30 TABLET | Refills: 0 | Status: SHIPPED | OUTPATIENT
Start: 2025-03-19

## 2025-03-19 ASSESSMENT — PAIN SCALES - GENERAL: PAINLEVEL_OUTOF10: 4

## 2025-03-19 ASSESSMENT — PAIN DESCRIPTION - DESCRIPTORS: DESCRIPTORS: SORE;DISCOMFORT

## 2025-03-19 ASSESSMENT — PAIN - FUNCTIONAL ASSESSMENT: PAIN_FUNCTIONAL_ASSESSMENT: 0-10

## 2025-03-19 NOTE — PROGRESS NOTES
Orthopaedic Spine Surgery Clinic Note    Patient ID: Chris Rivas is a 54 y.o. male.    6 weeks s/p L4-5 laminectomy, left L5-S1 hemilaminectomy/foraminotomy on 2/6/25.     Mr. Rivas presents for outpatient follow-up.  He overall has been doing okay.  He does report improved bilateral lower extremity radiating pains particular with ambulation.  He was doing very well up until 2 weeks ago when he slipped and fell directly onto his back.  Since then, he has started experiencing slightly increased pain in his lower back, particular in the left lower quadrant.  However mostly he is apprehensive about advancing activities due to concerned about injuring his lower back.  He is also experiencing some flares of neck and upper extremity pain as well.  He has weaned off of the Norco tablets and is now just taking Robaxin.    With regards to return to work, he did not believe that the provided date for 4/10/2025 will be realistic for him.  He is requesting an updated date of 5/14/2025.      Vitals & Measurements  There were no vitals filed for this visit.     Ortho Exam  General: AO x 3, NAD  Cardio: examined extremities perfused by peripheral palpation  Resp: breathing unlabored  Gait: within normal limits, non-antalgic  Posterior lumbar incision well-healed.  No concern for drainage, dehiscence, or infection.    Lower Extremity  Right  Left  IP   5/5  5/5  Quadriceps  5/5  5/5  Tibialis anterior  5/5  5/5  EHL   5/5  5/5  Gastrocnemius  5/5  5/5    Sensation: Baseline numbness in bilateral feet L>R        Diagnostic Results - Imaging    XR lumbar spine today, 3/19/2025  Official read pending.  New upright AP, lateral radiographs of the lumbar spine were obtained in the office today.  These images are of good quality.  Demonstrated on coronal view are changes consistent with an L4-5 laminectomy and left L5-S1 hemilaminectomy.  There is no interval development of spondylolisthesis or disc collapse.  There is a slight  linearity on the left-hand side and proximity to the L5 pedicle, favored to be the joint line at the left L4-5 facet.  No appreciable pars fracture.    Diagnosis  Encounter Diagnoses   Name Primary?    S/P laminectomy Yes    Degenerative lumbar spinal stenosis           Assessment/Plan  Mr. Rivas is a 54-year-old male who is 6 weeks s/p L4-5 laminectomy and left L5-S1 hemilaminectomy/foraminotomy.  He is overall doing okay with improved bilateral lower extremity radicular and neurogenic pains after the surgery.  He was doing well up until 2 weeks ago when he fell directly onto his back after a slip and fall.  Since then, he has been experiencing some increased pain particularly in the left lower quadrant of his lower back.  XR evaluation today in the office demonstrates no interval spondylolisthesis or appreciable fracture.  We recommended continuing activities as tolerated and allowing pain to be a guide.  If his symptoms fail to improve or worsen, I did ask him to reach out back to my office at which point we can order a CT scan of the lumbar spine to further evaluate for any fracture that he may have sustained after his fall.  Given the likely nondisplaced nature of a subtle fracture, we would likely continue to treat this nonsurgically.    He did have some additional paperwork for me to fill out for his disability.  I have updated his return to work date to May 14, 2025, and I have provided this to our administrative office to reflect in this paperwork.  I have also placed a referral to physical therapy to initiate a gentle low back stretching and strengthening program in order to slowly rehabilitate him back to work.  I have also placed a refill for Robaxin for him.  I also placed a message to Dr. Stark to facilitate care for his cervical spine with consideration of cervical HAYLEY's.  I did advise the patient that to consider surgery for cervical spine, he will likely need to stop smoking.  He is going to  continue working on that.    Patient will follow-up with me in approximately 6 weeks for repeat clinical and radiographic check. After our discussion, the patient articulated understanding of the plan and felt that all questions had been answered satisfactorily. The patient was pleased with the visit and very appreciative for the care rendered.    **Please excuse any errors in grammar or translation related to this dictation. Voice recognition software was utilized to prepare this document. **    F/u 6 weeks. XRs AP and lateral lumbar spine.       Orders Placed This Encounter    XR lumbar spine 2-3 views    methocarbamol (Robaxin) 500 mg tablet       --    Crow Wilkins MD  Orthopaedic Spine Surgery  , Department of Orthopaedic Surgery  [facility]

## 2025-03-20 ENCOUNTER — OFFICE VISIT (OUTPATIENT)
Dept: PAIN MEDICINE | Facility: CLINIC | Age: 55
End: 2025-03-20
Payer: COMMERCIAL

## 2025-03-20 VITALS
HEIGHT: 78 IN | BODY MASS INDEX: 25.34 KG/M2 | WEIGHT: 219 LBS | HEART RATE: 81 BPM | SYSTOLIC BLOOD PRESSURE: 124 MMHG | OXYGEN SATURATION: 97 % | DIASTOLIC BLOOD PRESSURE: 66 MMHG | RESPIRATION RATE: 18 BRPM

## 2025-03-20 DIAGNOSIS — M54.12 CERVICAL RADICULOPATHY: Primary | ICD-10-CM

## 2025-03-20 PROCEDURE — 3008F BODY MASS INDEX DOCD: CPT | Performed by: STUDENT IN AN ORGANIZED HEALTH CARE EDUCATION/TRAINING PROGRAM

## 2025-03-20 PROCEDURE — 1036F TOBACCO NON-USER: CPT | Performed by: STUDENT IN AN ORGANIZED HEALTH CARE EDUCATION/TRAINING PROGRAM

## 2025-03-20 PROCEDURE — G2211 COMPLEX E/M VISIT ADD ON: HCPCS | Performed by: STUDENT IN AN ORGANIZED HEALTH CARE EDUCATION/TRAINING PROGRAM

## 2025-03-20 PROCEDURE — 99214 OFFICE O/P EST MOD 30 MIN: CPT | Performed by: STUDENT IN AN ORGANIZED HEALTH CARE EDUCATION/TRAINING PROGRAM

## 2025-03-20 ASSESSMENT — PATIENT HEALTH QUESTIONNAIRE - PHQ9
SUM OF ALL RESPONSES TO PHQ9 QUESTIONS 1 AND 2: 0
1. LITTLE INTEREST OR PLEASURE IN DOING THINGS: NOT AT ALL
2. FEELING DOWN, DEPRESSED OR HOPELESS: NOT AT ALL

## 2025-03-20 ASSESSMENT — PAIN DESCRIPTION - DESCRIPTORS: DESCRIPTORS: ACHING;TIGHTNESS;THROBBING

## 2025-03-20 ASSESSMENT — PAIN SCALES - GENERAL
PAINLEVEL_OUTOF10: 6
PAINLEVEL_OUTOF10: 6

## 2025-03-20 ASSESSMENT — ENCOUNTER SYMPTOMS
LOSS OF SENSATION IN FEET: 1
DEPRESSION: 0
OCCASIONAL FEELINGS OF UNSTEADINESS: 1

## 2025-03-20 ASSESSMENT — PAIN - FUNCTIONAL ASSESSMENT: PAIN_FUNCTIONAL_ASSESSMENT: 0-10

## 2025-03-20 NOTE — H&P (VIEW-ONLY)
"[facility]  Follow Up Office Visit Note 3/20/2025    Patient Information: Chris Rivas, MRN: [id number], : 1970   Primary Care/Referring Physician: Beka Gomez, , [address] [facility] [address] / [facility] [address]     Chief Complaint: Left neck and arm pain  Interval History: He returns today for follow up regarding his left neck and shoulder/arm pain    He describes primarily left neck pain which radiates down towards his left shoulder and shoulder blade, and occasionally down into the left arm to just below the arm. He has some intermittent tingling from his left shoulder to his left elbow    Of note, in the interim he underwent L4/5 laminectomy, medial facetectomy, foraminotomies with left L5/S1 hemilaminotomy, foraminotomy in 2024    Brief History of Pain: Mr. Chris Rivas is a 54 y.o. male with a PMHx of HLD, dysphagia, anxiety who presents for evaluation of low back, neck, and shoulder pain. He states his low back is the most signficant area of pain thus will focus on that today    He reports low back pain for 25 years that has been progressively worsening. He states he 'fractured' vertebrae in his low back many years ago but did not require surgery. He describes the pain as a midline stabbing, throbbing, and grinding. His low back pain worsens with walking, twisting. The pain occasionally radiates down his legs, right moreso than the left. His entire right foot is numb, and he occasionally gets numbness further up the leg. His left foot also occasionally gets numb. His Gabapentin dose was increased recently with some improvement in his foot pain but no improvement in his low back pain. He states Diclofenac is working better than Meloxicam but he is still having quite a lot of pain    Regarding his neck and left shoulder, he states he 'broke his neck' in  after diving into a pool and was \"paralyzed from the chest down\" but did not require surgery and recovered. He has a lot of left " shoulder pain, and occasionally right shoulder pain, when trying to lift his arms above his head.    Current Pain Medications: Gabapentin 600 mg TID, Methocarbamol 500 mg TID  Previously Tried Pain Medications: Meloxicam, PO steroids, Duloxetine - caused diarrhea, Diclofenac    Relevant Surgeries: 2/2024: L4/5 laminectomy, medial facetectomy, foraminotomies with left L5/S1 hemilaminotomy, foraminotomy  Injections: L4/5 HAYLEY - no pain relief, Bilateral L4/5, L5/S1 RFA - 80% pain relief. Left subacromial bursa injection - no relief    Physical/Occupational Therapy: No recent PT for his low back    Medications:   Current Outpatient Medications   Medication Instructions    albuterol 90 mcg/actuation inhaler 2 puffs, inhalation, Every 4 hours PRN    chlorphenir/phenyleph/aspirin (VI-SELTZER SEVERE COLD ORAL) Take by mouth.    docusate sodium (COLACE) 100 mg, oral, 2 times daily PRN    gabapentin (NEURONTIN) 600 mg, oral, 3 times daily    HYDROcodone-acetaminophen (Norco) 5-325 mg tablet 1-2 tablets, oral, Every 6 hours PRN    methocarbamol (ROBAXIN) 500 mg, oral, 4 times daily PRN    nicotine polacrilex (NICORETTE) 4 mg, Mouth/Throat, Every 2 hour PRN    PARoxetine (PAXIL) 10 mg, oral, Nightly      Allergies:   Allergies   Allergen Reactions    Lavender Anaphylaxis    Oxycodone-Acetaminophen Nausea/vomiting       Past Medical & Surgical History:  Past Medical History:   Diagnosis Date    Anxiety     Arthritis     Chronic pain disorder     COPD (chronic obstructive pulmonary disease) (Multi)     Headache     Hyperlipidemia     Lower back pain     Lumbar spondylosis     Neck pain     Panic attacks     Sleep apnea       Past Surgical History:   Procedure Laterality Date    APPENDECTOMY  08/08/2014    BACK SURGERY  12/16/23    JOINT REPLACEMENT Left     ELBOW    JOINT REPLACEMENT Right     ANKLE    LUNG SURGERY Right 2014    Partial lung lobectomy    OTHER SURGICAL HISTORY  12/2023    Lumbar spine nerve ablation        Family History   Problem Relation Name Age of Onset    Leukemia Mother Taylor     Cancer Mother Taylor     Dementia Father       Social History     Socioeconomic History    Marital status: Single     Spouse name: Not on file    Number of children: Not on file    Years of education: Not on file    Highest education level: Not on file   Occupational History    Not on file   Tobacco Use    Smoking status: Former     Average packs/day: 0.5 packs/day for 52.1 years (26.0 ttl pk-yrs)     Types: Cigarettes     Start date:      Quit date:      Years since quittin.2    Smokeless tobacco: Never    Tobacco comments:     PT DOWN TO 4 CIGARETTES DAILY OVER LAST MONTH   Vaping Use    Vaping status: Never Used   Substance and Sexual Activity    Alcohol use: Not Currently     Alcohol/week: 2.0 standard drinks of alcohol     Comment: 1 x week - none for a week    Drug use: Not Currently     Types: Marijuana     Comment: OCC/SELDOM GUMMY, LAST USE 2 WEEKS AGO    Sexual activity: Yes     Partners: Female     Birth control/protection: None   Other Topics Concern    Not on file   Social History Narrative    Not on file     Social Drivers of Health     Financial Resource Strain: Low Risk  (2025)    Overall Financial Resource Strain (CARDIA)     Difficulty of Paying Living Expenses: Not hard at all   Food Insecurity: No Food Insecurity (2025)    Hunger Vital Sign     Worried About Running Out of Food in the Last Year: Never true     Ran Out of Food in the Last Year: Never true   Transportation Needs: No Transportation Needs (2025)    PRAPARE - Transportation     Lack of Transportation (Medical): No     Lack of Transportation (Non-Medical): No   Physical Activity: Inactive (2025)    Exercise Vital Sign     Days of Exercise per Week: 0 days     Minutes of Exercise per Session: 0 min   Stress: No Stress Concern Present (2025)    [employer] - Occupational Stress Questionnaire     " Feeling of Stress : Not at all   Social Connections: Unknown (2/7/2025)    Social Connection and Isolation Panel [NHANES]     Frequency of Communication with Friends and Family: More than three times a week     Frequency of Social Gatherings with Friends and Family: More than three times a week     Attends Gnosticist Services: Patient declined     Active Member of Clubs or Organizations: Patient declined     Attends Club or Organization Meetings: Patient declined     Marital Status: Patient declined   Intimate Partner Violence: Not At Risk (2/6/2025)    Humiliation, Afraid, Rape, and Kick questionnaire     Fear of Current or Ex-Partner: No     Emotionally Abused: No     Physically Abused: No     Sexually Abused: No   Housing Stability: Low Risk  (2/6/2025)    Housing Stability Vital Sign     Unable to Pay for Housing in the Last Year: No     Number of Times Moved in the Last Year: 0     Homeless in the Last Year: No       Problems, Past medical history, past surgical history, Medications, allergies, social and family history reviewed and as per the electronic medical record from today's encounter    Review of Systems:  CONST: No fever, chills, fatigue, weight changes  EYES: No loss of vision  ENT: No hearing loss, tinnitus  CV: No chest pain, palpitations  RESP: No dyspnea, shortness of breath, cough  GI: No stool incontinence, nausea, vomiting  : No urinary incontinence  MSK: No joint swelling  SKIN: No rash, no hives  NEURO: No headache, dizziness.   PSYCH: Reports anxiety, depression  HEM/LYMPH: No easy bruising or bleeding  All other systems reviewed are negative     Physical Exam:  Vitals: /66   Pulse 81   Resp 18   Ht 2.007 m (6' 7\")   Wt 99.3 kg (219 lb)   SpO2 97%   BMI 24.67 kg/m²   General: No apparent distress. Alert, appropriate, oriented x 3. Mood generally positive, affect congruent. Speaking in full sentences.   HENT: Normocephalic, atraumatic. Hearing intact.  Eyes: Pupils equal and " round  Neck: Supple, trachea midline  Lungs: Symmetric respiratory excursion on visual exam, nonlabored breathing.   Extremities: No cyanosis or edema noted in extremities.  Skin: No rashes, lesions noted.  Neuro: Alert and appropriate. Gait within normal limits. Bulk and tone within normal limits.    Laboratory Data:  The following laboratory data were reviewed during this visit:   Lab Results   Component Value Date    WBC 11.6 (H) 02/07/2025    RBC 3.71 (L) 02/07/2025    HGB 11.1 (L) 02/07/2025    HCT 35.2 (L) 02/07/2025     02/07/2025      Lab Results   Component Value Date    INR 1.1 01/08/2025     Lab Results   Component Value Date    CREATININE 0.76 02/07/2025    HGBA1C 5.5 01/23/2025       Imaging:  The following imaging impressions were reviewed by me during this visit:    -10/4/24 cervical spine MRI shows C4-C5 mild bilateral facet arthropathy. Trace ligamentum flavum thickening/infolding. Right-greater-than-left uncovertebral spurring with no significant disc bulge. No spinal canal stenosis. Mild-to-moderate left and severe right neural foraminal narrowing. C5-C6 moderate right and mild left facet arthropathy. Slight ligamentum flavum thickening/infolding. Bilateral uncovertebral spurring with tiny central disc protrusion/extrusion with possible slight cranial migration. Borderline/mild spinal canal stenosis with ventral thecal sac indentation. Moderate bilateral neural foraminal narrowing suggested.  -7/20/23 lumbar spine MRI shows L2-L3 left foraminal disc protrusion and left lateral disc protrusion with facet hypertrophy, moderate left-sided neural foraminal narrowing. L4-L5 diffuse disc bulging, facet hypertrophy, moderate to severe canal stenosis with moderate left-sided neural foraminal narrowing. L5-S1 diffuse disc bulging with superimposed left foraminal disc protrusion and facet hypertrophy, severe left-sided neural foraminal narrowing and mild-to-moderate right-sided neural foraminal  narrowing  -8/23/23 left shoulder xray shows no acute fracture or glenohumeral dislocation. No acromioclavicular seperation. Mild acromioclavicular degenerative changes.    I also personally reviewed the images from the above studies myself. These images and my interpretation of them contributed to the management and decision making of the patient's medical plan.    ASSESSMENT:  Mr. Chris Rivas is a 54 y.o. male with low back and leg pain that is consistent with:    1. Cervical radiculopathy          PLAN:  Radiology: -I reviewed his cervical spine MRI which is most significant for foraminal stenosis at C5/6, which could explain some of the pain down his left arm. No additional diagnostics at this time    Physically: Referral to physical therapy provided previously for cervical radiculopathy and likely left shoulder impingement. He still has not started this and remains hesitant to do so due to worsening pain from PT in the past    Psychologically: I suspect there is some component of anxiety causing pain amplification. Continue following with your PCP and would consider referral to a behavioral health provider in the future    Medication: -Continue Gabapentin 600 mg TID    Duration: Multiple years    Intervention: I suspect his low back pain is multifactorial in the setting of lumbar spinal stenosis, lumbar facet arthropathy, lumbar radiculopathy, myofascial pain. He underwent bilateral lumbar medial branch nerve RFA at L4/5, L5/S1 with 80% improvement in his pain  - Regarding his neck, left arm, and left shoulder pain I suspect this is multifactorial secondary to cervical radiculopathy, left shoulder impingement, and possible cervical facet arthropathy.  He underwent left subacromial bursa injection with minimal improvement. After review of his cervical spine MRI I recommend trial of left paramedian interlaminar cervical HAYLEY at C6/7 utilizing live fluoroscopy and local anesthesia. Risks, benefits, alternatives  discussed. He would like to proceed.           Sincerely,  Juan Stark MD  [facility] - [place]

## 2025-03-20 NOTE — PROGRESS NOTES
"[facility]  Follow Up Office Visit Note 3/20/2025    Patient Information: Chris Rivas, MRN: [id number], : 1970   Primary Care/Referring Physician: Beka Gomez, , [address] [facility] [address] / [facility] [address]     Chief Complaint: Left neck and arm pain  Interval History: He returns today for follow up regarding his left neck and shoulder/arm pain    He describes primarily left neck pain which radiates down towards his left shoulder and shoulder blade, and occasionally down into the left arm to just below the arm. He has some intermittent tingling from his left shoulder to his left elbow    Of note, in the interim he underwent L4/5 laminectomy, medial facetectomy, foraminotomies with left L5/S1 hemilaminotomy, foraminotomy in 2024    Brief History of Pain: Mr. Chris Rivas is a 54 y.o. male with a PMHx of HLD, dysphagia, anxiety who presents for evaluation of low back, neck, and shoulder pain. He states his low back is the most signficant area of pain thus will focus on that today    He reports low back pain for 25 years that has been progressively worsening. He states he 'fractured' vertebrae in his low back many years ago but did not require surgery. He describes the pain as a midline stabbing, throbbing, and grinding. His low back pain worsens with walking, twisting. The pain occasionally radiates down his legs, right moreso than the left. His entire right foot is numb, and he occasionally gets numbness further up the leg. His left foot also occasionally gets numb. His Gabapentin dose was increased recently with some improvement in his foot pain but no improvement in his low back pain. He states Diclofenac is working better than Meloxicam but he is still having quite a lot of pain    Regarding his neck and left shoulder, he states he 'broke his neck' in  after diving into a pool and was \"paralyzed from the chest down\" but did not require surgery and recovered. He has a lot of left " shoulder pain, and occasionally right shoulder pain, when trying to lift his arms above his head.    Current Pain Medications: Gabapentin 600 mg TID, Methocarbamol 500 mg TID  Previously Tried Pain Medications: Meloxicam, PO steroids, Duloxetine - caused diarrhea, Diclofenac    Relevant Surgeries: 2/2024: L4/5 laminectomy, medial facetectomy, foraminotomies with left L5/S1 hemilaminotomy, foraminotomy  Injections: L4/5 HAYLEY - no pain relief, Bilateral L4/5, L5/S1 RFA - 80% pain relief. Left subacromial bursa injection - no relief    Physical/Occupational Therapy: No recent PT for his low back    Medications:   Current Outpatient Medications   Medication Instructions    albuterol 90 mcg/actuation inhaler 2 puffs, inhalation, Every 4 hours PRN    chlorphenir/phenyleph/aspirin (VI-SELTZER SEVERE COLD ORAL) Take by mouth.    docusate sodium (COLACE) 100 mg, oral, 2 times daily PRN    gabapentin (NEURONTIN) 600 mg, oral, 3 times daily    HYDROcodone-acetaminophen (Norco) 5-325 mg tablet 1-2 tablets, oral, Every 6 hours PRN    methocarbamol (ROBAXIN) 500 mg, oral, 4 times daily PRN    nicotine polacrilex (NICORETTE) 4 mg, Mouth/Throat, Every 2 hour PRN    PARoxetine (PAXIL) 10 mg, oral, Nightly      Allergies:   Allergies   Allergen Reactions    Lavender Anaphylaxis    Oxycodone-Acetaminophen Nausea/vomiting       Past Medical & Surgical History:  Past Medical History:   Diagnosis Date    Anxiety     Arthritis     Chronic pain disorder     COPD (chronic obstructive pulmonary disease) (Multi)     Headache     Hyperlipidemia     Lower back pain     Lumbar spondylosis     Neck pain     Panic attacks     Sleep apnea       Past Surgical History:   Procedure Laterality Date    APPENDECTOMY  08/08/2014    BACK SURGERY  12/16/23    JOINT REPLACEMENT Left     ELBOW    JOINT REPLACEMENT Right     ANKLE    LUNG SURGERY Right 2014    Partial lung lobectomy    OTHER SURGICAL HISTORY  12/2023    Lumbar spine nerve ablation        Family History   Problem Relation Name Age of Onset    Leukemia Mother Taylor     Cancer Mother Taylor     Dementia Father       Social History     Socioeconomic History    Marital status: Single     Spouse name: Not on file    Number of children: Not on file    Years of education: Not on file    Highest education level: Not on file   Occupational History    Not on file   Tobacco Use    Smoking status: Former     Average packs/day: 0.5 packs/day for 52.1 years (26.0 ttl pk-yrs)     Types: Cigarettes     Start date:      Quit date:      Years since quittin.2    Smokeless tobacco: Never    Tobacco comments:     PT DOWN TO 4 CIGARETTES DAILY OVER LAST MONTH   Vaping Use    Vaping status: Never Used   Substance and Sexual Activity    Alcohol use: Not Currently     Alcohol/week: 2.0 standard drinks of alcohol     Comment: 1 x week - none for a week    Drug use: Not Currently     Types: Marijuana     Comment: OCC/SELDOM GUMMY, LAST USE 2 WEEKS AGO    Sexual activity: Yes     Partners: Female     Birth control/protection: None   Other Topics Concern    Not on file   Social History Narrative    Not on file     Social Drivers of Health     Financial Resource Strain: Low Risk  (2025)    Overall Financial Resource Strain (CARDIA)     Difficulty of Paying Living Expenses: Not hard at all   Food Insecurity: No Food Insecurity (2025)    Hunger Vital Sign     Worried About Running Out of Food in the Last Year: Never true     Ran Out of Food in the Last Year: Never true   Transportation Needs: No Transportation Needs (2025)    PRAPARE - Transportation     Lack of Transportation (Medical): No     Lack of Transportation (Non-Medical): No   Physical Activity: Inactive (2025)    Exercise Vital Sign     Days of Exercise per Week: 0 days     Minutes of Exercise per Session: 0 min   Stress: No Stress Concern Present (2025)    [employer] - Occupational Stress Questionnaire     " Feeling of Stress : Not at all   Social Connections: Unknown (2/7/2025)    Social Connection and Isolation Panel [NHANES]     Frequency of Communication with Friends and Family: More than three times a week     Frequency of Social Gatherings with Friends and Family: More than three times a week     Attends Hinduism Services: Patient declined     Active Member of Clubs or Organizations: Patient declined     Attends Club or Organization Meetings: Patient declined     Marital Status: Patient declined   Intimate Partner Violence: Not At Risk (2/6/2025)    Humiliation, Afraid, Rape, and Kick questionnaire     Fear of Current or Ex-Partner: No     Emotionally Abused: No     Physically Abused: No     Sexually Abused: No   Housing Stability: Low Risk  (2/6/2025)    Housing Stability Vital Sign     Unable to Pay for Housing in the Last Year: No     Number of Times Moved in the Last Year: 0     Homeless in the Last Year: No       Problems, Past medical history, past surgical history, Medications, allergies, social and family history reviewed and as per the electronic medical record from today's encounter    Review of Systems:  CONST: No fever, chills, fatigue, weight changes  EYES: No loss of vision  ENT: No hearing loss, tinnitus  CV: No chest pain, palpitations  RESP: No dyspnea, shortness of breath, cough  GI: No stool incontinence, nausea, vomiting  : No urinary incontinence  MSK: No joint swelling  SKIN: No rash, no hives  NEURO: No headache, dizziness.   PSYCH: Reports anxiety, depression  HEM/LYMPH: No easy bruising or bleeding  All other systems reviewed are negative     Physical Exam:  Vitals: /66   Pulse 81   Resp 18   Ht 2.007 m (6' 7\")   Wt 99.3 kg (219 lb)   SpO2 97%   BMI 24.67 kg/m²   General: No apparent distress. Alert, appropriate, oriented x 3. Mood generally positive, affect congruent. Speaking in full sentences.   HENT: Normocephalic, atraumatic. Hearing intact.  Eyes: Pupils equal and " round  Neck: Supple, trachea midline  Lungs: Symmetric respiratory excursion on visual exam, nonlabored breathing.   Extremities: No cyanosis or edema noted in extremities.  Skin: No rashes, lesions noted.  Neuro: Alert and appropriate. Gait within normal limits. Bulk and tone within normal limits.    Laboratory Data:  The following laboratory data were reviewed during this visit:   Lab Results   Component Value Date    WBC 11.6 (H) 02/07/2025    RBC 3.71 (L) 02/07/2025    HGB 11.1 (L) 02/07/2025    HCT 35.2 (L) 02/07/2025     02/07/2025      Lab Results   Component Value Date    INR 1.1 01/08/2025     Lab Results   Component Value Date    CREATININE 0.76 02/07/2025    HGBA1C 5.5 01/23/2025       Imaging:  The following imaging impressions were reviewed by me during this visit:    -10/4/24 cervical spine MRI shows C4-C5 mild bilateral facet arthropathy. Trace ligamentum flavum thickening/infolding. Right-greater-than-left uncovertebral spurring with no significant disc bulge. No spinal canal stenosis. Mild-to-moderate left and severe right neural foraminal narrowing. C5-C6 moderate right and mild left facet arthropathy. Slight ligamentum flavum thickening/infolding. Bilateral uncovertebral spurring with tiny central disc protrusion/extrusion with possible slight cranial migration. Borderline/mild spinal canal stenosis with ventral thecal sac indentation. Moderate bilateral neural foraminal narrowing suggested.  -7/20/23 lumbar spine MRI shows L2-L3 left foraminal disc protrusion and left lateral disc protrusion with facet hypertrophy, moderate left-sided neural foraminal narrowing. L4-L5 diffuse disc bulging, facet hypertrophy, moderate to severe canal stenosis with moderate left-sided neural foraminal narrowing. L5-S1 diffuse disc bulging with superimposed left foraminal disc protrusion and facet hypertrophy, severe left-sided neural foraminal narrowing and mild-to-moderate right-sided neural foraminal  narrowing  -8/23/23 left shoulder xray shows no acute fracture or glenohumeral dislocation. No acromioclavicular seperation. Mild acromioclavicular degenerative changes.    I also personally reviewed the images from the above studies myself. These images and my interpretation of them contributed to the management and decision making of the patient's medical plan.    ASSESSMENT:  Mr. Chris Rivas is a 54 y.o. male with low back and leg pain that is consistent with:    1. Cervical radiculopathy          PLAN:  Radiology: -I reviewed his cervical spine MRI which is most significant for foraminal stenosis at C5/6, which could explain some of the pain down his left arm. No additional diagnostics at this time    Physically: Referral to physical therapy provided previously for cervical radiculopathy and likely left shoulder impingement. He still has not started this and remains hesitant to do so due to worsening pain from PT in the past    Psychologically: I suspect there is some component of anxiety causing pain amplification. Continue following with your PCP and would consider referral to a behavioral health provider in the future    Medication: -Continue Gabapentin 600 mg TID    Duration: Multiple years    Intervention: I suspect his low back pain is multifactorial in the setting of lumbar spinal stenosis, lumbar facet arthropathy, lumbar radiculopathy, myofascial pain. He underwent bilateral lumbar medial branch nerve RFA at L4/5, L5/S1 with 80% improvement in his pain  - Regarding his neck, left arm, and left shoulder pain I suspect this is multifactorial secondary to cervical radiculopathy, left shoulder impingement, and possible cervical facet arthropathy.  He underwent left subacromial bursa injection with minimal improvement. After review of his cervical spine MRI I recommend trial of left paramedian interlaminar cervical HAYLEY at C6/7 utilizing live fluoroscopy and local anesthesia. Risks, benefits, alternatives  discussed. He would like to proceed.           Sincerely,  Juan Stark MD  [facility] - [place]

## 2025-03-21 DIAGNOSIS — M48.061 DEGENERATIVE LUMBAR SPINAL STENOSIS: ICD-10-CM

## 2025-03-21 RX ORDER — GABAPENTIN 600 MG/1
600 TABLET ORAL 3 TIMES DAILY
Qty: 90 TABLET | Refills: 2 | Status: SHIPPED | OUTPATIENT
Start: 2025-03-21

## 2025-04-04 ENCOUNTER — EVALUATION (OUTPATIENT)
Dept: PHYSICAL THERAPY | Facility: CLINIC | Age: 55
End: 2025-04-04
Payer: COMMERCIAL

## 2025-04-04 DIAGNOSIS — Z98.890 S/P LAMINECTOMY: Primary | ICD-10-CM

## 2025-04-04 DIAGNOSIS — M54.2 CERVICALGIA: ICD-10-CM

## 2025-04-04 PROCEDURE — 97110 THERAPEUTIC EXERCISES: CPT | Mod: GP

## 2025-04-04 PROCEDURE — 97162 PT EVAL MOD COMPLEX 30 MIN: CPT | Mod: GP

## 2025-04-04 NOTE — PROGRESS NOTES
Physical Therapy Evaluation and Treatment     Patient Name: Chris Rivas  MRN: [id number]  Encounter date: 4/4/2025  Time Calculation  Start Time: 0950  Stop Time: 1030  Time Calculation (min): 40 min  PT Evaluation Time Entry  PT Evaluation (Moderate) Time Entry: 20  PT Therapeutic Procedures Time Entry  Therapeutic Exercise Time Entry: 20    Visit # 1 of 16  Visits/Dates Authorized: ANTH [facility] - NO AUTH / 100% COVERAGE / 30V in/out netw - 1 used    Current Problem:   Problem List Items Addressed This Visit             ICD-10-CM    Cervicalgia M54.2    S/P laminectomy - Primary Z98.890    Relevant Orders    Follow Up In Physical Therapy     Precautions:  Precautions  STEADI Fall Risk Score (The score of 4 or more indicates an increased risk of falling): 3  Chronic neck/back pain and radiculopathy, recent lumbar surgery, headaches, DDD       Steadi Fall Risk  One or more falls in the last year? Yes  How many Times? 2 or more  Was the patient injured in the fall? No  Has trouble stepping onto curb? No  Advised to use a cane or walker to get around safely? No  Often has to rush to toilet? No  Feels unsteady when walking? No  Has lost some feeling in feet? Yes  Often feels sad or depressed? No  Steadies self on furniture while walking at home? No  Takes medicine that makes them feel lightheaded or more tired than usual? No  Worried about Falling? No  Takes medicine to sleep or improve mood? No  Needs to push with hands when rising from a chair? Yes      Subjective Evaluation    History of Present Illness  Date of onset: 2/6/2025  Date of surgery: 2/6/2025  Mechanism of injury: 54 y.o. male presenting to the clinic s/p L4-5 laminectomy, left L5-S1 hemilaminectomy/foraminotomy on 2/6/25. Patient had chronic and progressive LBP and radiating LE pain/paresthesias prior to recent surgery. Now having tenderness/stiffness in central low back near incision, but no symptom radiation to LE. Had home health and doing simple  exercises/stretches, as well as walking. Works as a , requiring a lot of repetitive reaching, bending, twisting, and lifting. Plans to go back to work light duty in early May.   Also presenting with neck pain and radiating L shoulder blade and LUE pain and paresthesias, with recent imaging showing multilevel DDD, canal stenosis and foraminal narrowing. Wants to make sure he progresses slowly with back and neck exercises so that he doesn't exacerbate pain and experience set backs before return to work.     Pain  At worst pain ratin  Location: Central low back, neck and L shoulder blade / UE  Quality: dull ache, tight and radiating  Relieving factors: change in position, medications and rest  Aggravating factors: lifting, movement and overhead activity (bending)    Diagnostic Tests  X-ray: abnormal (posterior decompression changes at L4-5 and  L5-S1.  2. Moderate L5-S1 and mild L3-4/L4-5 facet degenerative changes.)    Treatments  Previous treatment: physical therapy  Patient Goals  Patient goals for therapy: decreased pain, increased motion, increased strength and return to work         Objective     Static Posture   General Observations  Flexed.     Head  Forward.    Shoulders  Rounded.    Neurological Testing     Sensation     Hip   Left Hip   Intact: light touch    Right Hip   Intact: light touch    Additional Neurological Details  Reports intermittent paresthesias to LUE    Cervical/Thoracic Screen   Cervical range of motion within normal limits with the following exceptions: Plan to assess at future session    Lumbar Screen  Lumbar range of motion within normal limits with the following exceptions:Flexion: 10% impaired, mild low back and HS tightness  Extension: 25% impaired, LBP  R/L sidebending: WFL    Passive Range of Motion     Additional Passive Range of Motion Details  Mild LBP and tightness with end-range R/L hip flexion    Strength/Myotome Testing     Left Hip   Planes of Motion    Flexion: 4-  Extension: 4-  Abduction: 4-  Adduction: 4+    Right Hip   Planes of Motion   Flexion: 4-  Extension: 4-  Abduction: 4  Adduction: 4+    Left Knee   Normal strength    Right Knee   Normal strength    Additional Strength Details  Core strength deficits noted, L>R hip strength deficits observed with function    Ambulation     Ambulation: Level Surfaces   Ambulation without assistive device: independent    Observational Gait     Additional Observational Gait Details  Slightly flexed trunk with ambulation     Other Outcome Measures:  Other Measures  Neck Disability Index: Future visits  Oswestry Disablity Index (VITALY): Future visits    Treatments:     Therapeutic Exercise 90230:   HEP - see exercises below  (Plan to progress core and LE strength as tolerated, as well as UE strength and mobility for neck pain and return to work at a )    Manual Therapy 53276:   (Future: STM lumbar/cervical if appropriate, gentle cervical traction / stretch)    Modalities:   (TENS (neck/back) / DN of c-spine if indicated)    HEP / Access Codes:   Access Code: [email or web address]  URL: [email or web address]  Date: 04/04/2025  Prepared by: Clyde Crenshaw    Exercises  - Supine Lower Trunk Rotation  - 1 x daily - 20 reps  - Hooklying Single Knee to Chest Stretch  - 1 x daily - 2 sets - 20 seconds hold  - Supine Bridge  - 1 x daily - 1-2 sets - 10 reps  - Hooklying Single Leg Bent Knee Fallouts with Resistance  - 1 x daily - 10-15 reps  - Supine Posterior Pelvic Tilt  - 1 x daily - 10-15 reps - 5 seconds hold  - Supine March  - 1 x daily - 10-20 reps  - Seated Lumbar Flexion Stretch  - 1 x daily - 1-2 sets - 10 reps  - Child's Pose Stretch  - 1 x daily - 1-2 sets - 10 reps  - Standing hip Abduction at Counter - Right and Left (Light hand support on counter)  - 1 x daily - 1-2 sets - 10 reps    Assessment & Plan     Assessment  Impairments: abnormal or restricted ROM, impaired physical strength, lacks appropriate home  exercise program and pain with function  Assessment details: Patient presents with low back pain with general mobility and core/LE strength deficits s/p L4-5 laminectomy, left L5-S1 hemilaminectomy/foraminotomy on 2/6/25. Patient also presenting with neck pain with referred L UE pain/paresthesias, for which additional assessment/treatment will be performed under this therapy episode.  Prognosis: good    Plan  Therapy options: will be seen for skilled physical therapy services  Planned modality interventions: cryotherapy, electrical stimulation/Russian stimulation, TENS, thermotherapy (hydrocollator packs), ultrasound and traction  Planned therapy interventions: therapeutic activities, stretching, strengthening, spinal/joint mobilization, soft tissue mobilization, postural training, neuromuscular re-education, manual therapy, abdominal trunk stabilization, body mechanics training, flexibility, functional ROM exercises, home exercise program, joint mobilization and balance/weight-bearing training  Other planned therapy interventions: Dry needling  Frequency: 1-2x/week.  Duration in visits: 16  Treatment plan discussed with: patient          Moderate complexity due to patient's clinical presentation being evolving with changing characteristics, with comorbidities/complexities to include Chronic neck/back pain and radiculopathy, recent lumbar surgery, headaches, DDD, all of which may negatively impact rehab tolerance and progression.     Post-Treatment Symptoms:  Response to Interventions: No change in pain    Goals:   Active       PT Problem       Lumbar       Start:  04/07/25    Expected End:  06/30/25       1) Pt will report pain levels no greater than 1/10 at worst with activity for less difficulty standing, bending, lifting, and transferring, and working.  2) Pt will display improved pain-free lumbar spine AROM WFL in all planes for less difficulty standing, bending, lifting, transferring and performing normal work  activities.  3) Pt will score <10% impairment on Low Back Disability Questionnaire to indicate minimal low back dysfunction.  4) Pt will improve bilateral hip and knee strength to at least 5/5 for all major muscle groups for improved functional strength with transferring, stairs, and general mobility.              PT Problem       Neck       Start:  04/07/25    Expected End:  06/30/25       1) Pt will report neck pain levels no greater than 2/10 at worst with activity for less difficulty turning head, looking up, driving, and working.  2) Pt will display improved pain-free cervical spine AROM WFL for less difficulty turning head, looking up, driving, and working  3) Pt will score <10% impairment on NDI to indicate minimal neck dysfunction.  4) Pt will deny any radiating pain or N&T into BUE.  5) Pt will improve postural awareness and understand proper ergonomics enabling patient to make timely self-corrections and avoid postural strain that develops into myofascial restrictions.

## 2025-04-07 ENCOUNTER — HOSPITAL ENCOUNTER (OUTPATIENT)
Dept: GASTROENTEROLOGY | Facility: HOSPITAL | Age: 55
Discharge: HOME | End: 2025-04-07
Payer: COMMERCIAL

## 2025-04-07 VITALS
DIASTOLIC BLOOD PRESSURE: 76 MMHG | WEIGHT: 222 LBS | OXYGEN SATURATION: 97 % | TEMPERATURE: 97.9 F | SYSTOLIC BLOOD PRESSURE: 122 MMHG | BODY MASS INDEX: 25.69 KG/M2 | RESPIRATION RATE: 16 BRPM | HEART RATE: 57 BPM | HEIGHT: 78 IN

## 2025-04-07 DIAGNOSIS — M54.12 CERVICAL RADICULOPATHY: ICD-10-CM

## 2025-04-07 PROCEDURE — 2550000001 HC RX 255 CONTRASTS: Performed by: STUDENT IN AN ORGANIZED HEALTH CARE EDUCATION/TRAINING PROGRAM

## 2025-04-07 PROCEDURE — 2500000004 HC RX 250 GENERAL PHARMACY W/ HCPCS (ALT 636 FOR OP/ED): Performed by: STUDENT IN AN ORGANIZED HEALTH CARE EDUCATION/TRAINING PROGRAM

## 2025-04-07 PROCEDURE — 62321 NJX INTERLAMINAR CRV/THRC: CPT | Performed by: STUDENT IN AN ORGANIZED HEALTH CARE EDUCATION/TRAINING PROGRAM

## 2025-04-07 PROCEDURE — 2500000005 HC RX 250 GENERAL PHARMACY W/O HCPCS: Performed by: STUDENT IN AN ORGANIZED HEALTH CARE EDUCATION/TRAINING PROGRAM

## 2025-04-07 RX ORDER — LIDOCAINE HYDROCHLORIDE 10 MG/ML
INJECTION, SOLUTION EPIDURAL; INFILTRATION; INTRACAUDAL; PERINEURAL AS NEEDED
Status: COMPLETED | OUTPATIENT
Start: 2025-04-07 | End: 2025-04-07

## 2025-04-07 RX ORDER — SODIUM CHLORIDE 9 MG/ML
INJECTION, SOLUTION INTRAMUSCULAR; INTRAVENOUS; SUBCUTANEOUS AS NEEDED
Status: COMPLETED | OUTPATIENT
Start: 2025-04-07 | End: 2025-04-07

## 2025-04-07 RX ORDER — TRIAMCINOLONE ACETONIDE 40 MG/ML
INJECTION, SUSPENSION INTRA-ARTICULAR; INTRAMUSCULAR AS NEEDED
Status: COMPLETED | OUTPATIENT
Start: 2025-04-07 | End: 2025-04-07

## 2025-04-07 RX ADMIN — LIDOCAINE HYDROCHLORIDE 5 ML: 10 INJECTION, SOLUTION EPIDURAL; INFILTRATION; INTRACAUDAL; PERINEURAL at 10:22

## 2025-04-07 RX ADMIN — IOHEXOL 1 ML: 240 INJECTION, SOLUTION INTRATHECAL; INTRAVASCULAR; INTRAVENOUS; ORAL at 10:22

## 2025-04-07 RX ADMIN — SODIUM CHLORIDE 2 ML: 9 INJECTION INTRAMUSCULAR; INTRAVENOUS; SUBCUTANEOUS at 10:22

## 2025-04-07 RX ADMIN — TRIAMCINOLONE ACETONIDE 40 MG: 40 INJECTION, SUSPENSION INTRA-ARTICULAR; INTRAMUSCULAR at 10:22

## 2025-04-07 ASSESSMENT — PAIN SCALES - GENERAL
PAINLEVEL_OUTOF10: 5 - MODERATE PAIN
PAINLEVEL_OUTOF10: 0 - NO PAIN
PAINLEVEL_OUTOF10: 5 - MODERATE PAIN

## 2025-04-07 ASSESSMENT — ENCOUNTER SYMPTOMS
QUALITY: TIGHT
QUALITY: DULL ACHE
ALLEVIATING FACTORS: REST
PAIN SCALE AT HIGHEST: 5
EXACERBATED BY: MOVEMENT
QUALITY: RADIATING
ALLEVIATING FACTORS: CHANGE IN POSITION
DEPRESSION: 0
EXACERBATED BY: OVERHEAD ACTIVITY
OCCASIONAL FEELINGS OF UNSTEADINESS: 0
ALLEVIATING FACTORS: MEDICATIONS
LOSS OF SENSATION IN FEET: 1
EXACERBATED BY: LIFTING

## 2025-04-07 ASSESSMENT — COLUMBIA-SUICIDE SEVERITY RATING SCALE - C-SSRS
6. HAVE YOU EVER DONE ANYTHING, STARTED TO DO ANYTHING, OR PREPARED TO DO ANYTHING TO END YOUR LIFE?: NO
1. IN THE PAST MONTH, HAVE YOU WISHED YOU WERE DEAD OR WISHED YOU COULD GO TO SLEEP AND NOT WAKE UP?: NO
2. HAVE YOU ACTUALLY HAD ANY THOUGHTS OF KILLING YOURSELF?: NO

## 2025-04-07 ASSESSMENT — PAIN - FUNCTIONAL ASSESSMENT
PAIN_FUNCTIONAL_ASSESSMENT: 0-10

## 2025-04-07 ASSESSMENT — ACTIVITIES OF DAILY LIVING (ADL): AMBULATION_WITHOUT_ASSISTIVE_DEVICE: INDEPENDENT

## 2025-04-07 ASSESSMENT — PAIN DESCRIPTION - DESCRIPTORS
DESCRIPTORS: SHARP
DESCRIPTORS: OTHER (COMMENT)

## 2025-04-07 NOTE — POST-PROCEDURE NOTE
"PATIENT RECEIVED FROM PROCEDURE ALERT AND ORIENTED. DENIES ANY C/O PAIN. BAND-AID TO POSTERIOR NECK CLEAN , DRY, AND INTACT.   PATIENT ABLE TO STAND AND AMBULATE. DISCHARGE INSTRUCTIONS REVIEWED WITH PATIENT.   1043-PATIENT STATES RIGHT SIDE OF NECK FEELS \"STIFF\". PATIENT GETTING DRESSED FOR DISCHARGE,  "

## 2025-04-07 NOTE — DISCHARGE INSTRUCTIONS
DISCHARGE INSTRUCTIONS FOR INJECTIONS     You underwent an interlaminar cervical epidural steroid injection at C6/7 today    Aftermost injections, it is recommended that you relax and limit your activity for the remainder of the day unless you have been told otherwise by your pain physician.  You should not drive a car, operate machinery, or make important legal decisions unless otherwise directed by your pain physician.  You may resume your normal activity, including exercise, tomorrow.      Keep a written pain diary of how much pain relief you experienced following the injection procedure and the length of time of pain relief you experienced pain relief. Following diagnostic injections like medial branch nerve blocks, sacroiliac joint blocks, stellate ganglion injections and other blocks, it is very important you record the specific amount of pain relief you experienced immediately after the injectionand how long it lasted. Your doctor will ask you for this information at your follow up visit.     For all injections, please keep the injection site dry and inspect the site for a couple of days. You may remove the Band-Aid the day of the injection at any time.     Some discomfort, bruising or slight swelling may occur at the injection site. This is not abnormal if it occurs.  If needed you may:    -Take over the counter medication such as Tylenol or Motrin.   -Apply an ice pack for 30 minutes, 2 to 3 times a day for the first 24 hours.     You may shower today; no soaking baths, hot tubs, whirlpools or swimming pools for two days.      If you are given steroids in your injection, it may take 3-5 days for the steroid medication to take effect. You may notice a worsening of your symptoms for 1-2 days after the injection. This is not abnormal.  You may use acetaminophen, ibuprofen, or prescription medication that your doctor may have prescribed for you if you need to do so.     A few common side effects of steroids  include facial flushing, sweating, restlessness, irritability,difficulty sleeping, increase in blood sugar, and increased blood pressure. If you have diabetes, please monitor your blood sugar at least once a day for at least 5 days. If you have poorly controlled high blood pressure, monitoryour blood pressure for at least 2 days and contact your primary care physician if these numbers are unusually high for you.      If you take aspirin or non-steroidal anti-inflammatory drugs (examples are Motrin, Advil, ibuprofen, Naprosyn, Voltaren, Relafen, etc.) you may restart these this evening, but stop taking it 3 days before your next appointment, unless instructed otherwiseby your physician.      You do not need to discontinue non-aspirin-containing pain medications prior to an injection (examples: Celebrex, tramadol, hydrocodone and acetaminophen).      If you take a blood thinning medication (Coumadin, Lovenox, Fragmin,Ticlid, Plavix, Pradaxa, etc.), please discuss this with your primary care physician/cardiologist and your pain physician. These medications MUST be discontinued before you can have an injection safely, without the risk of uncontrolled bleeding. If these medications are not discontinued for an appropriate period of time, you will not be able to receivean injection.      If you are taking Coumadin, please have your INR checked the morning of your procedure and bringthe result to your appointment unless otherwise instructed. If your INR is over 1.2, your injection may need to be rescheduled to avoid uncontrolled bleeding from the needle placement.     Call [facility] at [phone] between 8am-4pm Monday - Friday if you are experiencing the following:    If you received an epidural or spinal injection:    -Headache that doesnot go away with medicine, is worse when sitting or standing up, and is greatly relieved upon lying down.   -Severe pain worse than or different than your baseline  pain.   -Chills or fever (101º F or greater).   -Drainage or signs of infection at the injection site     Go directly to the Emergency Department if you are experiencing the following and received an epidural or spinal injection:   -Abrupt weakness or progressive weakness in your legs that starts after you leave the clinic.   -Abrupt severe or worsening numbness in your legs.   -Inability to urinate after the injection or loss of bowel or bladder control without the urge to defecate or urinate.     If you have a clinical question that cannot wait until your next appointment, please call [phone] between 8am-4pm Monday - Friday or send a [employer] message. We do our best to return all non-emergency messages within 24 hours, Monday - Friday. A nurse or physician will return your message.      If you need to cancel an appointment, please call the scheduling staff at [phone] during normal business hours or leave a message at least 24 hours in advance.     If you are going to be sedated for your next procedure, you MUST have responsible adult who can legally drive accompany you home. You cannot eat or drink for eight hours prior to the planned procedure if you are going to receive sedation. You may take your non-blood thinning medications with a small sip of water.

## 2025-04-08 ENCOUNTER — DOCUMENTATION (OUTPATIENT)
Dept: PHYSICAL THERAPY | Facility: CLINIC | Age: 55
End: 2025-04-08
Payer: COMMERCIAL

## 2025-04-08 ENCOUNTER — APPOINTMENT (OUTPATIENT)
Dept: PHYSICAL THERAPY | Facility: CLINIC | Age: 55
End: 2025-04-08
Payer: COMMERCIAL

## 2025-04-08 NOTE — PROGRESS NOTES
Physical Therapy                 Therapy Communication Note    Patient Name: Chris Rivas  MRN: [id number]  Encounter Date: 4/8/2025    Discipline: Physical Therapy    Missed Time: Cancel    Comment:  my chart

## 2025-04-14 ENCOUNTER — TREATMENT (OUTPATIENT)
Dept: PHYSICAL THERAPY | Facility: CLINIC | Age: 55
End: 2025-04-14
Payer: COMMERCIAL

## 2025-04-14 DIAGNOSIS — Z98.890 S/P LAMINECTOMY: ICD-10-CM

## 2025-04-14 PROCEDURE — 97110 THERAPEUTIC EXERCISES: CPT | Mod: GP,CQ

## 2025-04-14 ASSESSMENT — PAIN - FUNCTIONAL ASSESSMENT: PAIN_FUNCTIONAL_ASSESSMENT: 0-10

## 2025-04-14 ASSESSMENT — PAIN SCALES - GENERAL: PAINLEVEL_OUTOF10: 2

## 2025-04-14 NOTE — PROGRESS NOTES
"Physical Therapy Treatment    Patient Name: Chris Rivas  MRN: [id number]  Encounter date: 4/14/2025    Time Calculation  Start Time: 1315  Stop Time: 1359  Time Calculation (min): 44 min  PT Therapeutic Procedures Time Entry  Therapeutic Exercise Time Entry: 44    Visit # 2 of 16  Visits/Dates Authorized: 2025: ANTH YHQ - NO AUTH / 100% COVERAGE / 30V in/out netw - 1 used / Availity [id number] / ds 4/3/25 //     Current Problem:   Problem List Items Addressed This Visit             ICD-10-CM    S/P laminectomy Z98.890     Surgery: s/p L4-5 laminectomy, left L5-S1 hemilaminectomy/foraminotomy on 2/6/25   Surgery date: 2/6/2025     Precautions:    DHIRAJ Fall Risk Score (The score of 4 or more indicates an increased risk of falling): 3  Chronic neck/back pain and radiculopathy, recent lumbar surgery, headaches, DDD       Subjective   General:    Pt reports good tolerance to last session to last session and compliance with HEP.  Pt reports still experiencing some residual weakness of L LE.   Pt reports he is glad at this point that he chose to have lumbar surgery.  Pt reports much less pain overall.   Pt reports he did receive an epidural injection into \"my neck\" one week ago today.  Pt reports the injection did seem to help the neck.    Pt reports he did experience some significant challenge performing standing band resisted hip abduction on L LE (green band) so changed to performing without the band.      Pre-Treatment Symptoms:   Pain Assessment: 0-10  0-10 (Numeric) Pain Score: 2  Pain Location: Back    Objective   Pt arrives ambulating with no assistive device, subtle trunk flexion bias, subtle decrease trunk rotation.   Vitals:             Treatments:      Therapeutic Exercise [id number]:   Supine LTR 2 x 10  Supine PPT x 20 reps at 3-5\" holds.   Supine TrA bracing band resisted clam shell, orange. 2 x 10 reps   Supine band resisted hip abduction with supine bridge 2 x 10 reps  Standing TB hip abduction R and L , " "pink band, 2 x 10  Standing TBSS, orange, x 1 lap at counter top.   Standing hamstring stretch, at step, R and L  2 x 30\" each.     Neuromuscular Re-Ed 31065:  DNP    HEP / Access Codes:   Access Code: [email or web address]  URL: [email or web address]  Date: 04/14/2025  Prepared by: Rajesh Miles    Exercises  - Hooklying Clamshell with Resistance  - 1-2 x daily - 6 x weekly - 2 sets - 10 reps  - Standing Hamstring Stretch on Chair  - 1-2 x daily - 6 x weekly - 2 reps - 30 hold    Access Code: [email or web address]  URL: [email or web address]  Date: 04/04/2025  Prepared by: Clyde Crenshaw     Exercises  - Supine Lower Trunk Rotation  - 1 x daily - 20 reps  - Hooklying Single Knee to Chest Stretch  - 1 x daily - 2 sets - 20 seconds hold  - Supine Bridge  - 1 x daily - 1-2 sets - 10 reps  - Hooklying Single Leg Bent Knee Fallouts with Resistance  - 1 x daily - 10-15 reps  - Supine Posterior Pelvic Tilt  - 1 x daily - 10-15 reps - 5 seconds hold  - Supine March  - 1 x daily - 10-20 reps  - Seated Lumbar Flexion Stretch  - 1 x daily - 1-2 sets - 10 reps  - Child's Pose Stretch  - 1 x daily - 1-2 sets - 10 reps  - Standing hip Abduction at Counter - Right and Left (Light hand support on counter)  - 1 x daily - 1-2 sets - 10 reps  Assessment:    Patient tolerated treatment well. Patient appears to be working hard in clinic and motivated to decrease pain and restore all functional deficits. Verbal and/or tactile cues given for proper form, and avoidance of substitution patterns with all exercises. All infection control policies followed during this visit. No observed antalgia with exercise performance.  Pt demonstrated excellent re-call of log roll technique when transferring sit <=> supine.     Post-Treatment Symptoms:   Response to Interventions: No change in pain    Plan:      Therapy options: will be seen for skilled physical therapy services  Planned modality interventions: cryotherapy, electrical stimulation/Malagasy " stimulation, TENS, thermotherapy (hydrocollator packs), ultrasound and traction  Planned therapy interventions: therapeutic activities, stretching, strengthening, spinal/joint mobilization, soft tissue mobilization, postural training, neuromuscular re-education, manual therapy, abdominal trunk stabilization, body mechanics training, flexibility, functional ROM exercises, home exercise program, joint mobilization and balance/weight-bearing training  Other planned therapy interventions: Dry needling  Frequency: 1-2x/week.  Duration in visits: 16  Treatment plan discussed with: patient            Moderate complexity due to patient's clinical presentation being evolving with changing characteristics, with comorbidities/complexities to include Chronic neck/back pain and radiculopathy, recent lumbar surgery, headaches, DDD, all of which may negatively impact rehab tolerance and progression.   Goals:   Active       PT Problem       Lumbar       Start:  04/07/25    Expected End:  06/30/25       1) Pt will report pain levels no greater than 1/10 at worst with activity for less difficulty standing, bending, lifting, and transferring, and working.  2) Pt will display improved pain-free lumbar spine AROM WFL in all planes for less difficulty standing, bending, lifting, transferring and performing normal work activities.  3) Pt will score <10% impairment on Low Back Disability Questionnaire to indicate minimal low back dysfunction.  4) Pt will improve bilateral hip and knee strength to at least 5/5 for all major muscle groups for improved functional strength with transferring, stairs, and general mobility.              PT Problem       Neck       Start:  04/07/25    Expected End:  06/30/25       1) Pt will report neck pain levels no greater than 2/10 at worst with activity for less difficulty turning head, looking up, driving, and working.  2) Pt will display improved pain-free cervical spine AROM WFL for less difficulty turning  head, looking up, driving, and working  3) Pt will score <10% impairment on NDI to indicate minimal neck dysfunction.  4) Pt will deny any radiating pain or N&T into BUE.  5) Pt will improve postural awareness and understand proper ergonomics enabling patient to make timely self-corrections and avoid postural strain that develops into myofascial restrictions.

## 2025-04-16 ENCOUNTER — TREATMENT (OUTPATIENT)
Dept: PHYSICAL THERAPY | Facility: CLINIC | Age: 55
End: 2025-04-16
Payer: COMMERCIAL

## 2025-04-16 DIAGNOSIS — Z98.890 S/P LAMINECTOMY: ICD-10-CM

## 2025-04-16 PROCEDURE — 97110 THERAPEUTIC EXERCISES: CPT | Mod: GP,CQ

## 2025-04-16 PROCEDURE — 97140 MANUAL THERAPY 1/> REGIONS: CPT | Mod: GP,CQ

## 2025-04-16 ASSESSMENT — PAIN SCALES - GENERAL: PAINLEVEL_OUTOF10: 4

## 2025-04-16 ASSESSMENT — PAIN - FUNCTIONAL ASSESSMENT: PAIN_FUNCTIONAL_ASSESSMENT: 0-10

## 2025-04-16 NOTE — PROGRESS NOTES
"Physical Therapy Treatment    Patient Name: Chris Rivas  MRN: [id number]  Encounter date: 4/16/2025    Time Calculation  Start Time: 1115  Stop Time: 1158  Time Calculation (min): 43 min  PT Therapeutic Procedures Time Entry  Manual Therapy Time Entry: 15  Therapeutic Exercise Time Entry: 28    Visit # 3 of 16  Visits/Dates Authorized: 2025: ANTH YHQ - NO AUTH / 100% COVERAGE / 30V in/out netw - 1 used / [employer] [id number] / ds 4/3/25 //     Current Problem:   Problem List Items Addressed This Visit           ICD-10-CM    S/P laminectomy Z98.890       Surgery: s/p L4-5 laminectomy, left L5-S1 hemilaminectomy/foraminotomy on 2/6/25   Surgery date: 2/6/2025     Precautions:    DHIRAJ Fall Risk Score (The score of 4 or more indicates an increased risk of falling): 3  Chronic neck/back pain and radiculopathy, recent lumbar surgery, headaches, DDD       Subjective   General:    Patient states he woke up this morning with some pain in neck, must have slept on it wrong. Patient reports the low back is feeling good, no pain today. HEP going well so far.     Pre-Treatment Symptoms:   Pain Assessment: 0-10  0-10 (Numeric) Pain Score: 4 (neck)    Objective   Pt arrives ambulating with no assistive device, subtle trunk flexion bias, subtle decrease trunk rotation.              Treatments:      Therapeutic Exercise [id number]:   Nustep L3 6 min , seat 15 , arms 13  HSC 50# 2x10  Quad extension 10# (no pin) 2x10  Shuttle leg press DL 75# 1x10 , SL 37.5# 1x10 R/L   Supine horiz abd orange 1x10  Supine flexion raise overhead 1x10  HEP updated and reviewed    DNP:  Supine LTR 2 x 10  Supine PPT x 20 reps at 3-5\" holds.   Supine TrA bracing band resisted clam shell, orange. 2 x 10 reps   Supine band resisted hip abduction with supine bridge 2 x 10 reps  Standing TB hip abduction R and L , pink band, 2 x 10  Standing TBSS, orange, x 1 lap at counter top.   Standing hamstring stretch, at step, R and L  2 x 30\" each.       Manual Therapy " 88711:   SOR  STM- CPS, levators, UT  Gentle cervical traction    HEP / Access Codes:   Access Code: [email or web address]  Date: 04/14/2025  - Hooklying Clamshell with Resistance  - 1-2 x daily - 6 x weekly - 2 sets - 10 reps  - Standing Hamstring Stretch on Chair  - 1-2 x daily - 6 x weekly - 2 reps - 30 hold    Access Code: [email or web address]  Date: 04/04/2025  - Supine Lower Trunk Rotation  - 1 x daily - 20 reps  - Hooklying Single Knee to Chest Stretch  - 1 x daily - 2 sets - 20 seconds hold  - Supine Bridge  - 1 x daily - 1-2 sets - 10 reps  - Hooklying Single Leg Bent Knee Fallouts with Resistance  - 1 x daily - 10-15 reps  - Supine Posterior Pelvic Tilt  - 1 x daily - 10-15 reps - 5 seconds hold  - Supine March  - 1 x daily - 10-20 reps  - Seated Lumbar Flexion Stretch  - 1 x daily - 1-2 sets - 10 reps  - Child's Pose Stretch  - 1 x daily - 1-2 sets - 10 reps  - Standing hip Abduction at Counter - Right and Left (Light hand support on counter)  - 1 x daily - 1-2 sets - 10 reps    Access Code: [email or web address]  Date: 04/16/2025  - Supine Shoulder Horizontal Abduction with Resistance  - 1 x daily - 1-2 sets - 10 reps  - Supine Shoulder Flexion Extension Full Range AROM  - 1 x daily - 2 sets - 10 reps    Assessment:    Patient trialed some new exercises today, focused on LE machines, and some UE exercises in supine. Patient had no complaints of pain throughout session, just fatigue in L LE. Patient had favorable response to manual therapies, reducing pain in cervical.     Post-Treatment Symptoms:   Response to Interventions: Relief, Decrease in pain    Plan:      Therapy options: will be seen for skilled physical therapy services  Planned modality interventions: cryotherapy, electrical stimulation/Russian stimulation, TENS, thermotherapy (hydrocollator packs), ultrasound and traction  Planned therapy interventions: therapeutic activities, stretching, strengthening, spinal/joint mobilization, soft tissue mobilization, postural training,  neuromuscular re-education, manual therapy, abdominal trunk stabilization, body mechanics training, flexibility, functional ROM exercises, home exercise program, joint mobilization and balance/weight-bearing training  Other planned therapy interventions: Dry needling  Frequency: 1-2x/week.  Duration in visits: 16      Goals:   Active       PT Problem       Lumbar       Start:  04/07/25    Expected End:  06/30/25       1) Pt will report pain levels no greater than 1/10 at worst with activity for less difficulty standing, bending, lifting, and transferring, and working.  2) Pt will display improved pain-free lumbar spine AROM WFL in all planes for less difficulty standing, bending, lifting, transferring and performing normal work activities.  3) Pt will score <10% impairment on Low Back Disability Questionnaire to indicate minimal low back dysfunction.  4) Pt will improve bilateral hip and knee strength to at least 5/5 for all major muscle groups for improved functional strength with transferring, stairs, and general mobility.              PT Problem       Neck       Start:  04/07/25    Expected End:  06/30/25       1) Pt will report neck pain levels no greater than 2/10 at worst with activity for less difficulty turning head, looking up, driving, and working.  2) Pt will display improved pain-free cervical spine AROM WFL for less difficulty turning head, looking up, driving, and working  3) Pt will score <10% impairment on NDI to indicate minimal neck dysfunction.  4) Pt will deny any radiating pain or N&T into BUE.  5) Pt will improve postural awareness and understand proper ergonomics enabling patient to make timely self-corrections and avoid postural strain that develops into myofascial restrictions.

## 2025-04-21 ENCOUNTER — TREATMENT (OUTPATIENT)
Dept: PHYSICAL THERAPY | Facility: CLINIC | Age: 55
End: 2025-04-21
Payer: COMMERCIAL

## 2025-04-21 DIAGNOSIS — Z98.890 S/P LAMINECTOMY: ICD-10-CM

## 2025-04-21 PROCEDURE — 97110 THERAPEUTIC EXERCISES: CPT | Mod: GP,CQ

## 2025-04-21 PROCEDURE — 97140 MANUAL THERAPY 1/> REGIONS: CPT | Mod: GP,CQ

## 2025-04-21 ASSESSMENT — PAIN SCALES - GENERAL: PAINLEVEL_OUTOF10: 1

## 2025-04-21 ASSESSMENT — PAIN - FUNCTIONAL ASSESSMENT: PAIN_FUNCTIONAL_ASSESSMENT: 0-10

## 2025-04-21 NOTE — PROGRESS NOTES
"Physical Therapy Treatment    Patient Name: Chris Rivas  MRN: [id number]  Encounter date: 4/21/2025    Time Calculation  Start Time: 1230  Stop Time: 1323  Time Calculation (min): 53 min  PT Therapeutic Procedures Time Entry  Manual Therapy Time Entry: 8  Therapeutic Exercise Time Entry: 45    Visit # 4 of 16  Visits/Dates Authorized: 2025: ANTH YHQ - NO AUTH / 100% COVERAGE / 30V in/out netw - 1 used / [employer] [id number] / ds 4/3/25 //     Current Problem:   Problem List Items Addressed This Visit           ICD-10-CM    S/P laminectomy Z98.890         Surgery: s/p L4-5 laminectomy, left L5-S1 hemilaminectomy/foraminotomy on 2/6/25   Surgery date: 2/6/2025     Precautions:    DHIRAJ Fall Risk Score (The score of 4 or more indicates an increased risk of falling): 3  Chronic neck/back pain and radiculopathy, recent lumbar surgery, headaches, DDD       Subjective   General:    Patient reports good tolerance to last session and good compliance with HEP.  Pt reports current neck and LBP rated at 1 of 10.  Pt reports he did use a chain saw last Friday, cutting for \"about 10 minutes\".  Pt reports he was very aware of his body mechanics and did not experience any isssues during, though did wake with very sore     Pre-Treatment Symptoms:   Pain Assessment: 0-10  0-10 (Numeric) Pain Score: 1    Objective   10 weeks and 4 days post-op this date.    Pt arrives ambulating with no assistive device, subtle trunk flexion bias, subtle decrease trunk rotation             Treatments:      Therapeutic Exercise [id number]:   Nustep L3 6 min , seat 15 , arms 13  HSC DL 50# 2x15  Shuttle leg press DL 75# 1x10 , SL 37.5# 1x10 R/L   Shuttle press glute max extension;  12.5#  x 15 each  Quad extension 10# (no pin) 3 x 10  Supine horiz abd green 2 x 15   Supine flexion raise overhead 1x10      DNP:  Supine LTR 2 x 10  Supine PPT x 20 reps at 3-5\" holds.   Supine TrA bracing band resisted clam shell, orange. 2 x 10 reps   Supine band resisted " "hip abduction with supine bridge 2 x 10 reps  Standing TB hip abduction R and L , pink band, 2 x 10  Standing TBSS, orange, x 1 lap at counter top.   Standing hamstring stretch, at step, R and L  2 x 30\" each.       Manual Therapy 21444:   SOR  STM- CPS, levators, UT  Gentle manual cervical traction    HEP / Access Codes:   Access Code: [email or web address]  Date: 04/14/2025  - Hooklying Clamshell with Resistance  - 1-2 x daily - 6 x weekly - 2 sets - 10 reps  - Standing Hamstring Stretch on Chair  - 1-2 x daily - 6 x weekly - 2 reps - 30 hold    Access Code: [email or web address]  Date: 04/04/2025  - Supine Lower Trunk Rotation  - 1 x daily - 20 reps  - Hooklying Single Knee to Chest Stretch  - 1 x daily - 2 sets - 20 seconds hold  - Supine Bridge  - 1 x daily - 1-2 sets - 10 reps  - Hooklying Single Leg Bent Knee Fallouts with Resistance  - 1 x daily - 10-15 reps  - Supine Posterior Pelvic Tilt  - 1 x daily - 10-15 reps - 5 seconds hold  - Supine March  - 1 x daily - 10-20 reps  - Seated Lumbar Flexion Stretch  - 1 x daily - 1-2 sets - 10 reps  - Child's Pose Stretch  - 1 x daily - 1-2 sets - 10 reps  - Standing hip Abduction at Counter - Right and Left (Light hand support on counter)  - 1 x daily - 1-2 sets - 10 reps    Access Code: [email or web address]  Date: 04/16/2025  - Supine Shoulder Horizontal Abduction with Resistance  - 1 x daily - 1-2 sets - 10 reps  - Supine Shoulder Flexion Extension Full Range AROM  - 1 x daily - 2 sets - 10 reps    Assessment:    Patient tolerated treatment well. Patient appears to be working hard in clinic and motivated to decrease pain and restore all functional deficits. Verbal and/or tactile cues given for proper form, and avoidance of substitution patterns with all exercises. All infection control policies followed during this visit. No observed antalgia with exercise performance .  Pt was provided with intermittent verbal cueing for appropriate core muscle recruitment during exercise performance and transfers " "on/off plinth.    Post-Treatment Symptoms:    \"Sore\" right side LB;  \"I'm able to raise my shoulder higher\".     Plan:      Therapy options: will be seen for skilled physical therapy services  Planned modality interventions: cryotherapy, electrical stimulation/Russian stimulation, TENS, thermotherapy (hydrocollator packs), ultrasound and traction  Planned therapy interventions: therapeutic activities, stretching, strengthening, spinal/joint mobilization, soft tissue mobilization, postural training, neuromuscular re-education, manual therapy, abdominal trunk stabilization, body mechanics training, flexibility, functional ROM exercises, home exercise program, joint mobilization and balance/weight-bearing training  Other planned therapy interventions: Dry needling  Frequency: 1-2x/week.  Duration in visits: 16      Goals:   Active       PT Problem       Lumbar       Start:  04/07/25    Expected End:  06/30/25       1) Pt will report pain levels no greater than 1/10 at worst with activity for less difficulty standing, bending, lifting, and transferring, and working.  2) Pt will display improved pain-free lumbar spine AROM WFL in all planes for less difficulty standing, bending, lifting, transferring and performing normal work activities.  3) Pt will score <10% impairment on Low Back Disability Questionnaire to indicate minimal low back dysfunction.  4) Pt will improve bilateral hip and knee strength to at least 5/5 for all major muscle groups for improved functional strength with transferring, stairs, and general mobility.              PT Problem       Neck       Start:  04/07/25    Expected End:  06/30/25       1) Pt will report neck pain levels no greater than 2/10 at worst with activity for less difficulty turning head, looking up, driving, and working.  2) Pt will display improved pain-free cervical spine AROM WFL for less difficulty turning head, looking up, driving, and working  3) Pt will score <10% impairment on " NDI to indicate minimal neck dysfunction.  4) Pt will deny any radiating pain or N&T into BUE.  5) Pt will improve postural awareness and understand proper ergonomics enabling patient to make timely self-corrections and avoid postural strain that develops into myofascial restrictions.

## 2025-04-23 ENCOUNTER — TREATMENT (OUTPATIENT)
Dept: PHYSICAL THERAPY | Facility: CLINIC | Age: 55
End: 2025-04-23
Payer: COMMERCIAL

## 2025-04-23 DIAGNOSIS — Z98.890 S/P LAMINECTOMY: ICD-10-CM

## 2025-04-23 PROCEDURE — 97110 THERAPEUTIC EXERCISES: CPT | Mod: GP,CQ

## 2025-04-23 PROCEDURE — 97014 ELECTRIC STIMULATION THERAPY: CPT | Mod: GP,CQ

## 2025-04-23 ASSESSMENT — PAIN SCALES - GENERAL: PAINLEVEL_OUTOF10: 4

## 2025-04-23 ASSESSMENT — PAIN - FUNCTIONAL ASSESSMENT: PAIN_FUNCTIONAL_ASSESSMENT: 0-10

## 2025-04-23 NOTE — PROGRESS NOTES
"Physical Therapy Treatment    Patient Name: Chris Rivas  MRN: [id number]  Encounter date: 4/23/2025    Time Calculation  Start Time: 1201  Stop Time: 1245  Time Calculation (min): 44 min  PT Modalities Time Entry  E-Stim (Unattended) Time Entry: 12  PT Therapeutic Procedures Time Entry  Therapeutic Exercise Time Entry: 30    Visit # 5 of 16  Visits/Dates Authorized: 2025: ANTH YHQ - NO AUTH / 100% COVERAGE / 30V in/out netw - 1 used / Availity [id number] / ds 4/3/25 //     Current Problem:   Problem List Items Addressed This Visit           ICD-10-CM    S/P laminectomy Z98.890           Surgery: s/p L4-5 laminectomy, left L5-S1 hemilaminectomy/foraminotomy on 2/6/25   Surgery date: 2/6/2025     Precautions:    DHIRAJ Fall Risk Score (The score of 4 or more indicates an increased risk of falling): 3  Chronic neck/back pain and radiculopathy, recent lumbar surgery, headaches, DDD       Subjective   General:    Patient reports he just finished helping a friend cut some branches, was holding ladder. Patient states he is feeling more R sided neck pain today, LBP is minimal.     Pre-Treatment Symptoms:   Pain Assessment: 0-10  0-10 (Numeric) Pain Score: 4 (R neck/shoulder)    Objective   10 weeks and 4 days post-op this date.    Pt arrives ambulating with no assistive device, subtle trunk flexion bias, subtle decrease trunk rotation             Treatments:      Therapeutic Exercise 98642:   Nustep L5 6 min , seat 15 , arms 13  Tband row with scap pinch blue 2x15  Tband extension blue 2x10  Standing open book 1x10 R/L   Supine cervical retraction isometric 2x10  3 sec hold  Supine chin tuck  2x10    DNP:  HSC DL 50# 2x15  Shuttle leg press DL 75# 1x10 , SL 37.5# 1x10 R/L   Shuttle press glute max extension;  12.5#  x 15 each  Quad extension 10# (no pin) 3 x 10  Supine horiz abd green 2 x 15   Supine flexion raise overhead 1x10  Supine LTR 2 x 10  Supine PPT x 20 reps at 3-5\" holds.   Supine TrA bracing band resisted clam " "shell, orange. 2 x 10 reps   Supine band resisted hip abduction with supine bridge 2 x 10 reps  Standing TB hip abduction R and L , pink band, 2 x 10  Standing TBSS, orange, x 1 lap at counter top.   Standing hamstring stretch, at step, R and L  2 x 30\" each.       Manual Therapy 95620: DNP  SOR  STM- CPS, levators, UT  Gentle manual cervical traction    Modalities:   Unattended e-stim: Brazilian: applied to R/L UT , Intensity to tolerance, 5 seconds on, 5 seconds off, applied for 12 minutes, in Supine , with wedge     HEP / Access Codes: URL: [email or web address]  Access Code: [email or web address]  Date: 04/14/2025  - Hooklying Clamshell with Resistance  - 1-2 x daily - 6 x weekly - 2 sets - 10 reps  - Standing Hamstring Stretch on Chair  - 1-2 x daily - 6 x weekly - 2 reps - 30 hold    Access Code: [email or web address]  Date: 04/04/2025  - Supine Lower Trunk Rotation  - 1 x daily - 20 reps  - Hooklying Single Knee to Chest Stretch  - 1 x daily - 2 sets - 20 seconds hold  - Supine Bridge  - 1 x daily - 1-2 sets - 10 reps  - Hooklying Single Leg Bent Knee Fallouts with Resistance  - 1 x daily - 10-15 reps  - Supine Posterior Pelvic Tilt  - 1 x daily - 10-15 reps - 5 seconds hold  - Supine March  - 1 x daily - 10-20 reps  - Seated Lumbar Flexion Stretch  - 1 x daily - 1-2 sets - 10 reps  - Child's Pose Stretch  - 1 x daily - 1-2 sets - 10 reps  - Standing hip Abduction at Counter - Right and Left (Light hand support on counter)  - 1 x daily - 1-2 sets - 10 reps    Access Code: [email or web address]  Date: 04/16/2025  - Supine Shoulder Horizontal Abduction with Resistance  - 1 x daily - 1-2 sets - 10 reps  - Supine Shoulder Flexion Extension Full Range AROM  - 1 x daily - 2 sets - 10 reps    Access Code: [email or web address]  Date: 04/23/2025  - Supine Cervical Retraction with Towel  - 1-2 x daily - 2 sets - 10 reps - 3 sec hold  - Supine Chin Tuck  - 1-2 x daily - 2 sets - 10 reps  - Standing Thoracic Open Book at Wall  - 1-2 x daily - 1-2 sets - 10 reps  - " Standing Shoulder Row with Anchored Resistance  - 1 x daily - 2 sets - 15 reps  - Shoulder extension with resistance - Neutral  - 1 x daily - 2 sets - 10 reps    Assessment:    Treatment focused on UE strengthening and cervical/thoracic mobility. Patient had no complaints of any significant pain increase in neck during session. Patient trialed Argentine estim at end of session today, to reduce tone in bilateral UT's.     Post-Treatment Symptoms:   Response to Interventions: Decrease in pain, Relief    Plan:      Therapy options: will be seen for skilled physical therapy services  Planned modality interventions: cryotherapy, electrical stimulation/Russian stimulation, TENS, thermotherapy (hydrocollator packs), ultrasound and traction  Planned therapy interventions: therapeutic activities, stretching, strengthening, spinal/joint mobilization, soft tissue mobilization, postural training, neuromuscular re-education, manual therapy, abdominal trunk stabilization, body mechanics training, flexibility, functional ROM exercises, home exercise program, joint mobilization and balance/weight-bearing training  Other planned therapy interventions: Dry needling  Frequency: 1-2x/week.  Duration in visits: 16      Goals:   Active       PT Problem       Lumbar       Start:  04/07/25    Expected End:  06/30/25       1) Pt will report pain levels no greater than 1/10 at worst with activity for less difficulty standing, bending, lifting, and transferring, and working.  2) Pt will display improved pain-free lumbar spine AROM WFL in all planes for less difficulty standing, bending, lifting, transferring and performing normal work activities.  3) Pt will score <10% impairment on Low Back Disability Questionnaire to indicate minimal low back dysfunction.  4) Pt will improve bilateral hip and knee strength to at least 5/5 for all major muscle groups for improved functional strength with transferring, stairs, and general mobility.               PT Problem       Neck       Start:  04/07/25    Expected End:  06/30/25       1) Pt will report neck pain levels no greater than 2/10 at worst with activity for less difficulty turning head, looking up, driving, and working.  2) Pt will display improved pain-free cervical spine AROM WFL for less difficulty turning head, looking up, driving, and working  3) Pt will score <10% impairment on NDI to indicate minimal neck dysfunction.  4) Pt will deny any radiating pain or N&T into BUE.  5) Pt will improve postural awareness and understand proper ergonomics enabling patient to make timely self-corrections and avoid postural strain that develops into myofascial restrictions.

## 2025-04-28 ENCOUNTER — TREATMENT (OUTPATIENT)
Dept: PHYSICAL THERAPY | Facility: CLINIC | Age: 55
End: 2025-04-28
Payer: COMMERCIAL

## 2025-04-28 DIAGNOSIS — Z98.890 S/P LAMINECTOMY: ICD-10-CM

## 2025-04-28 PROCEDURE — 97110 THERAPEUTIC EXERCISES: CPT | Mod: GP

## 2025-04-28 PROCEDURE — 97014 ELECTRIC STIMULATION THERAPY: CPT | Mod: GP

## 2025-04-28 ASSESSMENT — PAIN SCALES - GENERAL: PAINLEVEL_OUTOF10: 1

## 2025-04-28 ASSESSMENT — PAIN - FUNCTIONAL ASSESSMENT: PAIN_FUNCTIONAL_ASSESSMENT: 0-10

## 2025-04-28 NOTE — PROGRESS NOTES
Physical Therapy Treatment    Patient Name: Chris Rivas  MRN: [id number]  Encounter date: 4/28/2025    Time Calculation  Start Time: 0938  Stop Time: 1018  Time Calculation (min): 40 min  PT Modalities Time Entry  E-Stim (Unattended) Time Entry: 12  PT Therapeutic Procedures Time Entry  Therapeutic Exercise Time Entry: 26  Non-Billable Time  Non-billable time: 2    Visit # 6 of 16  Visits/Dates Authorized: 2025: ANTH YHQ - NO AUTH / 100% COVERAGE / 30V in/out netw - 1 used / Availity [id number] / ds 4/3/25 //     Current Problem:   Problem List Items Addressed This Visit           ICD-10-CM    S/P laminectomy Z98.890       Surgery: s/p L4-5 laminectomy, left L5-S1 hemilaminectomy/foraminotomy on 2/6/25   Surgery date: 2/6/2025     Precautions:    STEADI Fall Risk Score (The score of 4 or more indicates an increased risk of falling): 3  Chronic neck/back pain and radiculopathy, recent lumbar surgery, headaches, DDD       Subjective   General:    Patient reports back feels pretty good lately, no issues. Some pain in hip this morning but thinks he slept on it wrong Compliant with HEP. States neck/shoulders felt great following previous session in which spinal mobility and postural strengthening was performed.    Pre-Treatment Symptoms:   Pain Assessment: 0-10  0-10 (Numeric) Pain Score: 1    Objective   Pt arrives ambulating with no assistive device, subtle trunk flexion bias, subtle decrease trunk rotation             Treatments:      Therapeutic Exercise 07828:   UBE L1.5 4' fw/bw  Tband row with scap pinch purple 2x15  Tband extension green x15  Green Ws x15  Standing green horiz abd x15  Standing open book 1x12 R/L   Supine cervical retraction isometric x10  3 sec hold  Supine chin nod x10  3 sec hold    DNP:  Nustep L5 6 min , seat 15 , arms 13  HSC DL 50# 2x15  Shuttle leg press DL 75# 1x10 , SL 37.5# 1x10 R/L   Shuttle press glute max extension;  12.5#  x 15 each  Quad extension 10# (no pin) 3 x 10  Supine  "horiz abd green 2 x 15   Supine flexion raise overhead 1x10  Supine LTR 2 x 10  Supine PPT x 20 reps at 3-5\" holds.   Supine TrA bracing band resisted clam shell, orange. 2 x 10 reps   Supine band resisted hip abduction with supine bridge 2 x 10 reps  Standing TB hip abduction R and L , pink band, 2 x 10  Standing TBSS, orange, x 1 lap at counter top.   Standing hamstring stretch, at step, R and L  2 x 30\" each.       Manual Therapy 04336: DNP  SOR  STM- CPS, levators, UT  Gentle manual cervical traction    Modalities:   Unattended e-stim: Hungarian: applied to R/L UT , Intensity to tolerance, 6 seconds on, 6 seconds off, applied for 12 minutes, in Supine , with wedge     HEP / Access Codes: URL: [email or web address]  Access Code: [email or web address]  Date: 04/14/2025  - Hooklying Clamshell with Resistance  - 1-2 x daily - 6 x weekly - 2 sets - 10 reps  - Standing Hamstring Stretch on Chair  - 1-2 x daily - 6 x weekly - 2 reps - 30 hold    Access Code: [email or web address]  Date: 04/04/2025  - Supine Lower Trunk Rotation  - 1 x daily - 20 reps  - Hooklying Single Knee to Chest Stretch  - 1 x daily - 2 sets - 20 seconds hold  - Supine Bridge  - 1 x daily - 1-2 sets - 10 reps  - Hooklying Single Leg Bent Knee Fallouts with Resistance  - 1 x daily - 10-15 reps  - Supine Posterior Pelvic Tilt  - 1 x daily - 10-15 reps - 5 seconds hold  - Supine March  - 1 x daily - 10-20 reps  - Seated Lumbar Flexion Stretch  - 1 x daily - 1-2 sets - 10 reps  - Child's Pose Stretch  - 1 x daily - 1-2 sets - 10 reps  - Standing hip Abduction at Counter - Right and Left (Light hand support on counter)  - 1 x daily - 1-2 sets - 10 reps    Access Code: [email or web address]  Date: 04/16/2025  - Supine Shoulder Horizontal Abduction with Resistance  - 1 x daily - 1-2 sets - 10 reps  - Supine Shoulder Flexion Extension Full Range AROM  - 1 x daily - 2 sets - 10 reps    Access Code: [email or web address]  Date: 04/23/2025  - Supine Cervical Retraction with Towel  - 1-2 x daily - 2 sets " - 10 reps - 3 sec hold  - Supine Chin Tuck  - 1-2 x daily - 2 sets - 10 reps  - Standing Thoracic Open Book at Wall  - 1-2 x daily - 1-2 sets - 10 reps  - Standing Shoulder Row with Anchored Resistance  - 1 x daily - 2 sets - 15 reps  - Shoulder extension with resistance - Neutral  - 1 x daily - 2 sets - 10 reps    Assessment:    Continued postural strengthening today, tolerated well, but occasional L>R shoulder pain noted with Ws/horiz and standing open book. Concluded with Russian stim to bilateral CPS and UT again due to positive symptom response to previous sessions.     Post-Treatment Symptoms:   Response to Interventions: No change in pain    Plan:      Therapy options: will be seen for skilled physical therapy services  Planned modality interventions: cryotherapy, electrical stimulation/Russian stimulation, TENS, thermotherapy (hydrocollator packs), ultrasound and traction  Planned therapy interventions: therapeutic activities, stretching, strengthening, spinal/joint mobilization, soft tissue mobilization, postural training, neuromuscular re-education, manual therapy, abdominal trunk stabilization, body mechanics training, flexibility, functional ROM exercises, home exercise program, joint mobilization and balance/weight-bearing training  Other planned therapy interventions: Dry needling  Frequency: 1-2x/week.  Duration in visits: 16      Goals:   Active       PT Problem       Lumbar       Start:  04/07/25    Expected End:  06/30/25       1) Pt will report pain levels no greater than 1/10 at worst with activity for less difficulty standing, bending, lifting, and transferring, and working.  2) Pt will display improved pain-free lumbar spine AROM WFL in all planes for less difficulty standing, bending, lifting, transferring and performing normal work activities.  3) Pt will score <10% impairment on Low Back Disability Questionnaire to indicate minimal low back dysfunction.  4) Pt will improve bilateral hip and  knee strength to at least 5/5 for all major muscle groups for improved functional strength with transferring, stairs, and general mobility.              PT Problem       Neck       Start:  04/07/25    Expected End:  06/30/25       1) Pt will report neck pain levels no greater than 2/10 at worst with activity for less difficulty turning head, looking up, driving, and working.  2) Pt will display improved pain-free cervical spine AROM WFL for less difficulty turning head, looking up, driving, and working  3) Pt will score <10% impairment on NDI to indicate minimal neck dysfunction.  4) Pt will deny any radiating pain or N&T into BUE.  5) Pt will improve postural awareness and understand proper ergonomics enabling patient to make timely self-corrections and avoid postural strain that develops into myofascial restrictions.

## 2025-04-30 ENCOUNTER — TREATMENT (OUTPATIENT)
Dept: PHYSICAL THERAPY | Facility: CLINIC | Age: 55
End: 2025-04-30
Payer: COMMERCIAL

## 2025-04-30 DIAGNOSIS — Z98.890 S/P LAMINECTOMY: ICD-10-CM

## 2025-04-30 PROCEDURE — 97014 ELECTRIC STIMULATION THERAPY: CPT | Mod: GP

## 2025-04-30 PROCEDURE — 97140 MANUAL THERAPY 1/> REGIONS: CPT | Mod: GP

## 2025-04-30 PROCEDURE — 97110 THERAPEUTIC EXERCISES: CPT | Mod: GP

## 2025-04-30 ASSESSMENT — PAIN SCALES - GENERAL: PAINLEVEL_OUTOF10: 4

## 2025-04-30 ASSESSMENT — PAIN - FUNCTIONAL ASSESSMENT: PAIN_FUNCTIONAL_ASSESSMENT: 0-10

## 2025-04-30 NOTE — PROGRESS NOTES
Physical Therapy Treatment    Patient Name: Chris Rivas  MRN: [id number]  Encounter date: 4/30/2025    Time Calculation  Start Time: 1112  Stop Time: 1157  Time Calculation (min): 45 min  PT Modalities Time Entry  E-Stim (Unattended) Time Entry: 15  PT Therapeutic Procedures Time Entry  Manual Therapy Time Entry: 10  Therapeutic Exercise Time Entry: 15  Non-Billable Time  Non-billable time: 5    Visit # 7 of 16  Visits/Dates Authorized: 2025: ANTH YHQ - NO AUTH / 100% COVERAGE / 30V in/out netw - 1 used / Availity [id number] / ds 4/3/25 //     Current Problem:   Problem List Items Addressed This Visit           ICD-10-CM    S/P laminectomy Z98.890     Surgery: s/p L4-5 laminectomy, left L5-S1 hemilaminectomy/foraminotomy on 2/6/25   Surgery date: 2/6/2025     Precautions:    STEADI Fall Risk Score (The score of 4 or more indicates an increased risk of falling): 3  Chronic neck/back pain and radiculopathy, recent lumbar surgery, headaches, DDD       Subjective   General:    Patient reports low back feels great, but L shoulder is pretty sore lately, rated 4/10 today. Hurts to the touch in anterior shoulder / prox biceps region.     Pre-Treatment Symptoms:   Pain Assessment: 0-10  0-10 (Numeric) Pain Score: 44/10    Objective   Tender to palpation and soft tissue restrictions noted in L proximal biceps tendon and anterior GH             Treatments:      Therapeutic Exercise 33172:   UBE L2 4' fw/bw  Supine dowel flexion 90 deg to OH - pain-free range x15  Supine orange horiz abd x12  Supine orange TB loop around wrists, OH flexion AROM x12    DNP / Previous  Tband row with scap pinch purple 2x15  Tband extension green x15  Green Ws x15  Standing green horiz abd x15  Standing open book 1x12 R/L   Supine cervical retraction isometric x10  3 sec hold  Supine chin nod x10  3 sec hold    HSC DL 50# 2x15  Shuttle leg press DL 75# 1x10 , SL 37.5# 1x10 R/L   Shuttle press glute max extension;  12.5#  x 15 each  Supine LTR 2  "x 10  Supine PPT x 20 reps at 3-5\" holds.   Supine TrA bracing band resisted clam shell, orange. 2 x 10 reps   Supine band resisted hip abduction with supine bridge 2 x 10 reps  Standing TB hip abduction R and L , pink band, 2 x 10  Standing TBSS, orange, x 1 lap at counter top.       Manual Therapy 80000:   Manual pec stretch in supine  IASTM L shoulder / biceps    DNP  SOR  STM- CPS, levators, UT  Gentle manual cervical traction    Modalities:   Unattended e-stim: TENS: applied to R/L CPS, L anterior shoulder, Intensity to tolerance, for 15 minutes, in Supine , with wedge, with moist heat at cervical    HEP / Access Codes: URL: [email or web address]  Access Code: [email or web address]  Date: 04/14/2025  - Hooklying Clamshell with Resistance  - 1-2 x daily - 6 x weekly - 2 sets - 10 reps  - Standing Hamstring Stretch on Chair  - 1-2 x daily - 6 x weekly - 2 reps - 30 hold    Access Code: [email or web address]  Date: 04/04/2025  - Supine Lower Trunk Rotation  - 1 x daily - 20 reps  - Hooklying Single Knee to Chest Stretch  - 1 x daily - 2 sets - 20 seconds hold  - Supine Bridge  - 1 x daily - 1-2 sets - 10 reps  - Hooklying Single Leg Bent Knee Fallouts with Resistance  - 1 x daily - 10-15 reps  - Supine Posterior Pelvic Tilt  - 1 x daily - 10-15 reps - 5 seconds hold  - Supine March  - 1 x daily - 10-20 reps  - Seated Lumbar Flexion Stretch  - 1 x daily - 1-2 sets - 10 reps  - Child's Pose Stretch  - 1 x daily - 1-2 sets - 10 reps  - Standing hip Abduction at Counter - Right and Left (Light hand support on counter)  - 1 x daily - 1-2 sets - 10 reps    Access Code: [email or web address]  Date: 04/16/2025  - Supine Shoulder Horizontal Abduction with Resistance  - 1 x daily - 1-2 sets - 10 reps  - Supine Shoulder Flexion Extension Full Range AROM  - 1 x daily - 2 sets - 10 reps    Access Code: [email or web address]  Date: 04/23/2025  - Supine Cervical Retraction with Towel  - 1-2 x daily - 2 sets - 10 reps - 3 sec hold  - Supine Chin Tuck  - 1-2 x daily - 2 sets " - 10 reps  - Standing Thoracic Open Book at Wall  - 1-2 x daily - 1-2 sets - 10 reps  - Standing Shoulder Row with Anchored Resistance  - 1 x daily - 2 sets - 15 reps  - Shoulder extension with resistance - Neutral  - 1 x daily - 2 sets - 10 reps    Assessment:    Held on strenuous postural/shoulder strengthening today due to increased L shoulder pain since previous visit. Tender prox biceps tendon and anterior GH with soft tissue restrictions present, treated with IASTM and subsequent TENS for pain modulation and reduction of soft tissue tension.     Post-Treatment Symptoms:   Response to Interventions: Decrease in pain    Plan:      Therapy options: will be seen for skilled physical therapy services  Planned modality interventions: cryotherapy, electrical stimulation/Russian stimulation, TENS, thermotherapy (hydrocollator packs), ultrasound and traction  Planned therapy interventions: therapeutic activities, stretching, strengthening, spinal/joint mobilization, soft tissue mobilization, postural training, neuromuscular re-education, manual therapy, abdominal trunk stabilization, body mechanics training, flexibility, functional ROM exercises, home exercise program, joint mobilization and balance/weight-bearing training  Other planned therapy interventions: Dry needling  Frequency: 1-2x/week.  Duration in visits: 16      Goals:   Active       PT Problem       Lumbar       Start:  04/07/25    Expected End:  06/30/25       1) Pt will report pain levels no greater than 1/10 at worst with activity for less difficulty standing, bending, lifting, and transferring, and working.  2) Pt will display improved pain-free lumbar spine AROM WFL in all planes for less difficulty standing, bending, lifting, transferring and performing normal work activities.  3) Pt will score <10% impairment on Low Back Disability Questionnaire to indicate minimal low back dysfunction.  4) Pt will improve bilateral hip and knee strength to at least  5/5 for all major muscle groups for improved functional strength with transferring, stairs, and general mobility.              PT Problem       Neck       Start:  04/07/25    Expected End:  06/30/25       1) Pt will report neck pain levels no greater than 2/10 at worst with activity for less difficulty turning head, looking up, driving, and working.  2) Pt will display improved pain-free cervical spine AROM WFL for less difficulty turning head, looking up, driving, and working  3) Pt will score <10% impairment on NDI to indicate minimal neck dysfunction.  4) Pt will deny any radiating pain or N&T into BUE.  5) Pt will improve postural awareness and understand proper ergonomics enabling patient to make timely self-corrections and avoid postural strain that develops into myofascial restrictions.

## 2025-05-05 ENCOUNTER — OFFICE VISIT (OUTPATIENT)
Dept: PRIMARY CARE | Facility: CLINIC | Age: 55
End: 2025-05-05
Payer: COMMERCIAL

## 2025-05-05 ENCOUNTER — APPOINTMENT (OUTPATIENT)
Dept: PHYSICAL THERAPY | Facility: CLINIC | Age: 55
End: 2025-05-05
Payer: COMMERCIAL

## 2025-05-05 ENCOUNTER — DOCUMENTATION (OUTPATIENT)
Dept: PHYSICAL THERAPY | Facility: CLINIC | Age: 55
End: 2025-05-05
Payer: COMMERCIAL

## 2025-05-05 VITALS
DIASTOLIC BLOOD PRESSURE: 68 MMHG | BODY MASS INDEX: 25.01 KG/M2 | HEART RATE: 87 BPM | OXYGEN SATURATION: 98 % | TEMPERATURE: 97.3 F | WEIGHT: 222 LBS | SYSTOLIC BLOOD PRESSURE: 110 MMHG

## 2025-05-05 DIAGNOSIS — F41.0 PANIC ATTACK: ICD-10-CM

## 2025-05-05 DIAGNOSIS — F17.210 CIGARETTE SMOKER: ICD-10-CM

## 2025-05-05 DIAGNOSIS — Z13.6 ENCOUNTER FOR SCREENING FOR VASCULAR DISEASE: ICD-10-CM

## 2025-05-05 DIAGNOSIS — Z12.5 SCREENING FOR PROSTATE CANCER: ICD-10-CM

## 2025-05-05 DIAGNOSIS — R73.09 ELEVATED GLUCOSE: ICD-10-CM

## 2025-05-05 DIAGNOSIS — R06.2 WHEEZING: ICD-10-CM

## 2025-05-05 DIAGNOSIS — M48.061 DEGENERATIVE LUMBAR SPINAL STENOSIS: ICD-10-CM

## 2025-05-05 DIAGNOSIS — M48.062 LUMBAR STENOSIS WITH NEUROGENIC CLAUDICATION: Primary | ICD-10-CM

## 2025-05-05 PROCEDURE — 99214 OFFICE O/P EST MOD 30 MIN: CPT | Performed by: FAMILY MEDICINE

## 2025-05-05 RX ORDER — PAROXETINE 10 MG/1
10 TABLET, FILM COATED ORAL NIGHTLY
Qty: 90 TABLET | Refills: 0 | Status: SHIPPED | OUTPATIENT
Start: 2025-05-05

## 2025-05-05 RX ORDER — METHOCARBAMOL 500 MG/1
500 TABLET, FILM COATED ORAL 4 TIMES DAILY PRN
Qty: 30 TABLET | Refills: 0 | Status: SHIPPED | OUTPATIENT
Start: 2025-05-05

## 2025-05-05 RX ORDER — ALBUTEROL SULFATE 90 UG/1
2 INHALANT RESPIRATORY (INHALATION) EVERY 4 HOURS PRN
Qty: 18 G | Refills: 2 | Status: SHIPPED | OUTPATIENT
Start: 2025-05-05

## 2025-05-05 RX ORDER — IBUPROFEN 200 MG
1 TABLET ORAL EVERY 24 HOURS
Qty: 30 PATCH | Refills: 0 | Status: SHIPPED | OUTPATIENT
Start: 2025-05-05 | End: 2025-06-04

## 2025-05-05 ASSESSMENT — LIFESTYLE VARIABLES
HOW OFTEN DO YOU HAVE A DRINK CONTAINING ALCOHOL: 2-4 TIMES A MONTH
HOW MANY STANDARD DRINKS CONTAINING ALCOHOL DO YOU HAVE ON A TYPICAL DAY: 1 OR 2
HOW OFTEN DO YOU HAVE SIX OR MORE DRINKS ON ONE OCCASION: NEVER
AUDIT-C TOTAL SCORE: 2
SKIP TO QUESTIONS 9-10: 1

## 2025-05-05 ASSESSMENT — PAIN SCALES - GENERAL: PAINLEVEL_OUTOF10: 5

## 2025-05-05 NOTE — PROGRESS NOTES
Physical Therapy                 Therapy Communication Note    Patient Name: Chris Rivas  MRN: [id number]  Encounter Date: 5/5/2025    Discipline: Physical Therapy    Missed Time: Cancel    Comment:

## 2025-05-05 NOTE — PROGRESS NOTES
Subjective   Patient ID: Chris Rivas is a 55 y.o. male who presents for Follow-up.    Here for follow up.     Osteoarthritis/Pain Management  -History of: Lumbar facet arthropathy, Lumbar DDD  -F/U: Seeing pain management.  Had epidural injections - did well.  Did RFA - doing very well.  Pt had surgery - pt is still in therapy.  Legs are doing well.  Some numbness in feet.  Surgery went.  Pt has been off work.  Going back next week.    -Treatment: RFA, epidural injections, gabapentin, diclofenac.   -Past Evaluation: xrays, MRI  -Specialist: Dr. Stark, Dr. Wilkins  -Past Medications:     Anxiety  -F/U:  Mood has been stable.  Cut way back on alcohol.  On paroxetine - doing well.    -Symptoms have been stable    -She denies current suicidal and homicidal ideation.    -Current Treatment: Paxil, Lorazepam.    -Counseling: none currently  -Previous treatment includes:     Left Shoulder Pain  -Pain in axilla, posterior pain.  Pain over trap - does radiate to hand.  Pushing, raising arm increase pain.  Still having issues. Had injections - helped neck but no shoulder improvement.    -Using diclofenac and gabapentin - not helping.    -Specialist: Dr. Stark            Review of Systems    Objective   /68   Pulse 87   Temp 36.3 °C (97.3 °F) (Temporal)   Wt 101 kg (222 lb)   SpO2 98%   BMI 25.01 kg/m²     Physical Exam  Vitals reviewed.   Constitutional:       General: He is not in acute distress.  Cardiovascular:      Rate and Rhythm: Normal rate and regular rhythm.   Pulmonary:      Effort: Pulmonary effort is normal.      Breath sounds: No wheezing or rhonchi.   Musculoskeletal:      Right lower leg: No edema.      Left lower leg: No edema.   Lymphadenopathy:      Cervical: No cervical adenopathy.   Neurological:      Mental Status: He is alert.         Assessment/Plan   Diagnoses and all orders for this visit:  Lumbar stenosis with neurogenic claudication  -     Comprehensive Metabolic Panel;  Future  Cigarette smoker  -     nicotine (Nicoderm CQ) 14 mg/24 hr patch; Place 1 patch over 24 hours on the skin once every 24 hours.  Encounter for screening for vascular disease  -     Lipid Panel; Future  Screening for prostate cancer  -     Prostate Specific Antigen; Future  Panic attack  -     PARoxetine (Paxil) 10 mg tablet; Take 1 tablet (10 mg) by mouth once daily at bedtime.  -     CBC and Auto Differential; Future  -     Comprehensive Metabolic Panel; Future  -     TSH with reflex to Free T4 if abnormal; Future  Degenerative lumbar spinal stenosis  -     methocarbamol (Robaxin) 500 mg tablet; Take 1 tablet (500 mg) by mouth 4 times a day as needed for muscle spasms.  Wheezing  -     albuterol 90 mcg/actuation inhaler; Inhale 2 puffs every 4 hours if needed for wheezing.  -     CBC and Auto Differential; Future  Elevated glucose  -     Comprehensive Metabolic Panel; Future  -     Hemoglobin A1C; Future    Patient Instructions   Here for follow up.     For low back issues - lumbar stenosis - healing well.  Doing well.  Plan is to return to work    For mood - anxiety stable.  Continue paroxetine.      For smoking - discussed options.  Recommend nicotine patch.  Can try nicotine microlozenges or toothpicks also.     Order for blood work - get done prior to visit.

## 2025-05-05 NOTE — PATIENT INSTRUCTIONS
Here for follow up.     For low back issues - lumbar stenosis - healing well.  Doing well.  Plan is to return to work    For mood - anxiety stable.  Continue paroxetine.      For smoking - discussed options.  Recommend nicotine patch.  Can try nicotine microlozenges or toothpicks also.     Order for blood work - get done prior to visit.

## 2025-05-06 NOTE — PROGRESS NOTES
"[facility]  Follow Up Office Visit Note 2025    Patient Information: Chris Rivas, MRN: [id number], : 1970   Primary Care/Referring Physician: Beka Gomez, , [address] / [facility] [address]     Chief Complaint: Left neck and arm pain  Interval History: He returns today for follow up after C6/7 HAYLEY    Today he reports around 50% pain relief from this injection in his neck, but minimal improvement in the pain overlying his left shoulder. He has been doing PT targeting his neck and left shoulder but isn't sure how much this is helping. His back pain remains well controlled after RF ablation    Brief History of Pain: Mr. Chris Rivas is a 55 y.o. male with a PMHx of HLD, dysphagia, anxiety who presents for evaluation of low back, neck, and shoulder pain. He states his low back is the most signficant area of pain thus will focus on that today    He reports low back pain for 25 years that has been progressively worsening. He states he 'fractured' vertebrae in his low back many years ago but did not require surgery. He describes the pain as a midline stabbing, throbbing, and grinding. His low back pain worsens with walking, twisting. The pain occasionally radiates down his legs, right moreso than the left. His entire right foot is numb, and he occasionally gets numbness further up the leg. His left foot also occasionally gets numb. His Gabapentin dose was increased recently with some improvement in his foot pain but no improvement in his low back pain. He states Diclofenac is working better than Meloxicam but he is still having quite a lot of pain    Regarding his neck and left shoulder, he states he 'broke his neck' in  after diving into a pool and was \"paralyzed from the chest down\" but did not require surgery and recovered. He has a lot of left shoulder pain, and occasionally right shoulder pain, when trying to lift his arms above his head.    Current Pain Medications: " Gabapentin 600 mg TID, Methocarbamol 500 mg TID  Previously Tried Pain Medications: Meloxicam, PO steroids, Duloxetine - caused diarrhea, Diclofenac    Relevant Surgeries: 2/2024: L4/5 laminectomy, medial facetectomy, foraminotomies with left L5/S1 hemilaminotomy, foraminotomy  Injections: L4/5 HAYLEY - no pain relief, Bilateral L4/5, L5/S1 RFA - 80% pain relief. C6/7 HAYLEY - 50% relief. Left subacromial bursa injection - no relief    Physical/Occupational Therapy: No recent PT for his low back. Currently doing PT for his neck and left shoulder    Medications:   Current Outpatient Medications   Medication Instructions    albuterol 90 mcg/actuation inhaler 2 puffs, inhalation, Every 4 hours PRN    chlorphenir/phenyleph/aspirin (VI-SELTZER SEVERE COLD ORAL) Take by mouth.    gabapentin (NEURONTIN) 600 mg, oral, 3 times daily    methocarbamol (ROBAXIN) 500 mg, oral, 4 times daily PRN    nicotine (Nicoderm CQ) 14 mg/24 hr patch 1 patch, transdermal, Every 24 hours    PARoxetine (PAXIL) 10 mg, oral, Nightly      Allergies:   Allergies   Allergen Reactions    Lavender Anaphylaxis    Oxycodone-Acetaminophen Nausea/vomiting       Past Medical & Surgical History:  Past Medical History:   Diagnosis Date    Anxiety     Arthritis     Chronic pain disorder     COPD (chronic obstructive pulmonary disease) (Multi)     Headache     Hyperlipidemia     Lower back pain     Lumbar spondylosis     Neck pain     Panic attacks     Sleep apnea       Past Surgical History:   Procedure Laterality Date    APPENDECTOMY  08/08/2014    BACK SURGERY  12/16/23    JOINT REPLACEMENT Left     ELBOW    JOINT REPLACEMENT Right     ANKLE    LUNG SURGERY Right 2014    Partial lung lobectomy    OTHER SURGICAL HISTORY  12/2023    Lumbar spine nerve ablation       Family History   Problem Relation Name Age of Onset    Leukemia Mother Taylor     Cancer Mother Taylor     Dementia Father       Social History     Socioeconomic History    Marital status: Single      Spouse name: Not on file    Number of children: Not on file    Years of education: Not on file    Highest education level: Not on file   Occupational History    Not on file   Tobacco Use    Smoking status: Every Day     Average packs/day: 0.5 packs/day for 52.1 years (26.0 ttl pk-yrs)     Types: Cigarettes     Start date:      Last attempt to quit:      Years since quittin.3    Smokeless tobacco: Never    Tobacco comments:     PT DOWN TO 4 CIGARETTES DAILY OVER LAST MONTH   Vaping Use    Vaping status: Never Used   Substance and Sexual Activity    Alcohol use: Yes     Alcohol/week: 2.0 standard drinks of alcohol     Types: 2 Standard drinks or equivalent per week     Comment: 1 x week - none for a week    Drug use: Not Currently     Types: Marijuana     Comment: 3 x over last 6 months last used 3 weeks ago    Sexual activity: Yes     Partners: Female     Birth control/protection: None   Other Topics Concern    Not on file   Social History Narrative    Not on file     Social Drivers of Health     Financial Resource Strain: Low Risk  (2025)    Overall Financial Resource Strain (CARDIA)     Difficulty of Paying Living Expenses: Not hard at all   Food Insecurity: No Food Insecurity (2025)    Hunger Vital Sign     Worried About Running Out of Food in the Last Year: Never true     Ran Out of Food in the Last Year: Never true   Transportation Needs: No Transportation Needs (2025)    PRAPARE - Transportation     Lack of Transportation (Medical): No     Lack of Transportation (Non-Medical): No   Physical Activity: Inactive (2025)    Exercise Vital Sign     Days of Exercise per Week: 0 days     Minutes of Exercise per Session: 0 min   Stress: No Stress Concern Present (2025)    [employer] - Occupational Stress Questionnaire     Feeling of Stress : Not at all   Social Connections: Unknown (2025)    Social Connection and Isolation Panel [NHANES]     Frequency of  "Communication with Friends and Family: More than three times a week     Frequency of Social Gatherings with Friends and Family: More than three times a week     Attends Latter day Services: Patient declined     Active Member of Clubs or Organizations: Patient declined     Attends Club or Organization Meetings: Patient declined     Marital Status: Patient declined   Intimate Partner Violence: Not At Risk (2/6/2025)    Humiliation, Afraid, Rape, and Kick questionnaire     Fear of Current or Ex-Partner: No     Emotionally Abused: No     Physically Abused: No     Sexually Abused: No   Housing Stability: Low Risk  (2/6/2025)    Housing Stability Vital Sign     Unable to Pay for Housing in the Last Year: No     Number of Times Moved in the Last Year: 0     Homeless in the Last Year: No       Problems, Past medical history, past surgical history, Medications, allergies, social and family history reviewed and as per the electronic medical record from today's encounter    Review of Systems:  CONST: No fever, chills, fatigue, weight changes  EYES: No loss of vision  ENT: No hearing loss, tinnitus  CV: No chest pain, palpitations  RESP: No dyspnea, shortness of breath, cough  GI: No stool incontinence, nausea, vomiting  : No urinary incontinence  MSK: No joint swelling  SKIN: No rash, no hives  NEURO: No headache, dizziness.   PSYCH: Reports anxiety, depression  HEM/LYMPH: No easy bruising or bleeding  All other systems reviewed are negative     Physical Exam:  Vitals: /74   Pulse 70   Resp 18   Ht 2.007 m (6' 7\")   Wt 101 kg (222 lb)   SpO2 98%   BMI 25.01 kg/m²   General: No apparent distress. Alert, appropriate, oriented x 3. Mood generally positive, affect congruent. Speaking in full sentences.   HENT: Normocephalic, atraumatic. Hearing intact.  Eyes: Pupils equal and round  Neck: Supple, trachea midline  Lungs: Symmetric respiratory excursion on visual exam, nonlabored breathing.   Extremities: No cyanosis or " edema noted in extremities.  Skin: No rashes, lesions noted.  Neuro: Alert and appropriate. Gait within normal limits. Bulk and tone within normal limits.    Laboratory Data:  The following laboratory data were reviewed during this visit:   Lab Results   Component Value Date    WBC 11.6 (H) 02/07/2025    RBC 3.71 (L) 02/07/2025    HGB 11.1 (L) 02/07/2025    HCT 35.2 (L) 02/07/2025     02/07/2025      Lab Results   Component Value Date    INR 1.1 01/08/2025     Lab Results   Component Value Date    CREATININE 0.76 02/07/2025    HGBA1C 5.5 01/23/2025       Imaging:  The following imaging impressions were reviewed by me during this visit:    -10/4/24 cervical spine MRI shows C4-C5 mild bilateral facet arthropathy. Trace ligamentum flavum thickening/infolding. Right-greater-than-left uncovertebral spurring with no significant disc bulge. No spinal canal stenosis. Mild-to-moderate left and severe right neural foraminal narrowing. C5-C6 moderate right and mild left facet arthropathy. Slight ligamentum flavum thickening/infolding. Bilateral uncovertebral spurring with tiny central disc protrusion/extrusion with possible slight cranial migration. Borderline/mild spinal canal stenosis with ventral thecal sac indentation. Moderate bilateral neural foraminal narrowing suggested.  -7/20/23 lumbar spine MRI shows L2-L3 left foraminal disc protrusion and left lateral disc protrusion with facet hypertrophy, moderate left-sided neural foraminal narrowing. L4-L5 diffuse disc bulging, facet hypertrophy, moderate to severe canal stenosis with moderate left-sided neural foraminal narrowing. L5-S1 diffuse disc bulging with superimposed left foraminal disc protrusion and facet hypertrophy, severe left-sided neural foraminal narrowing and mild-to-moderate right-sided neural foraminal narrowing  -8/23/23 left shoulder xray shows no acute fracture or glenohumeral dislocation. No acromioclavicular seperation. Mild acromioclavicular  degenerative changes.    I also personally reviewed the images from the above studies myself. These images and my interpretation of them contributed to the management and decision making of the patient's medical plan.    ASSESSMENT:  Mr. Chris Rivas is a 55 y.o. male with low back and leg pain that is consistent with:    1. Cervical radiculopathy    2. Chronic left shoulder pain        PLAN:  Radiology: -I reviewed his cervical spine MRI which is most significant for foraminal stenosis at C5/6, which could explain some of the pain down his left arm. No additional diagnostics at this time    Physically: -Continue current PT  - Prescription for a TENS unit provided today. This has provided relief of his left neck/shoulder pain when used during PT    Psychologically: I suspect there is some component of anxiety causing pain amplification. Continue following with your PCP and would consider referral to a behavioral health provider in the future    Medication: -Continue Gabapentin 600 mg TID    Duration: Multiple years    Intervention: I suspect his low back pain is multifactorial in the setting of lumbar spinal stenosis, lumbar facet arthropathy, lumbar radiculopathy, myofascial pain. He underwent bilateral lumbar medial branch nerve RFA at L4/5, L5/S1 with 80% improvement in his pain  - Regarding his neck, left arm, and left shoulder pain I suspect this is multifactorial secondary to cervical radiculopathy, left shoulder impingement, and possible cervical facet arthropathy.  He underwent left subacromial bursa injection with minimal improvement. He underwent left paramedian interlaminar cervical HAYLEY at C6/7 with 50% relief. Will monitor for duration of pain relief.   - Would consider a left suprascapular nerve Sprint PNS for his left shoulder pain. He remains hesitant to have this done          Sincerely,  Juan Stark MD  [facility] [place]

## 2025-05-07 ENCOUNTER — HOSPITAL ENCOUNTER (OUTPATIENT)
Dept: RADIOLOGY | Facility: CLINIC | Age: 55
Discharge: HOME | End: 2025-05-07
Payer: COMMERCIAL

## 2025-05-07 ENCOUNTER — OFFICE VISIT (OUTPATIENT)
Dept: ORTHOPEDIC SURGERY | Facility: CLINIC | Age: 55
End: 2025-05-07
Payer: COMMERCIAL

## 2025-05-07 VITALS — HEIGHT: 78 IN | WEIGHT: 222 LBS | BODY MASS INDEX: 25.69 KG/M2

## 2025-05-07 DIAGNOSIS — Z98.890 S/P LAMINECTOMY: ICD-10-CM

## 2025-05-07 DIAGNOSIS — M54.12 CERVICAL RADICULOPATHY: ICD-10-CM

## 2025-05-07 DIAGNOSIS — Z98.890 S/P LAMINECTOMY: Primary | ICD-10-CM

## 2025-05-07 DIAGNOSIS — M48.062 LUMBAR STENOSIS WITH NEUROGENIC CLAUDICATION: ICD-10-CM

## 2025-05-07 DIAGNOSIS — M48.061 DEGENERATIVE LUMBAR SPINAL STENOSIS: ICD-10-CM

## 2025-05-07 DIAGNOSIS — M47.816 LUMBAR SPONDYLOSIS: ICD-10-CM

## 2025-05-07 PROCEDURE — 3008F BODY MASS INDEX DOCD: CPT | Performed by: STUDENT IN AN ORGANIZED HEALTH CARE EDUCATION/TRAINING PROGRAM

## 2025-05-07 PROCEDURE — 99211 OFF/OP EST MAY X REQ PHY/QHP: CPT | Performed by: STUDENT IN AN ORGANIZED HEALTH CARE EDUCATION/TRAINING PROGRAM

## 2025-05-07 PROCEDURE — 72100 X-RAY EXAM L-S SPINE 2/3 VWS: CPT | Performed by: RADIOLOGY

## 2025-05-07 PROCEDURE — 72100 X-RAY EXAM L-S SPINE 2/3 VWS: CPT

## 2025-05-07 ASSESSMENT — ENCOUNTER SYMPTOMS
DEPRESSION: 0
LOSS OF SENSATION IN FEET: 0
OCCASIONAL FEELINGS OF UNSTEADINESS: 1

## 2025-05-07 ASSESSMENT — PAIN - FUNCTIONAL ASSESSMENT: PAIN_FUNCTIONAL_ASSESSMENT: 0-10

## 2025-05-07 ASSESSMENT — PAIN SCALES - GENERAL: PAINLEVEL_OUTOF10: 0 - NO PAIN

## 2025-05-07 NOTE — PROGRESS NOTES
Orthopaedic Spine Surgery Clinic Note    Patient ID: Chris Rivas is a 55 y.o. male.    12 weeks s/p L4-5 laminectomy, left L5-S1 hemilaminectomy/foraminotomy on 2/6/25.     Mr. Rivas presents for outpatient follow-up.  He overall has been doing very well.  He reports improved low back and bilateral lower extremity radiating pains now.  He is very happy with results of his surgery and is looking forward to returning to work now.  He had been working with PT over at [facility] which has been very helpful for his recovery.  He had also been following with Dr. Stark for his left shoulder and neck pain.  He does experience neck and left upper extremity radicular pains.  He did undergo corticosteroid injection of not only his left shoulder but also his cervical HAYLEY.  The cervical HAYLEY was on 4/7/2025.  His neck and upper extremity radiculopathy pains have improved with the neck injection, however his left shoulder pain has persisted despite the shoulder injection.  He does follow with Dr. Vizcarra for his shoulder.      Vitals & Measurements  There were no vitals filed for this visit.     Ortho Exam  General: AO x 3, NAD  Cardio: examined extremities perfused by peripheral palpation  Resp: breathing unlabored  Gait: within normal limits, non-antalgic  Posterior lumbar incision well-healed.    Lower Extremity  Right  Left  IP   5/5  5/5  Quadriceps  5/5  5/5  Tibialis anterior  5/5  5/5  EHL   5/5  5/5  Gastrocnemius  5/5  5/5    Sensation: Baseline numbness in bilateral feet L>R        Diagnostic Results - Imaging    XR lumbar spine today, 5/7/2025  Official read pending.  New upright AP, lateral radiographs of the lumbar spine were obtained in the office today.  These images are of good quality.  Demonstrated on coronal view are changes consistent with an L4-5 laminectomy and left L5-S1 hemilaminectomy.  There is no interval development of spondylolisthesis or disc collapse. No appreciable pars  fracture.    Diagnosis  Encounter Diagnoses   Name Primary?    S/P laminectomy Yes    Degenerative lumbar spinal stenosis     Lumbar stenosis with neurogenic claudication     Lumbar spondylosis     Cervical radiculopathy           Assessment/Plan  Mr. Rivas is a 54-year-old male who is 12 weeks s/p L4-5 laminectomy and left L5-S1 hemilaminectomy/foraminotomy.  He is overall doing very well with improved bilateral lower extremity radicular and neurogenic pains after the surgery.  He is quite pleased with the outcome of his surgery.  He has been very diligent about doing PT since we last saw him.  This has been very helpful with regards to his recovery.  XR evaluation today in the office demonstrates no interval spondylolisthesis or appreciable fracture.  We recommended continuing activities as tolerated and allowing pain to be a guide.     He did have some additional paperwork for me to fill out for his return to work.  I have updated his return to work date to May 12, 2025.  We did specify that he should return with some light duty restrictions including no heavy lifting greater than 80 pounds and frequent breaks, approximately 1 every 2 hours.  Otherwise, he can return to activities as tolerated.  With regards to his cervical spine, he is interested in potentially having this evaluated and managed in the future.  We did have an MRI of the cervical spine from October 2024.  I did advise him that he would need to stop smoking in order to qualify for surgery for his cervical spine.  He will continue working on this.    Patient will follow-up with me in approximately 3 months for repeat clinical check and for further discussions of his cervical spine. After our discussion, the patient articulated understanding of the plan and felt that all questions had been answered satisfactorily. The patient was pleased with the visit and very appreciative for the care rendered.    **Please excuse any errors in grammar or  translation related to this dictation. Voice recognition software was utilized to prepare this document. **    F/u 3 months.      Orders Placed This Encounter    XR lumbar spine 2-3 views       --    Crow Wilkins MD  Orthopaedic Spine Surgery  , Department of Orthopaedic Surgery  [facility]

## 2025-05-08 ENCOUNTER — OFFICE VISIT (OUTPATIENT)
Dept: PAIN MEDICINE | Facility: CLINIC | Age: 55
End: 2025-05-08
Payer: COMMERCIAL

## 2025-05-08 VITALS
BODY MASS INDEX: 25.69 KG/M2 | WEIGHT: 222 LBS | OXYGEN SATURATION: 98 % | DIASTOLIC BLOOD PRESSURE: 74 MMHG | HEIGHT: 78 IN | RESPIRATION RATE: 18 BRPM | HEART RATE: 70 BPM | SYSTOLIC BLOOD PRESSURE: 118 MMHG

## 2025-05-08 DIAGNOSIS — M54.12 CERVICAL RADICULOPATHY: Primary | ICD-10-CM

## 2025-05-08 DIAGNOSIS — M25.512 CHRONIC LEFT SHOULDER PAIN: ICD-10-CM

## 2025-05-08 DIAGNOSIS — G89.29 CHRONIC LEFT SHOULDER PAIN: ICD-10-CM

## 2025-05-08 PROCEDURE — 99214 OFFICE O/P EST MOD 30 MIN: CPT | Performed by: STUDENT IN AN ORGANIZED HEALTH CARE EDUCATION/TRAINING PROGRAM

## 2025-05-08 PROCEDURE — 3008F BODY MASS INDEX DOCD: CPT | Performed by: STUDENT IN AN ORGANIZED HEALTH CARE EDUCATION/TRAINING PROGRAM

## 2025-05-08 PROCEDURE — G2211 COMPLEX E/M VISIT ADD ON: HCPCS | Performed by: STUDENT IN AN ORGANIZED HEALTH CARE EDUCATION/TRAINING PROGRAM

## 2025-05-08 ASSESSMENT — PAIN DESCRIPTION - DESCRIPTORS: DESCRIPTORS: DULL;STABBING;ACHING

## 2025-05-08 ASSESSMENT — PAIN SCALES - GENERAL
PAINLEVEL_OUTOF10: 4
PAINLEVEL_OUTOF10: 4

## 2025-05-08 ASSESSMENT — PAIN - FUNCTIONAL ASSESSMENT: PAIN_FUNCTIONAL_ASSESSMENT: 0-10

## 2025-05-12 ENCOUNTER — DOCUMENTATION (OUTPATIENT)
Dept: PHYSICAL THERAPY | Facility: CLINIC | Age: 55
End: 2025-05-12
Payer: COMMERCIAL

## 2025-05-12 NOTE — PROGRESS NOTES
Physical Therapy                 Therapy Communication Note    Patient Name: Chris Rivas  MRN: [id number]  Encounter Date: 5/12/2025    Discipline: Physical Therapy    Missed Time: No Show    Comment:

## 2025-05-14 ENCOUNTER — APPOINTMENT (OUTPATIENT)
Dept: PHYSICAL THERAPY | Facility: CLINIC | Age: 55
End: 2025-05-14
Payer: COMMERCIAL

## 2025-05-16 ENCOUNTER — TELEPHONE (OUTPATIENT)
Dept: PHYSICAL THERAPY | Facility: CLINIC | Age: 55
End: 2025-05-16
Payer: COMMERCIAL

## 2025-05-20 ENCOUNTER — APPOINTMENT (OUTPATIENT)
Dept: PHYSICAL THERAPY | Facility: CLINIC | Age: 55
End: 2025-05-20
Payer: COMMERCIAL

## 2025-05-22 ENCOUNTER — APPOINTMENT (OUTPATIENT)
Dept: PHYSICAL THERAPY | Facility: CLINIC | Age: 55
End: 2025-05-22
Payer: COMMERCIAL

## 2025-05-22 ENCOUNTER — PATIENT MESSAGE (OUTPATIENT)
Dept: PRIMARY CARE | Facility: CLINIC | Age: 55
End: 2025-05-22
Payer: COMMERCIAL

## 2025-05-22 DIAGNOSIS — Z78.9 HEPATITIS B VACCINATION STATUS UNKNOWN: Primary | ICD-10-CM

## 2025-05-22 NOTE — PATIENT COMMUNICATION
I do not see any records of a Hep B Vaccine. I Looked through Epic and also looked into [employer] and I do not see anything. PL

## 2025-05-27 ENCOUNTER — APPOINTMENT (OUTPATIENT)
Dept: PHYSICAL THERAPY | Facility: CLINIC | Age: 55
End: 2025-05-27
Payer: COMMERCIAL

## 2025-05-29 ENCOUNTER — TREATMENT (OUTPATIENT)
Dept: PHYSICAL THERAPY | Facility: CLINIC | Age: 55
End: 2025-05-29
Payer: COMMERCIAL

## 2025-05-29 DIAGNOSIS — Z98.890 S/P LAMINECTOMY: ICD-10-CM

## 2025-05-29 PROCEDURE — 97014 ELECTRIC STIMULATION THERAPY: CPT | Mod: GP

## 2025-05-29 PROCEDURE — 97140 MANUAL THERAPY 1/> REGIONS: CPT | Mod: GP

## 2025-05-29 PROCEDURE — 97110 THERAPEUTIC EXERCISES: CPT | Mod: GP

## 2025-05-29 ASSESSMENT — PAIN SCALES - GENERAL: PAINLEVEL_OUTOF10: 9

## 2025-05-29 ASSESSMENT — PAIN - FUNCTIONAL ASSESSMENT: PAIN_FUNCTIONAL_ASSESSMENT: 0-10

## 2025-05-29 NOTE — PROGRESS NOTES
Physical Therapy Treatment    Patient Name: Chris Rivas  MRN: [id number]  Encounter date: 5/29/2025    Time Calculation  Start Time: 0735  Stop Time: 0815  Time Calculation (min): 40 min  PT Modalities Time Entry  E-Stim (Unattended) Time Entry: 15  PT Therapeutic Procedures Time Entry  Manual Therapy Time Entry: 15  Therapeutic Exercise Time Entry: 10    Visit # 8 of 16  Visits/Dates Authorized: 2025: ANTH YHQ - NO AUTH / 100% COVERAGE / 30V in/out netw - 1 used / Availity [id number] / ds 4/3/25 //     Current Problem:   Problem List Items Addressed This Visit           ICD-10-CM    S/P laminectomy Z98.890     Surgery: s/p L4-5 laminectomy, left L5-S1 hemilaminectomy/foraminotomy on 2/6/25   Surgery date: 2/6/2025     Precautions:    DHIRAJ Fall Risk Score (The score of 4 or more indicates an increased risk of falling): 3  Chronic neck/back pain and radiculopathy, recent lumbar surgery, headaches, DDD       Subjective   General:    Patient has been absent from therapy since 4/30/25 due to his busy work schedule. He states his lower back has felt great in general, however his neck and both shoulders are killing him since returning to work and performing repetitive lifting and overhead movements.    Pre-Treatment Symptoms:   Pain Assessment: 0-10  0-10 (Numeric) Pain Score: 9    Objective   L>R shoulder pain. Tender to palpation and soft tissue restrictions noted in L proximal biceps tendon and anterior GH, as well as bilateral UT             Treatments:      Therapeutic Exercise 52779:   UBE L1 4' fw/bw  Supine dowel flexion x15  HEP review and instruction to limit horiz abd with band (if bothersome), emphasize proper posture throughout the day.    DNP  Supine dowel flexion 90 deg to OH - pain-free range x15  Supine orange horiz abd x12  Supine orange TB loop around wrists, OH flexion AROM x12  Tband row with scap pinch purple 2x15  Tband extension green x15  Green Ws x15  Standing green horiz abd x15  Standing  "open book 1x12 R/L   Supine cervical retraction isometric x10  3 sec hold  Supine chin nod x10  3 sec hold  HSC DL 50# 2x15  Shuttle leg press DL 75# 1x10 , SL 37.5# 1x10 R/L   Shuttle press glute max extension;  12.5#  x 15 each  Supine LTR 2 x 10  Supine PPT x 20 reps at 3-5\" holds.   Supine TrA bracing band resisted clam shell, orange. 2 x 10 reps   Supine band resisted hip abduction with supine bridge 2 x 10 reps  Standing TB hip abduction R and L , pink band, 2 x 10  Standing TBSS, orange, x 1 lap at counter top.       Manual Therapy 61363:   STM- CPS, levators, UT  Gentle manual cervical traction    Deferred due to time constraints:  Manual pec stretch in supine  IASTM L shoulder / biceps  SOR      Modalities:   Unattended e-stim: TENS: applied to R/L UT, L anterior/posterior shoulder, Intensity to tolerance, for 15 minutes, in Supine , with wedge, with moist heat at cervical    HEP / Access Codes: URL: [email or web address]  Access Code: [email or web address]  Date: 04/14/2025  - Hooklying Clamshell with Resistance  - 1-2 x daily - 6 x weekly - 2 sets - 10 reps  - Standing Hamstring Stretch on Chair  - 1-2 x daily - 6 x weekly - 2 reps - 30 hold    Access Code: [email or web address]  Date: 04/04/2025  - Supine Lower Trunk Rotation  - 1 x daily - 20 reps  - Hooklying Single Knee to Chest Stretch  - 1 x daily - 2 sets - 20 seconds hold  - Supine Bridge  - 1 x daily - 1-2 sets - 10 reps  - Hooklying Single Leg Bent Knee Fallouts with Resistance  - 1 x daily - 10-15 reps  - Supine Posterior Pelvic Tilt  - 1 x daily - 10-15 reps - 5 seconds hold  - Supine March  - 1 x daily - 10-20 reps  - Seated Lumbar Flexion Stretch  - 1 x daily - 1-2 sets - 10 reps  - Child's Pose Stretch  - 1 x daily - 1-2 sets - 10 reps  - Standing hip Abduction at Counter - Right and Left (Light hand support on counter)  - 1 x daily - 1-2 sets - 10 reps    Access Code: [email or web address]  Date: 04/16/2025  - Supine Shoulder Horizontal Abduction with Resistance  - 1 x " daily - 1-2 sets - 10 reps  - Supine Shoulder Flexion Extension Full Range AROM  - 1 x daily - 2 sets - 10 reps    Access Code: [email or web address]  Date: 04/23/2025  - Supine Cervical Retraction with Towel  - 1-2 x daily - 2 sets - 10 reps - 3 sec hold  - Supine Chin Tuck  - 1-2 x daily - 2 sets - 10 reps  - Standing Thoracic Open Book at Wall  - 1-2 x daily - 1-2 sets - 10 reps  - Standing Shoulder Row with Anchored Resistance  - 1 x daily - 2 sets - 15 reps  - Shoulder extension with resistance - Neutral  - 1 x daily - 2 sets - 10 reps    Assessment:    Held on aggressive ther ex strengthening today due to patient returning to strenuous work activity immediately following today's treatment. Forward head and bilateral shoulders, with muscle imbalances potentially contributing to CT and shoulder pain and dysfunction. Today's session focused on modalities and light cervical mobs/STM to address high pain levels.    Post-Treatment Symptoms:   Response to Interventions: Decrease in pain    Plan: Patient's POC will be transferred to [facility].      Therapy options: will be seen for skilled physical therapy services  Planned modality interventions: cryotherapy, electrical stimulation/Russian stimulation, TENS, thermotherapy (hydrocollator packs), ultrasound and traction  Planned therapy interventions: therapeutic activities, stretching, strengthening, spinal/joint mobilization, soft tissue mobilization, postural training, neuromuscular re-education, manual therapy, abdominal trunk stabilization, body mechanics training, flexibility, functional ROM exercises, home exercise program, joint mobilization and balance/weight-bearing training  Other planned therapy interventions: Dry needling  Frequency: 1-2x/week.  Duration in visits: 16      Goals:   Active       PT Problem       Lumbar       Start:  04/07/25    Expected End:  06/30/25       1) Pt will report pain levels no greater than 1/10 at worst with activity for less  difficulty standing, bending, lifting, and transferring, and working.  2) Pt will display improved pain-free lumbar spine AROM WFL in all planes for less difficulty standing, bending, lifting, transferring and performing normal work activities.  3) Pt will score <10% impairment on Low Back Disability Questionnaire to indicate minimal low back dysfunction.  4) Pt will improve bilateral hip and knee strength to at least 5/5 for all major muscle groups for improved functional strength with transferring, stairs, and general mobility.              PT Problem       Neck       Start:  04/07/25    Expected End:  06/30/25       1) Pt will report neck pain levels no greater than 2/10 at worst with activity for less difficulty turning head, looking up, driving, and working.  2) Pt will display improved pain-free cervical spine AROM WFL for less difficulty turning head, looking up, driving, and working  3) Pt will score <10% impairment on NDI to indicate minimal neck dysfunction.  4) Pt will deny any radiating pain or N&T into BUE.  5) Pt will improve postural awareness and understand proper ergonomics enabling patient to make timely self-corrections and avoid postural strain that develops into myofascial restrictions.

## 2025-05-29 NOTE — LETTER
May 29, 2025     Patient: Chris Rivas   YOB: 1970   Date of Visit: 5/29/2025       To Whom It May Concern:    Chris Rivas was seen in my clinic on 5/29/2025 at 7:30 am. Please excuse Chris for his absence from work on this day to make the appointment.    If you have any questions or concerns, please don't hesitate to call.         Sincerely,         Clyde Crenshaw, PT

## 2025-06-02 ENCOUNTER — APPOINTMENT (OUTPATIENT)
Dept: PHYSICAL THERAPY | Facility: CLINIC | Age: 55
End: 2025-06-02
Payer: COMMERCIAL

## 2025-06-20 ENCOUNTER — APPOINTMENT (OUTPATIENT)
Dept: PHYSICAL THERAPY | Facility: CLINIC | Age: 55
End: 2025-06-20
Payer: COMMERCIAL

## 2025-06-20 DIAGNOSIS — Z98.890 S/P LAMINECTOMY: ICD-10-CM

## 2025-06-20 PROCEDURE — 97140 MANUAL THERAPY 1/> REGIONS: CPT | Mod: GP | Performed by: PHYSICAL THERAPIST

## 2025-06-20 PROCEDURE — 97014 ELECTRIC STIMULATION THERAPY: CPT | Mod: GP | Performed by: PHYSICAL THERAPIST

## 2025-06-20 PROCEDURE — 97110 THERAPEUTIC EXERCISES: CPT | Mod: GP | Performed by: PHYSICAL THERAPIST

## 2025-06-20 ASSESSMENT — PAIN - FUNCTIONAL ASSESSMENT: PAIN_FUNCTIONAL_ASSESSMENT: 0-10

## 2025-06-20 ASSESSMENT — PAIN SCALES - GENERAL: PAINLEVEL_OUTOF10: 7

## 2025-06-20 NOTE — PROGRESS NOTES
"Physical Therapy Treatment    Patient Name: Chris Rivas  MRN: [id number]  Encounter date: 6/20/2025    Time Calculation  Start Time: 1330  Stop Time: 1420  Time Calculation (min): 50 min  PT Modalities Time Entry  E-Stim (Unattended) Time Entry: 15  PT Therapeutic Procedures Time Entry  Therapeutic Exercise Time Entry: 19  Manual Therapy Time Entry: 11    Visit # 9 of 16  Visits/Dates Authorized: 2025: ANTH [employer] - NO AUTH / 100% COVERAGE / 30V in/out netw - 1 used / Availity [id number] / ds 4/3/25 //     Current Problem:   Problem List Items Addressed This Visit           ICD-10-CM    S/P laminectomy Z98.890     Surgery: s/p L4-5 laminectomy, left L5-S1 hemilaminectomy/foraminotomy on 2/6/25   Surgery date: 2/6/2025     Precautions:    GALILEAADI Fall Risk Score (The score of 4 or more indicates an increased risk of falling): 3  Chronic neck/back pain and radiculopathy, recent lumbar surgery, headaches, DDD       Subjective   General:    Patient reports L shoulder and neck was feeling better after last session for 2 days but since then has had pain and stated \"it feels horrible\" has been absent from therapy since 5/29/25 due to his busy work schedule. He states his lower back has felt great in general, and is compliant with HEP. Did order a TENS unit and still waiting for it to come in.     Pre-Treatment Symptoms:   Pain Assessment: 0-10  0-10 (Numeric) Pain Score: 7    Objective   L>R shoulder pain. Tender to palpation and soft tissue restrictions noted in L proximal biceps tendon and anterior GH, as well as bilateral UT             Treatments:      Therapeutic Exercise 71969:   UBE L1 4' fw/bw  Supine dowel flexion 2x10  Supine cervical rotation with towel assist 3x10''  Seated cervical extension stretch with towel assist 3x10-15''   Seated UT stretch 3x10-15''  HEP review and instruction to limit HEP that bother him, stay in pain free range, emphasize proper posture throughout the day.    DNP  Supine dowel flexion " "90 deg to OH - pain-free range x15  Supine orange horiz abd x12  Supine orange TB loop around wrists, OH flexion AROM x12  Tband row with scap pinch purple 2x15  Tband extension green x15  Green Ws x15  Standing green horiz abd x15  Standing open book 1x12 R/L   Supine cervical retraction isometric x10  3 sec hold  Supine chin nod x10  3 sec hold  HSC DL 50# 2x15  Shuttle leg press DL 75# 1x10 , SL 37.5# 1x10 R/L   Shuttle press glute max extension;  12.5#  x 15 each  Supine LTR 2 x 10  Supine PPT x 20 reps at 3-5\" holds.   Supine TrA bracing band resisted clam shell, orange. 2 x 10 reps   Supine band resisted hip abduction with supine bridge 2 x 10 reps  Standing TB hip abduction R and L , pink band, 2 x 10  Standing TBSS, orange, x 1 lap at counter top.       Manual Therapy 78255:   STM- CPS, levators, UT  Gentle manual cervical traction    Deferred due to time constraints:  Manual pec stretch in supine  IASTM L shoulder / biceps  SOR      Modalities:   Unattended e-stim: TENS: applied to R/L UT, L anterior/posterior shoulder, Intensity to tolerance, for 15 minutes, in Supine , with wedge, with moist heat at cervical and upper  thoracic    HEP / Access Codes: URL: [email or web address]  Access Code: [email or web address]  Date: 04/14/2025  - Hooklying Clamshell with Resistance  - 1-2 x daily - 6 x weekly - 2 sets - 10 reps  - Standing Hamstring Stretch on Chair  - 1-2 x daily - 6 x weekly - 2 reps - 30 hold    Access Code: [email or web address]  Date: 04/04/2025  - Supine Lower Trunk Rotation  - 1 x daily - 20 reps  - Hooklying Single Knee to Chest Stretch  - 1 x daily - 2 sets - 20 seconds hold  - Supine Bridge  - 1 x daily - 1-2 sets - 10 reps  - Hooklying Single Leg Bent Knee Fallouts with Resistance  - 1 x daily - 10-15 reps  - Supine Posterior Pelvic Tilt  - 1 x daily - 10-15 reps - 5 seconds hold  - Supine March  - 1 x daily - 10-20 reps  - Seated Lumbar Flexion Stretch  - 1 x daily - 1-2 sets - 10 reps  - Child's Pose Stretch "  - 1 x daily - 1-2 sets - 10 reps  - Standing hip Abduction at Counter - Right and Left (Light hand support on counter)  - 1 x daily - 1-2 sets - 10 reps    Access Code: [email or web address]  Date: 04/16/2025  - Supine Shoulder Horizontal Abduction with Resistance  - 1 x daily - 1-2 sets - 10 reps  - Supine Shoulder Flexion Extension Full Range AROM  - 1 x daily - 2 sets - 10 reps    Access Code: [email or web address]  Date: 04/23/2025  - Supine Cervical Retraction with Towel  - 1-2 x daily - 2 sets - 10 reps - 3 sec hold  - Supine Chin Tuck  - 1-2 x daily - 2 sets - 10 reps  - Standing Thoracic Open Book at Wall  - 1-2 x daily - 1-2 sets - 10 reps  - Standing Shoulder Row with Anchored Resistance  - 1 x daily - 2 sets - 15 reps  - Shoulder extension with resistance - Neutral  - 1 x daily - 2 sets - 10 reps    Assessment:    Patient tolerated treatment well with reported reduction in pain to 3/10 at conclusion of treatment. Emphasis of treatment continued to be on modalities and light cervical mobs/STM to address high pain levels. Did add some cervical and UT stretches to improve flexibility. Patient instructed once receiving home TENS unit to bring in for education on set up.     Post-Treatment Symptoms:   Response to Interventions: Decrease in pain    Plan:      Therapy options: will be seen for skilled physical therapy services  Planned modality interventions: cryotherapy, electrical stimulation/Russian stimulation, TENS, thermotherapy (hydrocollator packs), ultrasound and traction  Planned therapy interventions: therapeutic activities, stretching, strengthening, spinal/joint mobilization, soft tissue mobilization, postural training, neuromuscular re-education, manual therapy, abdominal trunk stabilization, body mechanics training, flexibility, functional ROM exercises, home exercise program, joint mobilization and balance/weight-bearing training  Other planned therapy interventions: Dry needling  Frequency: 1-2x/week.  Duration in  visits: 16      Goals:   Active       PT Problem       Lumbar       Start:  04/07/25    Expected End:  06/30/25       1) Pt will report pain levels no greater than 1/10 at worst with activity for less difficulty standing, bending, lifting, and transferring, and working.  2) Pt will display improved pain-free lumbar spine AROM WFL in all planes for less difficulty standing, bending, lifting, transferring and performing normal work activities.  3) Pt will score <10% impairment on Low Back Disability Questionnaire to indicate minimal low back dysfunction.  4) Pt will improve bilateral hip and knee strength to at least 5/5 for all major muscle groups for improved functional strength with transferring, stairs, and general mobility.              PT Problem       Neck       Start:  04/07/25    Expected End:  06/30/25       1) Pt will report neck pain levels no greater than 2/10 at worst with activity for less difficulty turning head, looking up, driving, and working.  2) Pt will display improved pain-free cervical spine AROM WFL for less difficulty turning head, looking up, driving, and working  3) Pt will score <10% impairment on NDI to indicate minimal neck dysfunction.  4) Pt will deny any radiating pain or N&T into BUE.  5) Pt will improve postural awareness and understand proper ergonomics enabling patient to make timely self-corrections and avoid postural strain that develops into myofascial restrictions.

## 2025-06-30 ENCOUNTER — APPOINTMENT (OUTPATIENT)
Facility: CLINIC | Age: 55
End: 2025-06-30
Payer: COMMERCIAL

## 2025-07-17 ENCOUNTER — DOCUMENTATION (OUTPATIENT)
Facility: CLINIC | Age: 55
End: 2025-07-17
Payer: COMMERCIAL

## 2025-07-17 NOTE — PROGRESS NOTES
Physical Therapy                 Therapy Communication Note    Patient Name: Chris Rivas  MRN: [id number]  Encounter Date: 7/17/2025    Discipline: Physical Therapy    Missed Time: No Show    Comment:

## 2025-08-25 DIAGNOSIS — M48.061 DEGENERATIVE LUMBAR SPINAL STENOSIS: ICD-10-CM

## 2025-08-26 ENCOUNTER — TELEPHONE (OUTPATIENT)
Dept: PRIMARY CARE | Facility: CLINIC | Age: 55
End: 2025-08-26
Payer: COMMERCIAL

## 2025-08-26 DIAGNOSIS — M48.061 DEGENERATIVE LUMBAR SPINAL STENOSIS: ICD-10-CM

## 2025-08-26 RX ORDER — GABAPENTIN 600 MG/1
600 TABLET ORAL 3 TIMES DAILY
Qty: 90 TABLET | Refills: 2 | Status: SHIPPED | OUTPATIENT
Start: 2025-08-26

## 2025-08-27 RX ORDER — GABAPENTIN 600 MG/1
600 TABLET ORAL 3 TIMES DAILY
Qty: 90 TABLET | Refills: 0 | OUTPATIENT
Start: 2025-08-27

## (undated) DEVICE — GLOVE, SURGICAL, PROTEXIS PI BLUE W/NEUTHERA, 8.0, PF, LF

## (undated) DEVICE — ELECTRODE, ELECTROSURGICAL, BLADE, INSULATED, ENT/IMA, STERILE

## (undated) DEVICE — RESERVOIR, WOUND, W/TROCAR, PVC, MEDIUM, 400CC, DAVOL, 1/8 IN, 10FR

## (undated) DEVICE — NEEDLE, SPINAL, 20 G X 3.5 IN, YELLOW HUB

## (undated) DEVICE — WOUND SYSTEM, DEBRIDEMENT & CLEANING, O.R DUOPAK

## (undated) DEVICE — GLOVE, SURGICAL, PROTEXIS PI , 7.5, PF, LF

## (undated) DEVICE — DRESSING, ANTIMICROBIAL, W/4 MM CENTER HOLE, W/CHLORHEXIDINE GLUCONATE, BIOPATCH, 1 IN DISC

## (undated) DEVICE — DRAPE, SHEET, THREE QUARTER, FAN FOLD, 57 X 77 IN

## (undated) DEVICE — Device

## (undated) DEVICE — SUTURE, MONOCRYL, 3-0, 18 IN, PS2, UNDYED

## (undated) DEVICE — KIT, PATIENT CARE, JACKSON TABLE W/PRONE-SAFE HEADREST

## (undated) DEVICE — HEMOSTATIC, MATRIX, SURGIFLO, 8ML

## (undated) DEVICE — 16FR ADVANCE FOLEY TRAY, LUBRI-SIL, DRAINAGE BAG, ANTI-REFLUX CHAMBER, CONTROL-FIT OUTLET TUBE, STATLOCK STABILIZATION DEVICE

## (undated) DEVICE — SUTURE, VICRYL, 0 X 27 IN, CT-1, UNDYED BR

## (undated) DEVICE — CLOSURE SYSTEM, DERMABOND, PRINEO, 22CM, STERILE

## (undated) DEVICE — TIP, SUCTION, FRAZIER, W/CONTROL VENT, 12 FR